# Patient Record
Sex: MALE | Race: AMERICAN INDIAN OR ALASKA NATIVE | Employment: FULL TIME
[De-identification: names, ages, dates, MRNs, and addresses within clinical notes are randomized per-mention and may not be internally consistent; named-entity substitution may affect disease eponyms.]

---

## 2017-05-18 ENCOUNTER — TELEPHONE (OUTPATIENT)
Dept: CASE MANAGEMENT | Age: 52
End: 2017-05-18

## 2017-05-22 ENCOUNTER — TELEPHONE (OUTPATIENT)
Dept: CASE MANAGEMENT | Age: 52
End: 2017-05-22

## 2017-08-22 ENCOUNTER — TELEPHONE (OUTPATIENT)
Dept: CASE MANAGEMENT | Age: 52
End: 2017-08-22

## 2017-11-17 ENCOUNTER — TELEPHONE (OUTPATIENT)
Dept: CASE MANAGEMENT | Age: 52
End: 2017-11-17

## 2018-04-24 NOTE — TELEPHONE ENCOUNTER
Incidental Pulmonary Nodule - Follow up Note:     CT Abdomen pelvis 11/17/16:  Bilateral pulmonary nodules, largest of which measures   approximately 0.56 cm, CT surveillance within 6 months is recommended. Further followup guidelines provided below.           Notification letter faxed to Cayetano Ryan MD 12/22/2016 9:42 AM. Receipt verified.   Stacey Avelar     Final courtesy reminder faxed/routed to Cayetano Ryan MD. Patients with incidental pulmonary nodules measuring under 8 mm are not being followed by the incidental pulmonary nodule program at this time.          5/22/2017 -attempted to call Gaye Cavazos and obtain primary care physician information.  Dr. Lucia Ford called into the program stating he is not Bernabe's physician and asked that we update Epic.  PCP letter will be mailed to the patient requesting he call in and update us.     8/22/2017 - attempted to do follow up call. No answer and no voicemail.     11/17/2017 - multiple unsuccessful attempts at contacting patient. Final attempt letter mailed to home. Patient removed from incidental pulmonary nodule program surveillance.       Daniella Gee., R.T.(R)(T), Radiology Patient Navigator
negative...

## 2018-10-22 ENCOUNTER — APPOINTMENT (OUTPATIENT)
Dept: ULTRASOUND IMAGING | Age: 53
End: 2018-10-22

## 2018-10-22 ENCOUNTER — APPOINTMENT (OUTPATIENT)
Dept: CT IMAGING | Age: 53
End: 2018-10-22

## 2018-10-22 ENCOUNTER — HOSPITAL ENCOUNTER (EMERGENCY)
Age: 53
Discharge: HOME | End: 2018-10-22
Attending: EMERGENCY MEDICINE

## 2018-10-22 VITALS
SYSTOLIC BLOOD PRESSURE: 149 MMHG | HEART RATE: 87 BPM | RESPIRATION RATE: 18 BRPM | OXYGEN SATURATION: 98 % | DIASTOLIC BLOOD PRESSURE: 101 MMHG | TEMPERATURE: 96.7 F

## 2018-10-22 DIAGNOSIS — R11.10 VOMITING AND DIARRHEA: ICD-10-CM

## 2018-10-22 DIAGNOSIS — K80.20 CALCULUS OF GALLBLADDER WITHOUT CHOLECYSTITIS WITHOUT OBSTRUCTION: ICD-10-CM

## 2018-10-22 DIAGNOSIS — R10.9 ABDOMINAL PAIN, UNSPECIFIED ABDOMINAL LOCATION: Primary | ICD-10-CM

## 2018-10-22 DIAGNOSIS — R19.7 VOMITING AND DIARRHEA: ICD-10-CM

## 2018-10-22 LAB
ALBUMIN SERPL-MCNC: 4.5 G/DL (ref 3.5–5.2)
ALP BLD-CCNC: 92 U/L (ref 40–129)
ALT SERPL-CCNC: 18 U/L (ref 0–40)
ANION GAP SERPL CALCULATED.3IONS-SCNC: 14 MMOL/L (ref 7–16)
AST SERPL-CCNC: 19 U/L (ref 0–39)
BACTERIA: ABNORMAL /HPF
BASOPHILS ABSOLUTE: 0.02 E9/L (ref 0–0.2)
BASOPHILS RELATIVE PERCENT: 0.4 % (ref 0–2)
BILIRUB SERPL-MCNC: 0.3 MG/DL (ref 0–1.2)
BILIRUBIN URINE: NEGATIVE
BLOOD, URINE: NEGATIVE
BUN BLDV-MCNC: 20 MG/DL (ref 6–20)
CALCIUM SERPL-MCNC: 9.1 MG/DL (ref 8.6–10.2)
CHLORIDE BLD-SCNC: 103 MMOL/L (ref 98–107)
CLARITY: CLEAR
CO2: 25 MMOL/L (ref 22–29)
COLOR: YELLOW
CREAT SERPL-MCNC: 1 MG/DL (ref 0.7–1.2)
EKG ATRIAL RATE: 75 BPM
EKG P AXIS: 39 DEGREES
EKG P-R INTERVAL: 140 MS
EKG Q-T INTERVAL: 386 MS
EKG QRS DURATION: 98 MS
EKG QTC CALCULATION (BAZETT): 431 MS
EKG R AXIS: 2 DEGREES
EKG T AXIS: 8 DEGREES
EKG VENTRICULAR RATE: 75 BPM
EOSINOPHILS ABSOLUTE: 0.14 E9/L (ref 0.05–0.5)
EOSINOPHILS RELATIVE PERCENT: 3 % (ref 0–6)
GFR AFRICAN AMERICAN: >60
GFR NON-AFRICAN AMERICAN: >60 ML/MIN/1.73
GLUCOSE BLD-MCNC: 104 MG/DL (ref 74–109)
GLUCOSE URINE: NEGATIVE MG/DL
HCT VFR BLD CALC: 46.1 % (ref 37–54)
HEMOGLOBIN: 14.9 G/DL (ref 12.5–16.5)
IMMATURE GRANULOCYTES #: 0.01 E9/L
IMMATURE GRANULOCYTES %: 0.2 % (ref 0–5)
KETONES, URINE: NEGATIVE MG/DL
LACTIC ACID: 1.6 MMOL/L (ref 0.5–2.2)
LEUKOCYTE ESTERASE, URINE: NEGATIVE
LIPASE: 29 U/L (ref 13–60)
LYMPHOCYTES ABSOLUTE: 1.38 E9/L (ref 1.5–4)
LYMPHOCYTES RELATIVE PERCENT: 29.6 % (ref 20–42)
MCH RBC QN AUTO: 28.8 PG (ref 26–35)
MCHC RBC AUTO-ENTMCNC: 32.3 % (ref 32–34.5)
MCV RBC AUTO: 89 FL (ref 80–99.9)
MONOCYTES ABSOLUTE: 0.59 E9/L (ref 0.1–0.95)
MONOCYTES RELATIVE PERCENT: 12.6 % (ref 2–12)
MUCUS: PRESENT
NEUTROPHILS ABSOLUTE: 2.53 E9/L (ref 1.8–7.3)
NEUTROPHILS RELATIVE PERCENT: 54.2 % (ref 43–80)
NITRITE, URINE: NEGATIVE
PDW BLD-RTO: 13 FL (ref 11.5–15)
PH UA: 6 (ref 5–9)
PLATELET # BLD: 301 E9/L (ref 130–450)
PMV BLD AUTO: 10.5 FL (ref 7–12)
POTASSIUM SERPL-SCNC: 3.5 MMOL/L (ref 3.5–5)
PROTEIN UA: NORMAL MG/DL
RBC # BLD: 5.18 E12/L (ref 3.8–5.8)
RBC UA: ABNORMAL /HPF (ref 0–2)
SODIUM BLD-SCNC: 142 MMOL/L (ref 132–146)
SPECIFIC GRAVITY UA: >=1.03 (ref 1–1.03)
TOTAL PROTEIN: 7.9 G/DL (ref 6.4–8.3)
TROPONIN: <0.01 NG/ML (ref 0–0.03)
UROBILINOGEN, URINE: 0.2 E.U./DL
WBC # BLD: 4.7 E9/L (ref 4.5–11.5)
WBC UA: ABNORMAL /HPF (ref 0–5)

## 2018-10-22 PROCEDURE — 81001 URINALYSIS AUTO W/SCOPE: CPT

## 2018-10-22 PROCEDURE — 6360000004 HC RX CONTRAST MEDICATION: Performed by: RADIOLOGY

## 2018-10-22 PROCEDURE — 6360000002 HC RX W HCPCS: Performed by: NURSE PRACTITIONER

## 2018-10-22 PROCEDURE — 80053 COMPREHEN METABOLIC PANEL: CPT

## 2018-10-22 PROCEDURE — 93005 ELECTROCARDIOGRAM TRACING: CPT | Performed by: NURSE PRACTITIONER

## 2018-10-22 PROCEDURE — 2580000003 HC RX 258: Performed by: EMERGENCY MEDICINE

## 2018-10-22 PROCEDURE — C9113 INJ PANTOPRAZOLE SODIUM, VIA: HCPCS | Performed by: EMERGENCY MEDICINE

## 2018-10-22 PROCEDURE — 74177 CT ABD & PELVIS W/CONTRAST: CPT

## 2018-10-22 PROCEDURE — 83605 ASSAY OF LACTIC ACID: CPT

## 2018-10-22 PROCEDURE — 76705 ECHO EXAM OF ABDOMEN: CPT

## 2018-10-22 PROCEDURE — 6360000002 HC RX W HCPCS: Performed by: EMERGENCY MEDICINE

## 2018-10-22 PROCEDURE — 96375 TX/PRO/DX INJ NEW DRUG ADDON: CPT

## 2018-10-22 PROCEDURE — 36415 COLL VENOUS BLD VENIPUNCTURE: CPT

## 2018-10-22 PROCEDURE — 99284 EMERGENCY DEPT VISIT MOD MDM: CPT

## 2018-10-22 PROCEDURE — 83690 ASSAY OF LIPASE: CPT

## 2018-10-22 PROCEDURE — 85025 COMPLETE CBC W/AUTO DIFF WBC: CPT

## 2018-10-22 PROCEDURE — 96374 THER/PROPH/DIAG INJ IV PUSH: CPT

## 2018-10-22 PROCEDURE — 84484 ASSAY OF TROPONIN QUANT: CPT

## 2018-10-22 PROCEDURE — 96372 THER/PROPH/DIAG INJ SC/IM: CPT

## 2018-10-22 RX ORDER — ONDANSETRON 4 MG/1
4 TABLET, ORALLY DISINTEGRATING ORAL ONCE
Status: COMPLETED | OUTPATIENT
Start: 2018-10-22 | End: 2018-10-22

## 2018-10-22 RX ORDER — DICYCLOMINE HYDROCHLORIDE 10 MG/1
10 CAPSULE ORAL 4 TIMES DAILY
Qty: 28 CAPSULE | Refills: 0 | Status: SHIPPED | OUTPATIENT
Start: 2018-10-22 | End: 2019-01-15

## 2018-10-22 RX ORDER — PANTOPRAZOLE SODIUM 40 MG/1
40 TABLET, DELAYED RELEASE ORAL DAILY
Qty: 30 TABLET | Refills: 3 | Status: SHIPPED | OUTPATIENT
Start: 2018-10-22 | End: 2019-02-11 | Stop reason: ALTCHOICE

## 2018-10-22 RX ORDER — PANTOPRAZOLE SODIUM 40 MG/10ML
40 INJECTION, POWDER, LYOPHILIZED, FOR SOLUTION INTRAVENOUS ONCE
Status: COMPLETED | OUTPATIENT
Start: 2018-10-22 | End: 2018-10-22

## 2018-10-22 RX ORDER — 0.9 % SODIUM CHLORIDE 0.9 %
1000 INTRAVENOUS SOLUTION INTRAVENOUS ONCE
Status: COMPLETED | OUTPATIENT
Start: 2018-10-22 | End: 2018-10-22

## 2018-10-22 RX ORDER — ONDANSETRON 4 MG/1
4 TABLET, ORALLY DISINTEGRATING ORAL EVERY 8 HOURS PRN
Qty: 9 TABLET | Refills: 0 | Status: SHIPPED | OUTPATIENT
Start: 2018-10-22 | End: 2018-10-25

## 2018-10-22 RX ORDER — DICYCLOMINE HYDROCHLORIDE 10 MG/ML
20 INJECTION INTRAMUSCULAR ONCE
Status: COMPLETED | OUTPATIENT
Start: 2018-10-22 | End: 2018-10-22

## 2018-10-22 RX ORDER — MORPHINE SULFATE 2 MG/ML
2 INJECTION, SOLUTION INTRAMUSCULAR; INTRAVENOUS ONCE
Status: COMPLETED | OUTPATIENT
Start: 2018-10-22 | End: 2018-10-22

## 2018-10-22 RX ORDER — ONDANSETRON 2 MG/ML
4 INJECTION INTRAMUSCULAR; INTRAVENOUS ONCE
Status: COMPLETED | OUTPATIENT
Start: 2018-10-22 | End: 2018-10-22

## 2018-10-22 RX ORDER — MIDAZOLAM HYDROCHLORIDE 1 MG/ML
1 INJECTION INTRAMUSCULAR; INTRAVENOUS ONCE
Status: COMPLETED | OUTPATIENT
Start: 2018-10-22 | End: 2018-10-22

## 2018-10-22 RX ADMIN — SODIUM CHLORIDE 1000 ML: 9 INJECTION, SOLUTION INTRAVENOUS at 21:19

## 2018-10-22 RX ADMIN — ONDANSETRON 4 MG: 4 TABLET, ORALLY DISINTEGRATING ORAL at 18:11

## 2018-10-22 RX ADMIN — DICYCLOMINE HYDROCHLORIDE 20 MG: 20 INJECTION, SOLUTION INTRAMUSCULAR at 21:12

## 2018-10-22 RX ADMIN — IOPAMIDOL 110 ML: 755 INJECTION, SOLUTION INTRAVENOUS at 20:22

## 2018-10-22 RX ADMIN — ONDANSETRON 4 MG: 2 INJECTION INTRAMUSCULAR; INTRAVENOUS at 23:12

## 2018-10-22 RX ADMIN — MORPHINE SULFATE 2 MG: 2 INJECTION, SOLUTION INTRAMUSCULAR; INTRAVENOUS at 23:12

## 2018-10-22 RX ADMIN — MIDAZOLAM HYDROCHLORIDE 1 MG: 2 INJECTION, SOLUTION INTRAMUSCULAR; INTRAVENOUS at 21:12

## 2018-10-22 RX ADMIN — PANTOPRAZOLE SODIUM 40 MG: 40 INJECTION, POWDER, FOR SOLUTION INTRAVENOUS at 21:12

## 2018-10-22 ASSESSMENT — PAIN DESCRIPTION - ONSET: ONSET: ON-GOING

## 2018-10-22 ASSESSMENT — PAIN DESCRIPTION - ORIENTATION: ORIENTATION: RIGHT;UPPER

## 2018-10-22 ASSESSMENT — PAIN SCALES - GENERAL
PAINLEVEL_OUTOF10: 5
PAINLEVEL_OUTOF10: 5

## 2018-10-22 ASSESSMENT — PAIN DESCRIPTION - DESCRIPTORS: DESCRIPTORS: SHARP

## 2018-10-22 ASSESSMENT — PAIN DESCRIPTION - FREQUENCY: FREQUENCY: INTERMITTENT

## 2018-10-22 ASSESSMENT — PAIN DESCRIPTION - LOCATION: LOCATION: ABDOMEN

## 2018-10-23 ENCOUNTER — TELEPHONE (OUTPATIENT)
Dept: SURGERY | Age: 53
End: 2018-10-23

## 2018-10-23 NOTE — TELEPHONE ENCOUNTER
THEODORE Bowser received a call from patient emergency contact Pepper Dorman wanting to schedule an appointment. [facility] verified & appointment letter sent.         Electronically signed by Jaime Lucia MA on 10/23/18 at 3:14 PM

## 2018-10-23 NOTE — ED PROVIDER NOTES
encounter of 10/22/18   CBC Auto Differential   Result Value Ref Range    WBC 4.7 4.5 - 11.5 E9/L    RBC 5.18 3.80 - 5.80 E12/L    Hemoglobin 14.9 12.5 - 16.5 g/dL    Hematocrit 46.1 37.0 - 54.0 %    MCV 89.0 80.0 - 99.9 fL    MCH 28.8 26.0 - 35.0 pg    MCHC 32.3 32.0 - 34.5 %    RDW 13.0 11.5 - 15.0 fL    Platelets 921 946 - 530 E9/L    MPV 10.5 7.0 - 12.0 fL    Neutrophils % 54.2 43.0 - 80.0 %    Immature Granulocytes % 0.2 0.0 - 5.0 %    Lymphocytes % 29.6 20.0 - 42.0 %    Monocytes % 12.6 (H) 2.0 - 12.0 %    Eosinophils % 3.0 0.0 - 6.0 %    Basophils % 0.4 0.0 - 2.0 %    Neutrophils # 2.53 1.80 - 7.30 E9/L    Immature Granulocytes # 0.01 E9/L    Lymphocytes # 1.38 (L) 1.50 - 4.00 E9/L    Monocytes # 0.59 0.10 - 0.95 E9/L    Eosinophils # 0.14 0.05 - 0.50 E9/L    Basophils # 0.02 0.00 - 0.20 E9/L   Comprehensive Metabolic Panel   Result Value Ref Range    Sodium 142 132 - 146 mmol/L    Potassium 3.5 3.5 - 5.0 mmol/L    Chloride 103 98 - 107 mmol/L    CO2 25 22 - 29 mmol/L    Anion Gap 14 7 - 16 mmol/L    Glucose 104 74 - 109 mg/dL    BUN 20 6 - 20 mg/dL    CREATININE 1.0 0.7 - 1.2 mg/dL    GFR Non-African American >60 >=60 mL/min/1.73    GFR African American >60     Calcium 9.1 8.6 - 10.2 mg/dL    Total Protein 7.9 6.4 - 8.3 g/dL    Alb 4.5 3.5 - 5.2 g/dL    Total Bilirubin 0.3 0.0 - 1.2 mg/dL    Alkaline Phosphatase 92 40 - 129 U/L    ALT 18 0 - 40 U/L    AST 19 0 - 39 U/L   Lipase   Result Value Ref Range    Lipase 29 13 - 60 U/L   Lactic Acid, Plasma   Result Value Ref Range    Lactic Acid 1.6 0.5 - 2.2 mmol/L   Urinalysis   Result Value Ref Range    Color, UA Yellow Straw/Yellow    Clarity, UA Clear Clear    Glucose, Ur Negative Negative mg/dL    Bilirubin Urine Negative Negative    Ketones, Urine Negative Negative mg/dL    Specific Gravity, UA >=1.030 1.005 - 1.030    Blood, Urine Negative Negative    pH, UA 6.0 5.0 - 9.0    Protein, UA TRACE Negative mg/dL    Urobilinogen, Urine 0.2 <2.0 E.U./dL    Nitrite,

## 2018-10-24 ENCOUNTER — HOSPITAL ENCOUNTER (OUTPATIENT)
Dept: CT IMAGING | Age: 53
Discharge: HOME | End: 2018-10-26

## 2018-10-24 ENCOUNTER — OFFICE VISIT (OUTPATIENT)
Dept: INTERNAL MEDICINE | Age: 53
End: 2018-10-24

## 2018-10-24 ENCOUNTER — HOSPITAL ENCOUNTER (OUTPATIENT)
Age: 53
Discharge: HOME | End: 2018-10-24

## 2018-10-24 VITALS
TEMPERATURE: 98.1 F | DIASTOLIC BLOOD PRESSURE: 73 MMHG | SYSTOLIC BLOOD PRESSURE: 133 MMHG | WEIGHT: 198 LBS | RESPIRATION RATE: 14 BRPM | HEIGHT: 74 IN | BODY MASS INDEX: 25.41 KG/M2 | HEART RATE: 63 BPM

## 2018-10-24 DIAGNOSIS — Z00.00 HEALTHCARE MAINTENANCE: ICD-10-CM

## 2018-10-24 DIAGNOSIS — R91.8 PULMONARY NODULES: ICD-10-CM

## 2018-10-24 DIAGNOSIS — Z23 ENCOUNTER FOR VACCINATION: ICD-10-CM

## 2018-10-24 DIAGNOSIS — K52.9 ENTEROCOLITIS: ICD-10-CM

## 2018-10-24 DIAGNOSIS — R91.8 PULMONARY NODULES: Primary | ICD-10-CM

## 2018-10-24 DIAGNOSIS — K80.20 CALCULUS OF GALLBLADDER WITHOUT CHOLECYSTITIS WITHOUT OBSTRUCTION: ICD-10-CM

## 2018-10-24 LAB
CHOLESTEROL, TOTAL: 173 MG/DL (ref 0–199)
HDLC SERPL-MCNC: 30 MG/DL
LDL CHOLESTEROL CALCULATED: 120 MG/DL (ref 0–99)
TRIGL SERPL-MCNC: 117 MG/DL (ref 0–149)
VLDLC SERPL CALC-MCNC: 23 MG/DL

## 2018-10-24 PROCEDURE — 99203 OFFICE O/P NEW LOW 30 MIN: CPT | Performed by: INTERNAL MEDICINE

## 2018-10-24 PROCEDURE — 90686 IIV4 VACC NO PRSV 0.5 ML IM: CPT

## 2018-10-24 PROCEDURE — 6360000002 HC RX W HCPCS

## 2018-10-24 PROCEDURE — 86703 HIV-1/HIV-2 1 RESULT ANTBDY: CPT

## 2018-10-24 PROCEDURE — 86803 HEPATITIS C AB TEST: CPT

## 2018-10-24 PROCEDURE — 71250 CT THORAX DX C-: CPT

## 2018-10-24 PROCEDURE — 80061 LIPID PANEL: CPT

## 2018-10-24 PROCEDURE — 36415 COLL VENOUS BLD VENIPUNCTURE: CPT

## 2018-10-24 PROCEDURE — G0008 ADMIN INFLUENZA VIRUS VAC: HCPCS

## 2018-10-24 ASSESSMENT — ENCOUNTER SYMPTOMS
BACK PAIN: 0
SHORTNESS OF BREATH: 0
ABDOMINAL PAIN: 1
BLOOD IN STOOL: 0
BLURRED VISION: 0
NAUSEA: 1
VOMITING: 1
WHEEZING: 0
EYE DISCHARGE: 0
DIARRHEA: 1
SPUTUM PRODUCTION: 0
HEARTBURN: 0
CONSTIPATION: 0
COUGH: 0

## 2018-10-24 NOTE — PROGRESS NOTES
hematuria, trauma, neck or back pain or other complaints. \"     REVIEW OF SYSTEMS:    Review of Systems   Constitutional: Negative for chills and fever. HENT: Negative for hearing loss. Eyes: Negative for blurred vision and discharge. Respiratory: Negative for cough, sputum production, shortness of breath and wheezing. Cardiovascular: Negative for chest pain, palpitations and leg swelling. Gastrointestinal: Positive for abdominal pain, diarrhea, nausea and vomiting. Negative for blood in stool, constipation and heartburn. Genitourinary: Negative for dysuria and urgency. Musculoskeletal: Negative for back pain, falls and myalgias. Skin: Negative. Neurological: Negative for dizziness, tremors and seizures. Endo/Heme/Allergies: Does not bruise/bleed easily. Psychiatric/Behavioral: Negative. Current Condition: stable  Current medications:  Current Outpatient Rx   Medication Sig Dispense Refill    dicyclomine (BENTYL) 10 MG capsule Take 1 capsule by mouth 4 times daily for 7 days 28 capsule 0    POTASSIUM CHLORIDE by Does not apply route.  pantoprazole (PROTONIX) 40 MG tablet Take 1 tablet by mouth daily 30 tablet 3    ondansetron (ZOFRAN ODT) 4 MG disintegrating tablet Take 1 tablet by mouth every 8 hours as needed for Nausea or Vomiting 9 tablet 0    omeprazole (PRILOSEC) 20 MG delayed release capsule Take 1 capsule by mouth 2 times daily for 14 days 28 capsule 0     compliant    Vitals, medications, current problem list, past medical and surgical history reviewed and updated after discussing with the patient.     As per HPI  Other problems being managed in the clinic include:  Patient Active Problem List   Diagnosis    Early Small bowel obstruction due to adhesions    Left flank pain       Physical Exam: (Pertinent)  /73   Pulse 63   Temp 98.1 °F (36.7 °C) (Oral)   Resp 14   Ht 6' 2\" (1.88 m)   Wt 198 lb (89.8 kg)   BMI 25.42 kg/m²     General Appearance:  awake,

## 2018-10-24 NOTE — PATIENT INSTRUCTIONS
(such as switches that go on or off when you clap your hands) to make it easier to turn lights on if you have to get up during the night. · Install sturdy handrails on stairways. · Move items in your cabinets so that the things you use a lot are on the lower shelves (about waist level). · Keep a cordless phone and a flashlight with new batteries by your bed. If possible, put a phone in each of the main rooms of your house, or carry a cell phone in case you fall and cannot reach a phone. Or, you can wear a device around your neck or wrist. You push a button that sends a signal for help. · Wear low-heeled shoes that fit well and give your feet good support. Use footwear with nonskid soles. Check the heels and soles of your shoes for wear. Repair or replace worn heels or soles. · Do not wear socks without shoes on wood floors. · Walk on the grass when the sidewalks are slippery. If you live in an area that gets snow and ice in the winter, sprinkle salt on slippery steps and sidewalks. Preventing falls in the bath  · Install grab bars and nonskid mats inside and outside your shower or tub and near the toilet and sinks. · Use shower chairs and bath benches. · Use a hand-held shower head that will allow you to sit while showering. · Get into a tub or shower by putting the weaker leg in first. Get out of a tub or shower with your strong side first.  · Repair loose toilet seats and consider installing a raised toilet seat to make getting on and off the toilet easier. · Keep your bathroom door unlocked while you are in the shower. Where can you learn more? Go to https://[employer]kate[employer] Mediaeb.GeoTrac. org and sign in to your [employer] account. Enter [phone] [id number] [phone] in the [employer] box to learn more about \"Preventing Falls: Care Instructions. \"     If you do not have an account, please click on the \"Sign Up Now\" link. Current as of: May 12, 2017  Content Version: 11.7  © 3386-0406 [employer], [facility].  Care

## 2018-10-25 LAB
HEPATITIS C ANTIBODY INTERPRETATION: NORMAL
HIV-1 AND HIV-2 ANTIBODIES: NORMAL

## 2018-11-14 ENCOUNTER — OFFICE VISIT (OUTPATIENT)
Dept: SURGERY | Age: 53
End: 2018-11-14

## 2018-11-14 VITALS
TEMPERATURE: 98.2 F | HEIGHT: 74 IN | DIASTOLIC BLOOD PRESSURE: 76 MMHG | BODY MASS INDEX: 25.73 KG/M2 | OXYGEN SATURATION: 96 % | HEART RATE: 60 BPM | RESPIRATION RATE: 16 BRPM | SYSTOLIC BLOOD PRESSURE: 122 MMHG | WEIGHT: 200.5 LBS

## 2018-11-14 DIAGNOSIS — R10.9 LEFT FLANK PAIN: ICD-10-CM

## 2018-11-14 PROCEDURE — 99202 OFFICE O/P NEW SF 15 MIN: CPT | Performed by: SURGERY

## 2018-11-14 NOTE — PROGRESS NOTES
Subjective:      Patient ID: Thu Bryson is a 52 y.o. male. HPI  RUQ pain radiating to right flank and shoulder. Known cholelithiasis. Review of Systems    Objective:   Physical Exam  No significant RUQ tenderness. Assessment:      Cholelithiasis, possible cholecystitis      Plan:      Possible lap jannie after Holidays. Re-evaluate symptoms.         Nestor Elena MD

## 2018-11-14 NOTE — PATIENT INSTRUCTIONS
MA will call after the Holidays to arrange gallbladder surgery. Any questions or concerns, please contact my medical assistant Vimal Fontaine at [phone].

## 2019-01-07 ENCOUNTER — TELEPHONE (OUTPATIENT)
Dept: SURGERY | Age: 54
End: 2019-01-07

## 2019-01-08 ENCOUNTER — TELEPHONE (OUTPATIENT)
Dept: SURGERY | Age: 54
End: 2019-01-08

## 2019-01-23 ENCOUNTER — OFFICE VISIT (OUTPATIENT)
Dept: SURGERY | Age: 54
End: 2019-01-23

## 2019-01-23 VITALS
TEMPERATURE: 97.5 F | SYSTOLIC BLOOD PRESSURE: 111 MMHG | HEIGHT: 74 IN | DIASTOLIC BLOOD PRESSURE: 75 MMHG | RESPIRATION RATE: 16 BRPM | BODY MASS INDEX: 26.18 KG/M2 | OXYGEN SATURATION: 94 % | HEART RATE: 59 BPM | WEIGHT: 204 LBS

## 2019-01-23 DIAGNOSIS — K81.1 CHRONIC CHOLECYSTITIS: Primary | ICD-10-CM

## 2019-01-23 PROCEDURE — 99212 OFFICE O/P EST SF 10 MIN: CPT | Performed by: SURGERY

## 2019-01-25 ENCOUNTER — ANESTHESIA EVENT (OUTPATIENT)
Dept: OPERATING ROOM | Age: 54
End: 2019-01-25

## 2019-01-25 ENCOUNTER — HOSPITAL ENCOUNTER (OUTPATIENT)
Age: 54
Setting detail: OBSERVATION
Discharge: HOME | End: 2019-01-26
Attending: SURGERY | Admitting: SURGERY

## 2019-01-25 ENCOUNTER — ANESTHESIA (OUTPATIENT)
Dept: OPERATING ROOM | Age: 54
End: 2019-01-25

## 2019-01-25 VITALS
TEMPERATURE: 95.9 F | SYSTOLIC BLOOD PRESSURE: 128 MMHG | RESPIRATION RATE: 27 BRPM | DIASTOLIC BLOOD PRESSURE: 85 MMHG | OXYGEN SATURATION: 99 %

## 2019-01-25 DIAGNOSIS — K81.1 CHRONIC CHOLECYSTITIS: Primary | ICD-10-CM

## 2019-01-25 PROBLEM — G89.18 POST-OPERATIVE PAIN: Status: ACTIVE | Noted: 2019-01-25

## 2019-01-25 PROCEDURE — 7100000011 HC PHASE II RECOVERY - ADDTL 15 MIN: Performed by: SURGERY

## 2019-01-25 PROCEDURE — 2720000010 HC SURG SUPPLY STERILE: Performed by: SURGERY

## 2019-01-25 PROCEDURE — 2500000003 HC RX 250 WO HCPCS

## 2019-01-25 PROCEDURE — 6360000002 HC RX W HCPCS

## 2019-01-25 PROCEDURE — 88304 TISSUE EXAM BY PATHOLOGIST: CPT

## 2019-01-25 PROCEDURE — G0378 HOSPITAL OBSERVATION PER HR: HCPCS

## 2019-01-25 PROCEDURE — 7100000001 HC PACU RECOVERY - ADDTL 15 MIN: Performed by: SURGERY

## 2019-01-25 PROCEDURE — 6370000000 HC RX 637 (ALT 250 FOR IP): Performed by: ANESTHESIOLOGY

## 2019-01-25 PROCEDURE — 6360000002 HC RX W HCPCS: Performed by: SURGERY

## 2019-01-25 PROCEDURE — 6360000002 HC RX W HCPCS: Performed by: ANESTHESIOLOGY

## 2019-01-25 PROCEDURE — 3600000014 HC SURGERY LEVEL 4 ADDTL 15MIN: Performed by: SURGERY

## 2019-01-25 PROCEDURE — 47562 LAPAROSCOPIC CHOLECYSTECTOMY: CPT | Performed by: SURGERY

## 2019-01-25 PROCEDURE — 7100000000 HC PACU RECOVERY - FIRST 15 MIN: Performed by: SURGERY

## 2019-01-25 PROCEDURE — 2709999900 HC NON-CHARGEABLE SUPPLY: Performed by: SURGERY

## 2019-01-25 PROCEDURE — 3700000001 HC ADD 15 MINUTES (ANESTHESIA): Performed by: SURGERY

## 2019-01-25 PROCEDURE — 7100000010 HC PHASE II RECOVERY - FIRST 15 MIN: Performed by: SURGERY

## 2019-01-25 PROCEDURE — 2580000003 HC RX 258: Performed by: SURGERY

## 2019-01-25 PROCEDURE — 2500000003 HC RX 250 WO HCPCS: Performed by: SURGERY

## 2019-01-25 PROCEDURE — 3600000004 HC SURGERY LEVEL 4 BASE: Performed by: SURGERY

## 2019-01-25 PROCEDURE — 3700000000 HC ANESTHESIA ATTENDED CARE: Performed by: SURGERY

## 2019-01-25 PROCEDURE — 6370000000 HC RX 637 (ALT 250 FOR IP): Performed by: SURGERY

## 2019-01-25 RX ORDER — MIDAZOLAM HYDROCHLORIDE 1 MG/ML
INJECTION INTRAMUSCULAR; INTRAVENOUS PRN
Status: DISCONTINUED | OUTPATIENT
Start: 2019-01-25 | End: 2019-01-25 | Stop reason: SDUPTHER

## 2019-01-25 RX ORDER — SODIUM CHLORIDE 0.9 % (FLUSH) 0.9 %
10 SYRINGE (ML) INJECTION EVERY 12 HOURS SCHEDULED
Status: DISCONTINUED | OUTPATIENT
Start: 2019-01-25 | End: 2019-01-25 | Stop reason: HOSPADM

## 2019-01-25 RX ORDER — EPHEDRINE SULFATE 50 MG/ML
INJECTION INTRAVENOUS PRN
Status: DISCONTINUED | OUTPATIENT
Start: 2019-01-25 | End: 2019-01-25 | Stop reason: SDUPTHER

## 2019-01-25 RX ORDER — PROPOFOL 10 MG/ML
INJECTION, EMULSION INTRAVENOUS PRN
Status: DISCONTINUED | OUTPATIENT
Start: 2019-01-25 | End: 2019-01-25 | Stop reason: SDUPTHER

## 2019-01-25 RX ORDER — ROCURONIUM BROMIDE 10 MG/ML
INJECTION, SOLUTION INTRAVENOUS PRN
Status: DISCONTINUED | OUTPATIENT
Start: 2019-01-25 | End: 2019-01-25 | Stop reason: SDUPTHER

## 2019-01-25 RX ORDER — OXYCODONE HYDROCHLORIDE 10 MG/1
10 TABLET ORAL EVERY 4 HOURS PRN
Status: DISCONTINUED | OUTPATIENT
Start: 2019-01-25 | End: 2019-01-26 | Stop reason: HOSPADM

## 2019-01-25 RX ORDER — GLYCOPYRROLATE 1 MG/5 ML
SYRINGE (ML) INTRAVENOUS PRN
Status: DISCONTINUED | OUTPATIENT
Start: 2019-01-25 | End: 2019-01-25 | Stop reason: SDUPTHER

## 2019-01-25 RX ORDER — FENTANYL CITRATE 50 UG/ML
25 INJECTION, SOLUTION INTRAMUSCULAR; INTRAVENOUS EVERY 5 MIN PRN
Status: DISCONTINUED | OUTPATIENT
Start: 2019-01-25 | End: 2019-01-25 | Stop reason: HOSPADM

## 2019-01-25 RX ORDER — ONDANSETRON 2 MG/ML
INJECTION INTRAMUSCULAR; INTRAVENOUS PRN
Status: DISCONTINUED | OUTPATIENT
Start: 2019-01-25 | End: 2019-01-25 | Stop reason: SDUPTHER

## 2019-01-25 RX ORDER — OXYCODONE HYDROCHLORIDE AND ACETAMINOPHEN 5; 325 MG/1; MG/1
1 TABLET ORAL EVERY 6 HOURS PRN
Qty: 28 TABLET | Refills: 0 | Status: SHIPPED | OUTPATIENT
Start: 2019-01-25 | End: 2019-02-01

## 2019-01-25 RX ORDER — MORPHINE SULFATE 2 MG/ML
2 INJECTION, SOLUTION INTRAMUSCULAR; INTRAVENOUS
Status: DISCONTINUED | OUTPATIENT
Start: 2019-01-25 | End: 2019-01-26 | Stop reason: HOSPADM

## 2019-01-25 RX ORDER — HYDROCODONE BITARTRATE AND ACETAMINOPHEN 5; 325 MG/1; MG/1
2 TABLET ORAL PRN
Status: COMPLETED | OUTPATIENT
Start: 2019-01-25 | End: 2019-01-25

## 2019-01-25 RX ORDER — NEOSTIGMINE METHYLSULFATE 1 MG/ML
INJECTION, SOLUTION INTRAVENOUS PRN
Status: DISCONTINUED | OUTPATIENT
Start: 2019-01-25 | End: 2019-01-25 | Stop reason: SDUPTHER

## 2019-01-25 RX ORDER — ONDANSETRON 2 MG/ML
4 INJECTION INTRAMUSCULAR; INTRAVENOUS EVERY 6 HOURS PRN
Status: DISCONTINUED | OUTPATIENT
Start: 2019-01-25 | End: 2019-01-26 | Stop reason: HOSPADM

## 2019-01-25 RX ORDER — SODIUM CHLORIDE 0.9 % (FLUSH) 0.9 %
10 SYRINGE (ML) INJECTION PRN
Status: DISCONTINUED | OUTPATIENT
Start: 2019-01-25 | End: 2019-01-25 | Stop reason: HOSPADM

## 2019-01-25 RX ORDER — KETOROLAC TROMETHAMINE 30 MG/ML
INJECTION, SOLUTION INTRAMUSCULAR; INTRAVENOUS PRN
Status: DISCONTINUED | OUTPATIENT
Start: 2019-01-25 | End: 2019-01-25 | Stop reason: SDUPTHER

## 2019-01-25 RX ORDER — SODIUM CHLORIDE, SODIUM LACTATE, POTASSIUM CHLORIDE, CALCIUM CHLORIDE 600; 310; 30; 20 MG/100ML; MG/100ML; MG/100ML; MG/100ML
INJECTION, SOLUTION INTRAVENOUS CONTINUOUS
Status: DISCONTINUED | OUTPATIENT
Start: 2019-01-25 | End: 2019-01-26

## 2019-01-25 RX ORDER — SODIUM CHLORIDE 0.9 % (FLUSH) 0.9 %
10 SYRINGE (ML) INJECTION PRN
Status: DISCONTINUED | OUTPATIENT
Start: 2019-01-25 | End: 2019-01-26 | Stop reason: HOSPADM

## 2019-01-25 RX ORDER — LIDOCAINE HYDROCHLORIDE 20 MG/ML
INJECTION, SOLUTION INFILTRATION; PERINEURAL PRN
Status: DISCONTINUED | OUTPATIENT
Start: 2019-01-25 | End: 2019-01-25 | Stop reason: SDUPTHER

## 2019-01-25 RX ORDER — HYDROCODONE BITARTRATE AND ACETAMINOPHEN 5; 325 MG/1; MG/1
1 TABLET ORAL PRN
Status: COMPLETED | OUTPATIENT
Start: 2019-01-25 | End: 2019-01-25

## 2019-01-25 RX ORDER — CLINDAMYCIN PHOSPHATE 900 MG/50ML
900 INJECTION INTRAVENOUS
Status: COMPLETED | OUTPATIENT
Start: 2019-01-25 | End: 2019-01-25

## 2019-01-25 RX ORDER — PANTOPRAZOLE SODIUM 40 MG/1
40 TABLET, DELAYED RELEASE ORAL DAILY
Status: DISCONTINUED | OUTPATIENT
Start: 2019-01-25 | End: 2019-01-26 | Stop reason: HOSPADM

## 2019-01-25 RX ORDER — FENTANYL CITRATE 50 UG/ML
INJECTION, SOLUTION INTRAMUSCULAR; INTRAVENOUS PRN
Status: DISCONTINUED | OUTPATIENT
Start: 2019-01-25 | End: 2019-01-25 | Stop reason: SDUPTHER

## 2019-01-25 RX ORDER — MORPHINE SULFATE 2 MG/ML
2 INJECTION, SOLUTION INTRAMUSCULAR; INTRAVENOUS EVERY 5 MIN PRN
Status: COMPLETED | OUTPATIENT
Start: 2019-01-25 | End: 2019-01-25

## 2019-01-25 RX ORDER — SODIUM CHLORIDE 0.9 % (FLUSH) 0.9 %
10 SYRINGE (ML) INJECTION EVERY 12 HOURS SCHEDULED
Status: DISCONTINUED | OUTPATIENT
Start: 2019-01-25 | End: 2019-01-26 | Stop reason: HOSPADM

## 2019-01-25 RX ORDER — ACETAMINOPHEN 325 MG/1
650 TABLET ORAL EVERY 4 HOURS PRN
Status: DISCONTINUED | OUTPATIENT
Start: 2019-01-25 | End: 2019-01-26 | Stop reason: HOSPADM

## 2019-01-25 RX ORDER — MORPHINE SULFATE 2 MG/ML
INJECTION, SOLUTION INTRAMUSCULAR; INTRAVENOUS
Status: COMPLETED
Start: 2019-01-25 | End: 2019-01-25

## 2019-01-25 RX ORDER — OXYCODONE HYDROCHLORIDE 5 MG/1
5 TABLET ORAL EVERY 4 HOURS PRN
Status: DISCONTINUED | OUTPATIENT
Start: 2019-01-25 | End: 2019-01-26 | Stop reason: HOSPADM

## 2019-01-25 RX ADMIN — SODIUM CHLORIDE, POTASSIUM CHLORIDE, SODIUM LACTATE AND CALCIUM CHLORIDE: 600; 310; 30; 20 INJECTION, SOLUTION INTRAVENOUS at 08:30

## 2019-01-25 RX ADMIN — CLINDAMYCIN PHOSPHATE 900 MG: 900 INJECTION, SOLUTION INTRAVENOUS at 07:36

## 2019-01-25 RX ADMIN — MIDAZOLAM HYDROCHLORIDE 2 MG: 1 INJECTION, SOLUTION INTRAMUSCULAR; INTRAVENOUS at 07:25

## 2019-01-25 RX ADMIN — ROCURONIUM BROMIDE 40 MG: 10 INJECTION INTRAVENOUS at 07:34

## 2019-01-25 RX ADMIN — PROPOFOL 200 MG: 10 INJECTION, EMULSION INTRAVENOUS at 07:34

## 2019-01-25 RX ADMIN — Medication 0.6 MG: at 09:00

## 2019-01-25 RX ADMIN — FENTANYL CITRATE 150 MCG: 50 INJECTION, SOLUTION INTRAMUSCULAR; INTRAVENOUS at 07:34

## 2019-01-25 RX ADMIN — SODIUM CHLORIDE, POTASSIUM CHLORIDE, SODIUM LACTATE AND CALCIUM CHLORIDE: 600; 310; 30; 20 INJECTION, SOLUTION INTRAVENOUS at 05:42

## 2019-01-25 RX ADMIN — Medication 3 MG: at 09:00

## 2019-01-25 RX ADMIN — MORPHINE SULFATE 2 MG: 2 INJECTION, SOLUTION INTRAMUSCULAR; INTRAVENOUS at 09:40

## 2019-01-25 RX ADMIN — MORPHINE SULFATE 2 MG: 2 INJECTION, SOLUTION INTRAMUSCULAR; INTRAVENOUS at 09:45

## 2019-01-25 RX ADMIN — HYDROMORPHONE HYDROCHLORIDE 0.5 MG: 1 INJECTION, SOLUTION INTRAMUSCULAR; INTRAVENOUS; SUBCUTANEOUS at 10:00

## 2019-01-25 RX ADMIN — ONDANSETRON HYDROCHLORIDE 4 MG: 2 INJECTION, SOLUTION INTRAMUSCULAR; INTRAVENOUS at 09:00

## 2019-01-25 RX ADMIN — ROCURONIUM BROMIDE 10 MG: 10 INJECTION INTRAVENOUS at 08:00

## 2019-01-25 RX ADMIN — FENTANYL CITRATE 25 MCG: 50 INJECTION, SOLUTION INTRAMUSCULAR; INTRAVENOUS at 09:02

## 2019-01-25 RX ADMIN — ROCURONIUM BROMIDE 5 MG: 10 INJECTION INTRAVENOUS at 08:33

## 2019-01-25 RX ADMIN — MORPHINE SULFATE 2 MG: 2 INJECTION, SOLUTION INTRAMUSCULAR; INTRAVENOUS at 09:50

## 2019-01-25 RX ADMIN — ENOXAPARIN SODIUM 40 MG: 40 INJECTION SUBCUTANEOUS at 12:56

## 2019-01-25 RX ADMIN — FENTANYL CITRATE 25 MCG: 50 INJECTION, SOLUTION INTRAMUSCULAR; INTRAVENOUS at 09:06

## 2019-01-25 RX ADMIN — FENTANYL CITRATE 50 MCG: 50 INJECTION, SOLUTION INTRAMUSCULAR; INTRAVENOUS at 09:15

## 2019-01-25 RX ADMIN — EPHEDRINE SULFATE 10 MG: 50 INJECTION, SOLUTION INTRAVENOUS at 07:50

## 2019-01-25 RX ADMIN — MORPHINE SULFATE 2 MG: 2 INJECTION, SOLUTION INTRAMUSCULAR; INTRAVENOUS at 15:11

## 2019-01-25 RX ADMIN — MORPHINE SULFATE 2 MG: 2 INJECTION, SOLUTION INTRAMUSCULAR; INTRAVENOUS at 09:55

## 2019-01-25 RX ADMIN — LIDOCAINE HYDROCHLORIDE 60 MG: 20 INJECTION, SOLUTION INFILTRATION; PERINEURAL at 07:34

## 2019-01-25 RX ADMIN — HYDROMORPHONE HYDROCHLORIDE 0.5 MG: 1 INJECTION, SOLUTION INTRAMUSCULAR; INTRAVENOUS; SUBCUTANEOUS at 10:05

## 2019-01-25 RX ADMIN — Medication 0.2 MG: at 07:46

## 2019-01-25 RX ADMIN — OXYCODONE HYDROCHLORIDE 10 MG: 10 TABLET ORAL at 22:05

## 2019-01-25 RX ADMIN — KETOROLAC TROMETHAMINE 30 MG: 30 INJECTION, SOLUTION INTRAMUSCULAR; INTRAVENOUS at 09:06

## 2019-01-25 RX ADMIN — HYDROCODONE BITARTRATE AND ACETAMINOPHEN 2 TABLET: 5; 325 TABLET ORAL at 10:54

## 2019-01-25 ASSESSMENT — PULMONARY FUNCTION TESTS
PIF_VALUE: 24
PIF_VALUE: 1
PIF_VALUE: 23
PIF_VALUE: 26
PIF_VALUE: 13
PIF_VALUE: 15
PIF_VALUE: 24
PIF_VALUE: 17
PIF_VALUE: 16
PIF_VALUE: 26
PIF_VALUE: 1
PIF_VALUE: 3
PIF_VALUE: 23
PIF_VALUE: 24
PIF_VALUE: 24
PIF_VALUE: 23
PIF_VALUE: 15
PIF_VALUE: 7
PIF_VALUE: 24
PIF_VALUE: 14
PIF_VALUE: 16
PIF_VALUE: 1
PIF_VALUE: 24
PIF_VALUE: 15
PIF_VALUE: 0
PIF_VALUE: 15
PIF_VALUE: 3
PIF_VALUE: 3
PIF_VALUE: 23
PIF_VALUE: 15
PIF_VALUE: 24
PIF_VALUE: 15
PIF_VALUE: 24
PIF_VALUE: 23
PIF_VALUE: 22
PIF_VALUE: 15
PIF_VALUE: 23
PIF_VALUE: 1
PIF_VALUE: 17
PIF_VALUE: 2
PIF_VALUE: 1
PIF_VALUE: 15
PIF_VALUE: 24
PIF_VALUE: 24
PIF_VALUE: 23
PIF_VALUE: 15
PIF_VALUE: 23
PIF_VALUE: 21
PIF_VALUE: 15
PIF_VALUE: 24
PIF_VALUE: 15
PIF_VALUE: 15
PIF_VALUE: 24
PIF_VALUE: 24
PIF_VALUE: 16
PIF_VALUE: 2
PIF_VALUE: 24
PIF_VALUE: 22
PIF_VALUE: 22
PIF_VALUE: 24
PIF_VALUE: 23
PIF_VALUE: 24
PIF_VALUE: 23
PIF_VALUE: 23
PIF_VALUE: 5
PIF_VALUE: 23
PIF_VALUE: 23
PIF_VALUE: 24
PIF_VALUE: 17
PIF_VALUE: 23
PIF_VALUE: 26
PIF_VALUE: 15
PIF_VALUE: 2
PIF_VALUE: 24
PIF_VALUE: 2
PIF_VALUE: 24
PIF_VALUE: 24
PIF_VALUE: 23
PIF_VALUE: 22
PIF_VALUE: 23
PIF_VALUE: 15
PIF_VALUE: 24
PIF_VALUE: 20
PIF_VALUE: 24
PIF_VALUE: 23
PIF_VALUE: 1
PIF_VALUE: 22
PIF_VALUE: 15
PIF_VALUE: 20
PIF_VALUE: 3
PIF_VALUE: 23
PIF_VALUE: 24
PIF_VALUE: 23
PIF_VALUE: 20
PIF_VALUE: 15
PIF_VALUE: 24

## 2019-01-25 ASSESSMENT — PAIN SCALES - GENERAL
PAINLEVEL_OUTOF10: 7
PAINLEVEL_OUTOF10: 8
PAINLEVEL_OUTOF10: 3
PAINLEVEL_OUTOF10: 8
PAINLEVEL_OUTOF10: 8
PAINLEVEL_OUTOF10: 9
PAINLEVEL_OUTOF10: 8
PAINLEVEL_OUTOF10: 8
PAINLEVEL_OUTOF10: 0
PAINLEVEL_OUTOF10: 3
PAINLEVEL_OUTOF10: 9
PAINLEVEL_OUTOF10: 0
PAINLEVEL_OUTOF10: 8

## 2019-01-25 ASSESSMENT — PAIN - FUNCTIONAL ASSESSMENT
PAIN_FUNCTIONAL_ASSESSMENT: ACTIVITIES ARE NOT PREVENTED
PAIN_FUNCTIONAL_ASSESSMENT: PREVENTS OR INTERFERES SOME ACTIVE ACTIVITIES AND ADLS
PAIN_FUNCTIONAL_ASSESSMENT: 0-10
PAIN_FUNCTIONAL_ASSESSMENT: PREVENTS OR INTERFERES SOME ACTIVE ACTIVITIES AND ADLS

## 2019-01-25 ASSESSMENT — PAIN DESCRIPTION - PAIN TYPE
TYPE: SURGICAL PAIN

## 2019-01-25 ASSESSMENT — PAIN DESCRIPTION - LOCATION
LOCATION: ABDOMEN

## 2019-01-25 ASSESSMENT — PAIN DESCRIPTION - DESCRIPTORS
DESCRIPTORS: ACHING;BURNING;CONSTANT
DESCRIPTORS: ACHING;DISCOMFORT;NAGGING
DESCRIPTORS: ACHING;BURNING;CONSTANT

## 2019-01-25 ASSESSMENT — PAIN DESCRIPTION - ONSET: ONSET: GRADUAL

## 2019-01-25 ASSESSMENT — PAIN DESCRIPTION - FREQUENCY
FREQUENCY: CONTINUOUS
FREQUENCY: INTERMITTENT

## 2019-01-25 ASSESSMENT — PAIN DESCRIPTION - ORIENTATION: ORIENTATION: MID

## 2019-01-26 VITALS
BODY MASS INDEX: 26.18 KG/M2 | SYSTOLIC BLOOD PRESSURE: 102 MMHG | TEMPERATURE: 98.9 F | RESPIRATION RATE: 18 BRPM | DIASTOLIC BLOOD PRESSURE: 60 MMHG | HEART RATE: 60 BPM | HEIGHT: 74 IN | WEIGHT: 204 LBS | OXYGEN SATURATION: 92 %

## 2019-01-26 LAB
ALBUMIN SERPL-MCNC: 3.4 G/DL (ref 3.5–5.2)
ALP BLD-CCNC: 51 U/L (ref 40–129)
ALT SERPL-CCNC: 33 U/L (ref 0–40)
ANION GAP SERPL CALCULATED.3IONS-SCNC: 8 MMOL/L (ref 7–16)
AST SERPL-CCNC: 32 U/L (ref 0–39)
BILIRUB SERPL-MCNC: 0.3 MG/DL (ref 0–1.2)
BUN BLDV-MCNC: 16 MG/DL (ref 6–20)
CALCIUM SERPL-MCNC: 8.5 MG/DL (ref 8.6–10.2)
CHLORIDE BLD-SCNC: 103 MMOL/L (ref 98–107)
CO2: 26 MMOL/L (ref 22–29)
CREAT SERPL-MCNC: 0.9 MG/DL (ref 0.7–1.2)
GFR AFRICAN AMERICAN: >60
GFR NON-AFRICAN AMERICAN: >60 ML/MIN/1.73
GLUCOSE BLD-MCNC: 92 MG/DL (ref 74–99)
HCT VFR BLD CALC: 34.8 % (ref 37–54)
HEMOGLOBIN: 11.5 G/DL (ref 12.5–16.5)
MCH RBC QN AUTO: 29.7 PG (ref 26–35)
MCHC RBC AUTO-ENTMCNC: 33 % (ref 32–34.5)
MCV RBC AUTO: 89.9 FL (ref 80–99.9)
PDW BLD-RTO: 13.2 FL (ref 11.5–15)
PLATELET # BLD: 197 E9/L (ref 130–450)
PMV BLD AUTO: 11.2 FL (ref 7–12)
POTASSIUM SERPL-SCNC: 3.6 MMOL/L (ref 3.5–5)
RBC # BLD: 3.87 E12/L (ref 3.8–5.8)
SODIUM BLD-SCNC: 137 MMOL/L (ref 132–146)
TOTAL PROTEIN: 5.7 G/DL (ref 6.4–8.3)
WBC # BLD: 8.7 E9/L (ref 4.5–11.5)

## 2019-01-26 PROCEDURE — 85027 COMPLETE CBC AUTOMATED: CPT

## 2019-01-26 PROCEDURE — 80053 COMPREHEN METABOLIC PANEL: CPT

## 2019-01-26 PROCEDURE — 36415 COLL VENOUS BLD VENIPUNCTURE: CPT

## 2019-01-26 PROCEDURE — 6370000000 HC RX 637 (ALT 250 FOR IP): Performed by: SURGERY

## 2019-01-26 PROCEDURE — 6360000002 HC RX W HCPCS: Performed by: SURGERY

## 2019-01-26 PROCEDURE — 99024 POSTOP FOLLOW-UP VISIT: CPT | Performed by: SURGERY

## 2019-01-26 PROCEDURE — G0378 HOSPITAL OBSERVATION PER HR: HCPCS

## 2019-01-26 PROCEDURE — 96372 THER/PROPH/DIAG INJ SC/IM: CPT

## 2019-01-26 PROCEDURE — 2580000003 HC RX 258: Performed by: SURGERY

## 2019-01-26 RX ADMIN — OXYCODONE HYDROCHLORIDE 10 MG: 10 TABLET ORAL at 07:42

## 2019-01-26 RX ADMIN — ENOXAPARIN SODIUM 40 MG: 40 INJECTION SUBCUTANEOUS at 09:24

## 2019-01-26 RX ADMIN — Medication 10 ML: at 09:24

## 2019-01-26 RX ADMIN — PANTOPRAZOLE SODIUM 40 MG: 40 TABLET, DELAYED RELEASE ORAL at 09:23

## 2019-01-26 ASSESSMENT — PAIN DESCRIPTION - LOCATION: LOCATION: ABDOMEN

## 2019-01-26 ASSESSMENT — PAIN SCALES - GENERAL
PAINLEVEL_OUTOF10: 9
PAINLEVEL_OUTOF10: 4

## 2019-01-26 ASSESSMENT — PAIN DESCRIPTION - PAIN TYPE: TYPE: SURGICAL PAIN

## 2019-01-26 ASSESSMENT — PAIN DESCRIPTION - DESCRIPTORS: DESCRIPTORS: ACHING;CONSTANT;DISCOMFORT

## 2019-01-26 ASSESSMENT — PAIN DESCRIPTION - ORIENTATION: ORIENTATION: MID;LOWER

## 2019-01-28 ENCOUNTER — TELEPHONE (OUTPATIENT)
Dept: INTERNAL MEDICINE | Age: 54
End: 2019-01-28

## 2019-02-04 ENCOUNTER — TELEPHONE (OUTPATIENT)
Dept: INTERNAL MEDICINE | Age: 54
End: 2019-02-04

## 2019-02-06 ENCOUNTER — OFFICE VISIT (OUTPATIENT)
Dept: SURGERY | Age: 54
End: 2019-02-06

## 2019-02-06 VITALS
OXYGEN SATURATION: 96 % | WEIGHT: 202 LBS | BODY MASS INDEX: 25.93 KG/M2 | HEIGHT: 74 IN | SYSTOLIC BLOOD PRESSURE: 117 MMHG | DIASTOLIC BLOOD PRESSURE: 79 MMHG | HEART RATE: 66 BPM

## 2019-02-06 DIAGNOSIS — Z90.49 STATUS POST LAPAROSCOPIC CHOLECYSTECTOMY: ICD-10-CM

## 2019-02-06 PROCEDURE — 99024 POSTOP FOLLOW-UP VISIT: CPT | Performed by: SURGERY

## 2019-02-06 PROCEDURE — 99211 OFF/OP EST MAY X REQ PHY/QHP: CPT | Performed by: SURGERY

## 2019-02-11 ENCOUNTER — OFFICE VISIT (OUTPATIENT)
Dept: INTERNAL MEDICINE | Age: 54
End: 2019-02-11

## 2019-02-11 VITALS
BODY MASS INDEX: 25.54 KG/M2 | WEIGHT: 199 LBS | RESPIRATION RATE: 16 BRPM | HEART RATE: 58 BPM | SYSTOLIC BLOOD PRESSURE: 109 MMHG | TEMPERATURE: 97.1 F | HEIGHT: 74 IN | DIASTOLIC BLOOD PRESSURE: 70 MMHG

## 2019-02-11 DIAGNOSIS — Z12.11 ENCOUNTER FOR SCREENING COLONOSCOPY: Primary | ICD-10-CM

## 2019-02-11 DIAGNOSIS — R91.8 PULMONARY NODULES: ICD-10-CM

## 2019-02-11 DIAGNOSIS — Z90.49 HISTORY OF CHOLECYSTECTOMY: ICD-10-CM

## 2019-02-11 DIAGNOSIS — Z23 ENCOUNTER FOR VACCINATION: ICD-10-CM

## 2019-02-11 PROBLEM — G89.18 POST-OPERATIVE PAIN: Status: RESOLVED | Noted: 2019-01-25 | Resolved: 2019-02-11

## 2019-02-11 PROCEDURE — 90715 TDAP VACCINE 7 YRS/> IM: CPT

## 2019-02-11 PROCEDURE — 90471 IMMUNIZATION ADMIN: CPT

## 2019-02-11 PROCEDURE — 99213 OFFICE O/P EST LOW 20 MIN: CPT | Performed by: INTERNAL MEDICINE

## 2019-02-11 PROCEDURE — 6360000002 HC RX W HCPCS

## 2019-02-11 ASSESSMENT — PATIENT HEALTH QUESTIONNAIRE - PHQ9
2. FEELING DOWN, DEPRESSED OR HOPELESS: 0
SUM OF ALL RESPONSES TO PHQ QUESTIONS 1-9: 0
SUM OF ALL RESPONSES TO PHQ QUESTIONS 1-9: 0
1. LITTLE INTEREST OR PLEASURE IN DOING THINGS: 0
SUM OF ALL RESPONSES TO PHQ9 QUESTIONS 1 & 2: 0

## 2019-02-11 ASSESSMENT — ENCOUNTER SYMPTOMS
EYES NEGATIVE: 1
VOMITING: 0
WHEEZING: 0
NAUSEA: 0
ABDOMINAL PAIN: 0
BACK PAIN: 0
COUGH: 0
DIARRHEA: 0
CONSTIPATION: 0

## 2019-05-05 ENCOUNTER — HOSPITAL ENCOUNTER (EMERGENCY)
Age: 54
Discharge: HOME | End: 2019-05-06
Attending: EMERGENCY MEDICINE

## 2019-05-05 ENCOUNTER — APPOINTMENT (OUTPATIENT)
Dept: GENERAL RADIOLOGY | Age: 54
End: 2019-05-05

## 2019-05-05 DIAGNOSIS — R11.2 NAUSEA VOMITING AND DIARRHEA: Primary | ICD-10-CM

## 2019-05-05 DIAGNOSIS — R06.02 SOB (SHORTNESS OF BREATH): ICD-10-CM

## 2019-05-05 DIAGNOSIS — R19.7 NAUSEA VOMITING AND DIARRHEA: Primary | ICD-10-CM

## 2019-05-05 LAB
ALBUMIN SERPL-MCNC: 4.6 G/DL (ref 3.5–5.2)
ALP BLD-CCNC: 83 U/L (ref 40–129)
ALT SERPL-CCNC: 25 U/L (ref 0–40)
ANION GAP SERPL CALCULATED.3IONS-SCNC: 10 MMOL/L (ref 7–16)
AST SERPL-CCNC: 22 U/L (ref 0–39)
BILIRUB SERPL-MCNC: 0.3 MG/DL (ref 0–1.2)
BUN BLDV-MCNC: 22 MG/DL (ref 6–20)
CALCIUM SERPL-MCNC: 9 MG/DL (ref 8.6–10.2)
CHLORIDE BLD-SCNC: 105 MMOL/L (ref 98–107)
CO2: 24 MMOL/L (ref 22–29)
CREAT SERPL-MCNC: 0.9 MG/DL (ref 0.7–1.2)
GFR AFRICAN AMERICAN: >60
GFR NON-AFRICAN AMERICAN: >60 ML/MIN/1.73
GLUCOSE BLD-MCNC: 134 MG/DL (ref 74–99)
HCT VFR BLD CALC: 46.7 % (ref 37–54)
HEMOGLOBIN: 15.2 G/DL (ref 12.5–16.5)
LACTIC ACID: 1.3 MMOL/L (ref 0.5–2.2)
MCH RBC QN AUTO: 29.1 PG (ref 26–35)
MCHC RBC AUTO-ENTMCNC: 32.5 % (ref 32–34.5)
MCV RBC AUTO: 89.5 FL (ref 80–99.9)
PDW BLD-RTO: 13 FL (ref 11.5–15)
PLATELET # BLD: 253 E9/L (ref 130–450)
PMV BLD AUTO: 10.7 FL (ref 7–12)
POTASSIUM SERPL-SCNC: 4 MMOL/L (ref 3.5–5)
RBC # BLD: 5.22 E12/L (ref 3.8–5.8)
SODIUM BLD-SCNC: 139 MMOL/L (ref 132–146)
TOTAL PROTEIN: 8.1 G/DL (ref 6.4–8.3)
WBC # BLD: 8.5 E9/L (ref 4.5–11.5)

## 2019-05-05 PROCEDURE — 71045 X-RAY EXAM CHEST 1 VIEW: CPT

## 2019-05-05 PROCEDURE — 85027 COMPLETE CBC AUTOMATED: CPT

## 2019-05-05 PROCEDURE — 83690 ASSAY OF LIPASE: CPT

## 2019-05-05 PROCEDURE — 93005 ELECTROCARDIOGRAM TRACING: CPT | Performed by: EMERGENCY MEDICINE

## 2019-05-05 PROCEDURE — 80053 COMPREHEN METABOLIC PANEL: CPT

## 2019-05-05 PROCEDURE — 83880 ASSAY OF NATRIURETIC PEPTIDE: CPT

## 2019-05-05 PROCEDURE — 83605 ASSAY OF LACTIC ACID: CPT

## 2019-05-05 PROCEDURE — 84484 ASSAY OF TROPONIN QUANT: CPT

## 2019-05-05 PROCEDURE — 99285 EMERGENCY DEPT VISIT HI MDM: CPT

## 2019-05-05 PROCEDURE — 36415 COLL VENOUS BLD VENIPUNCTURE: CPT

## 2019-05-05 RX ORDER — SODIUM CHLORIDE 9 MG/ML
INJECTION, SOLUTION INTRAVENOUS CONTINUOUS
Status: DISCONTINUED | OUTPATIENT
Start: 2019-05-05 | End: 2019-05-06 | Stop reason: HOSPADM

## 2019-05-05 NOTE — LETTER
[facility] Emergency Department  [address] [place] [id number]  Phone: [phone]             May 6, 2019    Patient: Roxane Obrien   YOB: 1965   Date of Visit: 5/5/2019       To Whom It May Concern:    Jade Kaur was seen and treated in our emergency department on 5/5/2019.  He may return to Work/School on the following date 5/7/19      Sincerely,       Yeyo Greenwood RN         Signature:__________________________________

## 2019-05-06 ENCOUNTER — APPOINTMENT (OUTPATIENT)
Dept: CT IMAGING | Age: 54
End: 2019-05-06

## 2019-05-06 VITALS
HEART RATE: 76 BPM | OXYGEN SATURATION: 100 % | RESPIRATION RATE: 18 BRPM | SYSTOLIC BLOOD PRESSURE: 126 MMHG | DIASTOLIC BLOOD PRESSURE: 79 MMHG | TEMPERATURE: 97.8 F

## 2019-05-06 LAB
BILIRUBIN URINE: NEGATIVE
BLOOD, URINE: NEGATIVE
CLARITY: CLEAR
COLOR: YELLOW
D DIMER: <200 NG/ML DDU
GLUCOSE URINE: NEGATIVE MG/DL
KETONES, URINE: NEGATIVE MG/DL
LEUKOCYTE ESTERASE, URINE: NEGATIVE
LIPASE: 22 U/L (ref 13–60)
NITRITE, URINE: NEGATIVE
PH UA: 5.5 (ref 5–9)
PRO-BNP: <5 PG/ML (ref 0–125)
PROTEIN UA: NEGATIVE MG/DL
SPECIFIC GRAVITY UA: >=1.03 (ref 1–1.03)
TROPONIN: <0.01 NG/ML (ref 0–0.03)
UROBILINOGEN, URINE: 0.2 E.U./DL

## 2019-05-06 PROCEDURE — 36415 COLL VENOUS BLD VENIPUNCTURE: CPT

## 2019-05-06 PROCEDURE — 6360000004 HC RX CONTRAST MEDICATION: Performed by: RADIOLOGY

## 2019-05-06 PROCEDURE — 71275 CT ANGIOGRAPHY CHEST: CPT

## 2019-05-06 PROCEDURE — 81003 URINALYSIS AUTO W/O SCOPE: CPT

## 2019-05-06 PROCEDURE — 85378 FIBRIN DEGRADE SEMIQUANT: CPT

## 2019-05-06 PROCEDURE — 2580000003 HC RX 258: Performed by: EMERGENCY MEDICINE

## 2019-05-06 PROCEDURE — 2580000003 HC RX 258: Performed by: RADIOLOGY

## 2019-05-06 PROCEDURE — 74177 CT ABD & PELVIS W/CONTRAST: CPT

## 2019-05-06 RX ORDER — SODIUM CHLORIDE 0.9 % (FLUSH) 0.9 %
10 SYRINGE (ML) INJECTION PRN
Status: COMPLETED | OUTPATIENT
Start: 2019-05-06 | End: 2019-05-06

## 2019-05-06 RX ADMIN — SODIUM CHLORIDE: 9 INJECTION, SOLUTION INTRAVENOUS at 02:33

## 2019-05-06 RX ADMIN — IOPAMIDOL 110 ML: 755 INJECTION, SOLUTION INTRAVENOUS at 01:48

## 2019-05-06 RX ADMIN — Medication 10 ML: at 01:48

## 2019-05-06 NOTE — ED NOTES
Pt alert and oriented x4. Speech clear. Respirations easy/unlabored. Skin warm/dry. Appropriate color. No signs of acute distress noted. Pt/family teaching provided; verbalized understanding. Pt stable for discharge.      Darlene Rand RN  05/06/19 [id number]

## 2019-05-06 NOTE — ED PROVIDER NOTES
Department of Emergency Medicine   ED  Provider Note  Admit Date/RoomTime: 5/5/2019 11:32 PM  ED Room: 22/22      History of Present Illness:  5/5/19, Time: 11:25 PM         Izabela Burnett is a 62 y.o. male presenting to the ED for diarrhea, beginning 2 weeks ago. The complaint has been persistent, moderate in severity, and worsened by nothing. Patient also presents with nausea and emesis, beginning today. Patient also reports that he has been short of breath with exertion for the past few days. Patient has a hx of bowel obstructions, cholecystectomy, appendectomy, etc. Patient denies fever, chills, CP, hematochezia, melena, hematemesis, constipation, trauma, urinary symptoms, abdominal pain, headache, dizziness, loss of consciousness, or any other pertinent complaints. Review of Systems:   Pertinent positives and negatives are stated within HPI, all other systems reviewed and are negative.    --------------------------------------------- PAST HISTORY ---------------------------------------------  Past Medical History:  has no past medical history on file. Past Surgical History:  has a past surgical history that includes Abdomen surgery; Appendectomy (1970); Abdominal adhesion surgery; and Cholecystectomy, laparoscopic (N/A, 1/25/2019). Social History:  reports that he has never smoked. He has never used smokeless tobacco. He reports that he does not drink alcohol or use drugs. Family History: family history includes Breast Cancer in his mother; Cancer in his father; Kidney Disease in his father. The patients home medications have been reviewed.     Allergies: Asa [aspirin]; Pcn [penicillins]; and Rye grass flower pollen extract [gramineae pollens]    ---------------------------------------------------PHYSICAL EXAM--------------------------------------    Constitutional/General: Alert and oriented x3,mild distress  Head: Normocephalic and atraumatic  Eyes: PERRL, EOMI, conjunctiva normal, sclera non icteric  Mouth: Oropharynx clear  Neck: Supple  Respiratory: Lungs clear to auscultation bilaterally, no wheezes, rales, or rhonchi. Not in respiratory distress  Cardiovascular:  Regular rate. Regular rhythm. No murmurs, gallops, or rubs. 2+ distal pulses  GI:  Abdomen Soft, Non tender, Non distended. +BS. noted scars from prior surgery No rebound, guarding, or rigidity. No pulsatile masses. Musculoskeletal: Moves all extremities x 4. Warm and well perfused, no clubbing, cyanosis, or edema. Integument: Skin warm and dry. No rashes. Neurologic: GCS 15, no focal deficits    -------------------------------------------------- RESULTS -------------------------------------------------  I have personally reviewed all laboratory and imaging results for this patient. Results are listed below.      LABS:  Results for orders placed or performed during the hospital encounter of 05/05/19   CBC   Result Value Ref Range    WBC 8.5 4.5 - 11.5 E9/L    RBC 5.22 3.80 - 5.80 E12/L    Hemoglobin 15.2 12.5 - 16.5 g/dL    Hematocrit 46.7 37.0 - 54.0 %    MCV 89.5 80.0 - 99.9 fL    MCH 29.1 26.0 - 35.0 pg    MCHC 32.5 32.0 - 34.5 %    RDW 13.0 11.5 - 15.0 fL    Platelets 247 787 - 449 E9/L    MPV 10.7 7.0 - 12.0 fL   Comprehensive Metabolic Panel   Result Value Ref Range    Sodium 139 132 - 146 mmol/L    Potassium 4.0 3.5 - 5.0 mmol/L    Chloride 105 98 - 107 mmol/L    CO2 24 22 - 29 mmol/L    Anion Gap 10 7 - 16 mmol/L    Glucose 134 (H) 74 - 99 mg/dL    BUN 22 (H) 6 - 20 mg/dL    CREATININE 0.9 0.7 - 1.2 mg/dL    GFR Non-African American >60 >=60 mL/min/1.73    GFR African American >60     Calcium 9.0 8.6 - 10.2 mg/dL    Total Protein 8.1 6.4 - 8.3 g/dL    Alb 4.6 3.5 - 5.2 g/dL    Total Bilirubin 0.3 0.0 - 1.2 mg/dL    Alkaline Phosphatase 83 40 - 129 U/L    ALT 25 0 - 40 U/L    AST 22 0 - 39 U/L   Lactic Acid, Plasma   Result Value Ref Range    Lactic Acid 1.3 0.5 - 2.2 mmol/L   Urinalysis   Result Value Ref Range Color, UA Yellow Straw/Yellow    Clarity, UA Clear Clear    Glucose, Ur Negative Negative mg/dL    Bilirubin Urine Negative Negative    Ketones, Urine Negative Negative mg/dL    Specific Gravity, UA >=1.030 1.005 - 1.030    Blood, Urine Negative Negative    pH, UA 5.5 5.0 - 9.0    Protein, UA Negative Negative mg/dL    Urobilinogen, Urine 0.2 <2.0 E.U./dL    Nitrite, Urine Negative Negative    Leukocyte Esterase, Urine Negative Negative   Lipase   Result Value Ref Range    Lipase 22 13 - 60 U/L   Brain Natriuretic Peptide   Result Value Ref Range    Pro-BNP <5 0 - 125 pg/mL   D-Dimer, Quantitative   Result Value Ref Range    D-Dimer, Quant <200 ng/mL DDU       RADIOLOGY:  Interpreted by Radiologist.  CTA CHEST W CONTRAST   Final Result   No pulmonary emboli. This report has been electronically signed by Mars Cross MD.      Lilli Lin Additional Contrast? None   Final Result   Few loops of gas and fluid-filled mid and distal small bowel and proximal    colon, likely enteritis/diarrhea. This report has been electronically signed by Mars Cross MD.      XR CHEST PORTABLE   Final Result      Negative one view chest.          EKG: This EKG is signed and interpreted by the EP. Time: 22:27  Rate: sinus  Rhythm: 78  Interpretation: no acute changes  Comparison: stable as compared to patient's most recent EKG    ------------------------- NURSING NOTES AND VITALS REVIEWED ---------------------------   The nursing notes within the ED encounter and vital signs as below have been reviewed by myself. /88   Pulse 70   Temp 97.8 °F (36.6 °C) (Infrared)   Resp 16   SpO2 99%   Oxygen Saturation Interpretation: Normal    The patients available past medical records and past encounters were reviewed.       ------------------------------ ED COURSE/MEDICAL DECISION MAKING----------------------  Medications   0.9 % sodium chloride infusion ( Intravenous New Bag 5/6/19 0233)   sodium chloride flush 0.9 % injection 10 mL (10 mLs Intravenous Given 5/6/19 0148)   iopamidol (ISOVUE-370) 76 % injection 110 mL (110 mLs Intravenous Given 5/6/19 0148)       Medical Decision Making:      Patient presents to the ED for diarrhea, nausea, emesis, and exertional shortness of breath. Patient examined. Ordered and obtained labs, imaging, EKG. Reviewed and discussed results and findings with the patient and patient's spouse. This patient's ED course included: a personal history and physicial examination, re-evaluation prior to disposition, multiple bedside re-evaluations and IV medications    This patient has been closely monitored during their ED course. Re-Evaluations:           Re-evaluation. Patients symptoms are improving  patient rechecked and abdomen soft nontender and vital signs stable. Patient having no chest pain and made aware he is to return if he develops any  Worsening symptoms. patient is comfortable with plan  Consultations:                 Counseling: The emergency provider has spoken with the patient and spouse/SO and discussed todays results, in addition to providing specific details for the plan of care and counseling regarding the diagnosis and prognosis. Questions are answered at this time and they are agreeable with the plan.     --------------------------------- IMPRESSION AND DISPOSITION ---------------------------------    IMPRESSION  1. Nausea vomiting and diarrhea    2. SOB (shortness of breath)        DISPOSITION  Disposition: Discharge to home  Patient condition is stable    5/5/19, 11:25 PM.    This note is prepared by Meg Vallejo, acting as Scribe for Kaylin Mcmullen MD.    Kaylin Mcmullen MD:  The scribe's documentation has been prepared under my direction and personally reviewed by me in its entirety. I confirm that the note above accurately reflects all work, treatment, procedures, and medical decision making performed by me.          Kaylin Mcmullen

## 2019-05-06 NOTE — ED NOTES
Lab verifies that they saw Add-On troponin and will result it when they can.      Lenny Hunter RN  05/06/19 [id number]

## 2019-05-07 LAB
EKG ATRIAL RATE: 78 BPM
EKG P AXIS: 45 DEGREES
EKG P-R INTERVAL: 142 MS
EKG Q-T INTERVAL: 378 MS
EKG QRS DURATION: 90 MS
EKG QTC CALCULATION (BAZETT): 430 MS
EKG R AXIS: 5 DEGREES
EKG T AXIS: 5 DEGREES
EKG VENTRICULAR RATE: 78 BPM

## 2019-05-07 PROCEDURE — 93010 ELECTROCARDIOGRAM REPORT: CPT | Performed by: INTERNAL MEDICINE

## 2021-06-07 ENCOUNTER — APPOINTMENT (OUTPATIENT)
Dept: CT IMAGING | Age: 56
DRG: 139 | End: 2021-06-07
Payer: MEDICAID

## 2021-06-07 ENCOUNTER — APPOINTMENT (OUTPATIENT)
Dept: GENERAL RADIOLOGY | Age: 56
DRG: 139 | End: 2021-06-07
Payer: MEDICAID

## 2021-06-07 ENCOUNTER — HOSPITAL ENCOUNTER (INPATIENT)
Age: 56
LOS: 2 days | Discharge: HOME | DRG: 139 | End: 2021-06-10
Attending: EMERGENCY MEDICINE | Admitting: INTERNAL MEDICINE
Payer: MEDICAID

## 2021-06-07 DIAGNOSIS — J18.9 PNEUMONIA OF RIGHT UPPER LOBE DUE TO INFECTIOUS ORGANISM: Primary | ICD-10-CM

## 2021-06-07 LAB
ALBUMIN SERPL-MCNC: 3.9 G/DL (ref 3.5–5.2)
ALP BLD-CCNC: 112 U/L (ref 40–129)
ALT SERPL-CCNC: 62 U/L (ref 0–40)
AMORPHOUS: ABNORMAL
ANION GAP SERPL CALCULATED.3IONS-SCNC: 16 MMOL/L (ref 7–16)
AST SERPL-CCNC: 58 U/L (ref 0–39)
BACTERIA: ABNORMAL /HPF
BASOPHILS ABSOLUTE: 0.03 E9/L (ref 0–0.2)
BASOPHILS RELATIVE PERCENT: 0.2 % (ref 0–2)
BILIRUB SERPL-MCNC: 0.8 MG/DL (ref 0–1.2)
BILIRUBIN URINE: ABNORMAL
BLOOD, URINE: ABNORMAL
BUN BLDV-MCNC: 18 MG/DL (ref 6–20)
CALCIUM SERPL-MCNC: 8.9 MG/DL (ref 8.6–10.2)
CHLORIDE BLD-SCNC: 101 MMOL/L (ref 98–107)
CLARITY: CLEAR
CO2: 20 MMOL/L (ref 22–29)
COARSE CASTS, UA: ABNORMAL /LPF (ref 0–2)
COLOR: YELLOW
CREAT SERPL-MCNC: 1.2 MG/DL (ref 0.7–1.2)
D DIMER: 525 NG/ML DDU
EOSINOPHILS ABSOLUTE: 0 E9/L (ref 0.05–0.5)
EOSINOPHILS RELATIVE PERCENT: 0 % (ref 0–6)
GFR AFRICAN AMERICAN: >60
GFR NON-AFRICAN AMERICAN: >60 ML/MIN/1.73
GLUCOSE BLD-MCNC: 138 MG/DL (ref 74–99)
GLUCOSE URINE: NEGATIVE MG/DL
HCT VFR BLD CALC: 41.6 % (ref 37–54)
HEMOGLOBIN: 13.6 G/DL (ref 12.5–16.5)
IMMATURE GRANULOCYTES #: 0.05 E9/L
IMMATURE GRANULOCYTES %: 0.3 % (ref 0–5)
KETONES, URINE: 40 MG/DL
LEUKOCYTE ESTERASE, URINE: NEGATIVE
LYMPHOCYTES ABSOLUTE: 0.89 E9/L (ref 1.5–4)
LYMPHOCYTES RELATIVE PERCENT: 6 % (ref 20–42)
MAGNESIUM: 2.1 MG/DL (ref 1.6–2.6)
MCH RBC QN AUTO: 28.9 PG (ref 26–35)
MCHC RBC AUTO-ENTMCNC: 32.7 % (ref 32–34.5)
MCV RBC AUTO: 88.3 FL (ref 80–99.9)
MONOCYTES ABSOLUTE: 1.02 E9/L (ref 0.1–0.95)
MONOCYTES RELATIVE PERCENT: 6.9 % (ref 2–12)
NEUTROPHILS ABSOLUTE: 12.81 E9/L (ref 1.8–7.3)
NEUTROPHILS RELATIVE PERCENT: 86.6 % (ref 43–80)
NITRITE, URINE: NEGATIVE
PDW BLD-RTO: 13.2 FL (ref 11.5–15)
PH UA: 5.5 (ref 5–9)
PLATELET # BLD: 218 E9/L (ref 130–450)
PMV BLD AUTO: 11 FL (ref 7–12)
POTASSIUM SERPL-SCNC: 4 MMOL/L (ref 3.5–5)
PRO-BNP: 211 PG/ML (ref 0–125)
PROTEIN UA: 100 MG/DL
RBC # BLD: 4.71 E12/L (ref 3.8–5.8)
RBC UA: ABNORMAL /HPF (ref 0–2)
SARS-COV-2, NAAT: NOT DETECTED
SODIUM BLD-SCNC: 137 MMOL/L (ref 132–146)
SPECIFIC GRAVITY UA: >=1.03 (ref 1–1.03)
TOTAL PROTEIN: 7.6 G/DL (ref 6.4–8.3)
TROPONIN, HIGH SENSITIVITY: <6 NG/L (ref 0–11)
UROBILINOGEN, URINE: 4 E.U./DL
WBC # BLD: 14.8 E9/L (ref 4.5–11.5)
WBC UA: ABNORMAL /HPF (ref 0–5)

## 2021-06-07 PROCEDURE — 83880 ASSAY OF NATRIURETIC PEPTIDE: CPT

## 2021-06-07 PROCEDURE — 81001 URINALYSIS AUTO W/SCOPE: CPT

## 2021-06-07 PROCEDURE — 85025 COMPLETE CBC W/AUTO DIFF WBC: CPT

## 2021-06-07 PROCEDURE — 83735 ASSAY OF MAGNESIUM: CPT

## 2021-06-07 PROCEDURE — 87635 SARS-COV-2 COVID-19 AMP PRB: CPT

## 2021-06-07 PROCEDURE — 84484 ASSAY OF TROPONIN QUANT: CPT

## 2021-06-07 PROCEDURE — 99285 EMERGENCY DEPT VISIT HI MDM: CPT

## 2021-06-07 PROCEDURE — 71275 CT ANGIOGRAPHY CHEST: CPT

## 2021-06-07 PROCEDURE — 93005 ELECTROCARDIOGRAM TRACING: CPT | Performed by: NURSE PRACTITIONER

## 2021-06-07 PROCEDURE — 2580000003 HC RX 258: Performed by: RADIOLOGY

## 2021-06-07 PROCEDURE — 6360000004 HC RX CONTRAST MEDICATION: Performed by: RADIOLOGY

## 2021-06-07 PROCEDURE — 80053 COMPREHEN METABOLIC PANEL: CPT

## 2021-06-07 PROCEDURE — 85378 FIBRIN DEGRADE SEMIQUANT: CPT

## 2021-06-07 PROCEDURE — 71046 X-RAY EXAM CHEST 2 VIEWS: CPT

## 2021-06-07 RX ORDER — SODIUM CHLORIDE 0.9 % (FLUSH) 0.9 %
10 SYRINGE (ML) INJECTION
Status: COMPLETED | OUTPATIENT
Start: 2021-06-07 | End: 2021-06-07

## 2021-06-07 RX ADMIN — Medication 10 ML: at 23:46

## 2021-06-07 RX ADMIN — IOPAMIDOL 60 ML: 755 INJECTION, SOLUTION INTRAVENOUS at 23:46

## 2021-06-07 ASSESSMENT — PAIN DESCRIPTION - PAIN TYPE: TYPE: ACUTE PAIN

## 2021-06-07 ASSESSMENT — PAIN SCALES - GENERAL: PAINLEVEL_OUTOF10: 10

## 2021-06-07 NOTE — LETTER
[facility]  [address] [facility] [address]  [address] [address]  Phone: [phone]          Val 10, 2021     Patient: Ayah David   YOB: 1965   Date of Visit: 6/7/2021       To Whom It May Concern: It is my medical opinion that Anthony Burch should remain out of work until further evaluation on his follow-up with the [facility] on 6/21/2021. If you have any questions or concerns, please don't hesitate to call.     Sincerely,        Lum Kocher MD

## 2021-06-07 NOTE — ED NOTES
FIRST PROVIDER CONTACT ASSESSMENT NOTE      Department of Emergency Medicine   Admit Date: No admission date for patient encounter. Chief Complaint: Shortness of Breath (cough, cold symptoms, fever, cp since saturday, has had both vaccines (last one 5-17) )      History of Present Illness:    Rick Colindres is a 60 y.o. male who presents to the ED for complaints of cough, cold symptoms fever intermittent chest pain for the last several days states he completed both Covid vaccines most recently in May 17.   He appears pale has an intermittent episodes of diaphoresis and worsening shortness of breath.        -----------------END OF FIRST PROVIDER CONTACT ASSESSMENT NOTE--------------  Electronically signed by RALF Segovia CNP   DD: 6/7/21               RALF Barahona CNP  06/07/21 1532

## 2021-06-08 PROBLEM — J18.9 PNEUMONIA: Status: ACTIVE | Noted: 2021-06-08

## 2021-06-08 LAB
ADENOVIRUS BY PCR: NOT DETECTED
ALBUMIN SERPL-MCNC: 3.6 G/DL (ref 3.5–5.2)
ALP BLD-CCNC: 99 U/L (ref 40–129)
ALT SERPL-CCNC: 55 U/L (ref 0–40)
AST SERPL-CCNC: 40 U/L (ref 0–39)
BILIRUB SERPL-MCNC: 0.4 MG/DL (ref 0–1.2)
BILIRUBIN DIRECT: <0.2 MG/DL (ref 0–0.3)
BILIRUBIN, INDIRECT: ABNORMAL MG/DL (ref 0–1)
BORDETELLA PARAPERTUSSIS BY PCR: NOT DETECTED
BORDETELLA PERTUSSIS BY PCR: NOT DETECTED
CHLAMYDOPHILIA PNEUMONIAE BY PCR: NOT DETECTED
CORONAVIRUS 229E BY PCR: NOT DETECTED
CORONAVIRUS HKU1 BY PCR: NOT DETECTED
CORONAVIRUS NL63 BY PCR: NOT DETECTED
CORONAVIRUS OC43 BY PCR: NOT DETECTED
EKG ATRIAL RATE: 91 BPM
EKG P AXIS: 43 DEGREES
EKG P-R INTERVAL: 148 MS
EKG Q-T INTERVAL: 332 MS
EKG QRS DURATION: 94 MS
EKG QTC CALCULATION (BAZETT): 408 MS
EKG R AXIS: 20 DEGREES
EKG VENTRICULAR RATE: 91 BPM
HIV-1 AND HIV-2 ANTIBODIES: NORMAL
HUMAN METAPNEUMOVIRUS BY PCR: NOT DETECTED
HUMAN RHINOVIRUS/ENTEROVIRUS BY PCR: NOT DETECTED
INFLUENZA A BY PCR: NOT DETECTED
INFLUENZA B BY PCR: NOT DETECTED
MAGNESIUM: 2.3 MG/DL (ref 1.6–2.6)
MYCOPLASMA PNEUMONIAE BY PCR: NOT DETECTED
PARAINFLUENZA VIRUS 1 BY PCR: NOT DETECTED
PARAINFLUENZA VIRUS 2 BY PCR: NOT DETECTED
PARAINFLUENZA VIRUS 3 BY PCR: NOT DETECTED
PARAINFLUENZA VIRUS 4 BY PCR: NOT DETECTED
PHOSPHORUS: 2.7 MG/DL (ref 2.5–4.5)
PROCALCITONIN: 1.7 NG/ML (ref 0–0.08)
RESPIRATORY SYNCYTIAL VIRUS BY PCR: NOT DETECTED
SARS-COV-2, PCR: NOT DETECTED
TOTAL PROTEIN: 7.1 G/DL (ref 6.4–8.3)

## 2021-06-08 PROCEDURE — 84145 PROCALCITONIN (PCT): CPT

## 2021-06-08 PROCEDURE — 84100 ASSAY OF PHOSPHORUS: CPT

## 2021-06-08 PROCEDURE — 80076 HEPATIC FUNCTION PANEL: CPT

## 2021-06-08 PROCEDURE — 6370000000 HC RX 637 (ALT 250 FOR IP): Performed by: STUDENT IN AN ORGANIZED HEALTH CARE EDUCATION/TRAINING PROGRAM

## 2021-06-08 PROCEDURE — 6370000000 HC RX 637 (ALT 250 FOR IP): Performed by: INTERNAL MEDICINE

## 2021-06-08 PROCEDURE — 86703 HIV-1/HIV-2 1 RESULT ANTBDY: CPT

## 2021-06-08 PROCEDURE — 6360000002 HC RX W HCPCS: Performed by: INTERNAL MEDICINE

## 2021-06-08 PROCEDURE — 2580000003 HC RX 258: Performed by: STUDENT IN AN ORGANIZED HEALTH CARE EDUCATION/TRAINING PROGRAM

## 2021-06-08 PROCEDURE — 99231 SBSQ HOSP IP/OBS SF/LOW 25: CPT | Performed by: INTERNAL MEDICINE

## 2021-06-08 PROCEDURE — 6360000002 HC RX W HCPCS: Performed by: STUDENT IN AN ORGANIZED HEALTH CARE EDUCATION/TRAINING PROGRAM

## 2021-06-08 PROCEDURE — 96374 THER/PROPH/DIAG INJ IV PUSH: CPT

## 2021-06-08 PROCEDURE — 93010 ELECTROCARDIOGRAM REPORT: CPT | Performed by: INTERNAL MEDICINE

## 2021-06-08 PROCEDURE — 0202U NFCT DS 22 TRGT SARS-COV-2: CPT

## 2021-06-08 PROCEDURE — 87040 BLOOD CULTURE FOR BACTERIA: CPT

## 2021-06-08 PROCEDURE — 2580000003 HC RX 258: Performed by: INTERNAL MEDICINE

## 2021-06-08 PROCEDURE — 1200000000 HC SEMI PRIVATE

## 2021-06-08 PROCEDURE — 83735 ASSAY OF MAGNESIUM: CPT

## 2021-06-08 RX ORDER — PROMETHAZINE HYDROCHLORIDE 25 MG/1
12.5 TABLET ORAL EVERY 6 HOURS PRN
Status: DISCONTINUED | OUTPATIENT
Start: 2021-06-08 | End: 2021-06-10 | Stop reason: HOSPADM

## 2021-06-08 RX ORDER — POLYETHYLENE GLYCOL 3350 17 G/17G
17 POWDER, FOR SOLUTION ORAL DAILY PRN
Status: DISCONTINUED | OUTPATIENT
Start: 2021-06-08 | End: 2021-06-10 | Stop reason: HOSPADM

## 2021-06-08 RX ORDER — LEVOFLOXACIN 5 MG/ML
750 INJECTION, SOLUTION INTRAVENOUS EVERY 24 HOURS
Status: DISCONTINUED | OUTPATIENT
Start: 2021-06-08 | End: 2021-06-10 | Stop reason: CLARIF

## 2021-06-08 RX ORDER — ONDANSETRON 2 MG/ML
4 INJECTION INTRAMUSCULAR; INTRAVENOUS ONCE
Status: COMPLETED | OUTPATIENT
Start: 2021-06-08 | End: 2021-06-08

## 2021-06-08 RX ORDER — SODIUM CHLORIDE 0.9 % (FLUSH) 0.9 %
10 SYRINGE (ML) INJECTION PRN
Status: DISCONTINUED | OUTPATIENT
Start: 2021-06-08 | End: 2021-06-10 | Stop reason: HOSPADM

## 2021-06-08 RX ORDER — SODIUM CHLORIDE 0.9 % (FLUSH) 0.9 %
10 SYRINGE (ML) INJECTION
Status: DISPENSED | OUTPATIENT
Start: 2021-06-08 | End: 2021-06-08

## 2021-06-08 RX ORDER — LEVOFLOXACIN 5 MG/ML
750 INJECTION, SOLUTION INTRAVENOUS ONCE
Status: COMPLETED | OUTPATIENT
Start: 2021-06-08 | End: 2021-06-08

## 2021-06-08 RX ORDER — ACETAMINOPHEN 650 MG/1
650 SUPPOSITORY RECTAL EVERY 6 HOURS PRN
Status: DISCONTINUED | OUTPATIENT
Start: 2021-06-08 | End: 2021-06-10 | Stop reason: HOSPADM

## 2021-06-08 RX ORDER — LOPERAMIDE HYDROCHLORIDE 2 MG/1
2 CAPSULE ORAL 4 TIMES DAILY PRN
Status: DISCONTINUED | OUTPATIENT
Start: 2021-06-08 | End: 2021-06-10 | Stop reason: HOSPADM

## 2021-06-08 RX ORDER — SODIUM CHLORIDE 9 MG/ML
25 INJECTION, SOLUTION INTRAVENOUS PRN
Status: DISCONTINUED | OUTPATIENT
Start: 2021-06-08 | End: 2021-06-10 | Stop reason: HOSPADM

## 2021-06-08 RX ORDER — ACETAMINOPHEN 500 MG
1000 TABLET ORAL ONCE
Status: COMPLETED | OUTPATIENT
Start: 2021-06-08 | End: 2021-06-08

## 2021-06-08 RX ORDER — ONDANSETRON 2 MG/ML
4 INJECTION INTRAMUSCULAR; INTRAVENOUS EVERY 6 HOURS PRN
Status: DISCONTINUED | OUTPATIENT
Start: 2021-06-08 | End: 2021-06-10 | Stop reason: HOSPADM

## 2021-06-08 RX ORDER — 0.9 % SODIUM CHLORIDE 0.9 %
1000 INTRAVENOUS SOLUTION INTRAVENOUS ONCE
Status: COMPLETED | OUTPATIENT
Start: 2021-06-08 | End: 2021-06-08

## 2021-06-08 RX ORDER — SODIUM CHLORIDE 0.9 % (FLUSH) 0.9 %
10 SYRINGE (ML) INJECTION EVERY 12 HOURS SCHEDULED
Status: DISCONTINUED | OUTPATIENT
Start: 2021-06-08 | End: 2021-06-10 | Stop reason: HOSPADM

## 2021-06-08 RX ORDER — ACETAMINOPHEN 325 MG/1
650 TABLET ORAL EVERY 6 HOURS PRN
Status: DISCONTINUED | OUTPATIENT
Start: 2021-06-08 | End: 2021-06-10 | Stop reason: HOSPADM

## 2021-06-08 RX ADMIN — Medication 10 ML: at 21:21

## 2021-06-08 RX ADMIN — LEVOFLOXACIN 750 MG: 5 INJECTION, SOLUTION INTRAVENOUS at 21:20

## 2021-06-08 RX ADMIN — SODIUM CHLORIDE 1000 ML: 9 INJECTION, SOLUTION INTRAVENOUS at 03:56

## 2021-06-08 RX ADMIN — ACETAMINOPHEN 650 MG: 325 TABLET ORAL at 07:27

## 2021-06-08 RX ADMIN — LEVOFLOXACIN 750 MG: 5 INJECTION, SOLUTION INTRAVENOUS at 01:15

## 2021-06-08 RX ADMIN — ACETAMINOPHEN 650 MG: 325 TABLET ORAL at 15:16

## 2021-06-08 RX ADMIN — ONDANSETRON 4 MG: 2 INJECTION INTRAMUSCULAR; INTRAVENOUS at 21:26

## 2021-06-08 RX ADMIN — ONDANSETRON 4 MG: 2 INJECTION INTRAMUSCULAR; INTRAVENOUS at 00:54

## 2021-06-08 RX ADMIN — LOPERAMIDE HYDROCHLORIDE 2 MG: 2 CAPSULE ORAL at 23:02

## 2021-06-08 RX ADMIN — ENOXAPARIN SODIUM 40 MG: 40 INJECTION SUBCUTANEOUS at 09:36

## 2021-06-08 RX ADMIN — ACETAMINOPHEN 1000 MG: 500 TABLET ORAL at 01:13

## 2021-06-08 RX ADMIN — Medication 10 ML: at 09:36

## 2021-06-08 RX ADMIN — ACETAMINOPHEN 650 MG: 325 TABLET ORAL at 23:01

## 2021-06-08 RX ADMIN — ONDANSETRON 4 MG: 2 INJECTION INTRAMUSCULAR; INTRAVENOUS at 12:34

## 2021-06-08 ASSESSMENT — PAIN SCALES - GENERAL
PAINLEVEL_OUTOF10: 0
PAINLEVEL_OUTOF10: 5
PAINLEVEL_OUTOF10: 0
PAINLEVEL_OUTOF10: 0

## 2021-06-08 ASSESSMENT — ENCOUNTER SYMPTOMS
ABDOMINAL PAIN: 0
TROUBLE SWALLOWING: 0
VOMITING: 0
PHOTOPHOBIA: 0
CONSTIPATION: 0
DIARRHEA: 1
RHINORRHEA: 0
VOICE CHANGE: 0
NAUSEA: 0
COUGH: 1
SHORTNESS OF BREATH: 1

## 2021-06-08 NOTE — ED NOTES
Pt resting in bed at this time. Awake with family at bedside. Respirations are easy and unlabored. No apparent signs of distress noted. Bed is low and locked with siderails up x2 for pt safety. Call light within reach. Will continue to monitor.        Nestor Chavis RN  06/08/21 Rad 7750, [place]  06/08/21 0977

## 2021-06-08 NOTE — PROGRESS NOTES
sick contacts, no recent travel history. Pt is from home. Patient Covid rapid test is negative, patient endorsed that he was vaccinated x2 doses and second dose is May 17    Past Medical History:   History reviewed. No pertinent past medical history. Past Surgical History:        Procedure Laterality Date    ABDOMEN SURGERY      1DAYS OLD    ABDOMINAL ADHESION SURGERY      APPENDECTOMY  1970    CHOLECYSTECTOMY, LAPAROSCOPIC N/A 1/25/2019    LAPAROSCOPIC CHOLECYSTECTOMY performed by Power Rizo MD at [address]       Medications Prior to Admission:    Prior to Admission medications    Not on File       Allergies:  Asa [aspirin], Pcn [penicillins], and Rye grass flower pollen extract [gramineae pollens]    Social History:   TOBACCO:   reports that he has never smoked. He has never used smokeless tobacco.  ETOH:   reports no history of alcohol use. Family History:       Problem Relation Age of Onset    Breast Cancer Mother     Cancer Father     Kidney Disease Father        REVIEW OF SYSTEMS:    · Constitutional: No fever, no chills, no change in weight; good appetite  · HEENT: No blurred vision, no ear problems, no sore throat, no rhinorrhea. · Respiratory: No cough, no sputum production, no pleuritic chest pain, no shortness of breath  · Cardiology: No angina, no dyspnea on exertion, no paroxysmal nocturnal dyspnea, no orthopnea, no palpitation, no leg swelling. · Gastroenterology: No dysphagia, no reflux; no abdominal pain, no nausea or vomiting; no constipation or diarrhea.  No hematochezia   · Genitourinary: No dysuria, no frequency, hesitancy; no hematuria  · Musculoskeletal: no joint pain, no myalgia, no change in range of movement  · Neurology: no focal weakness in extremities, no slurred speech, no double vision, no tingling or numbness sensation  · Endocrinology: no temperature intolerance, no polyphagia, polydipsia or polyuria  · Hematology: no increased bleeding, no bruising, no lymphadenopathy  · Skin: no skin changes noticed by patient  · Psychology: no depressed mood, no suicidal ideation    Physical Exam   · Vitals: /85   Pulse 99   Temp 100 °F (37.8 °C)   Resp 18   Ht 6' 2\" (1.88 m)   Wt 210 lb (95.3 kg)   SpO2 95%   BMI 26.96 kg/m²     · General Appearance: alert and oriented to person, place and time, well developed and well- nourished, in no acute distress, breathing room air  · Skin: warm and dry, no rash or erythema  · Head: normocephalic and atraumatic  · Eyes: pupils equal, round, and reactive to light, extraocular eye movements intact, conjunctivae normal  · ENT: tympanic membrane, external ear and ear canal normal bilaterally, nose without deformity, nasal mucosa and turbinates normal without polyps  · Neck: supple and non-tender without mass, no thyromegaly or thyroid nodules, no cervical lymphadenopathy  · Pulmonary/Chest: coarse breath sound and ronchi of the Rt upper lung, otherwise equal air entry bilaterally, no wheezes, rales , normal air movement, no respiratory distress  · Cardiovascular: normal rate, regular rhythm, normal S1 and S2, no murmurs, rubs, clicks, or gallops, distal pulses intact, no carotid bruits  · Abdomen: soft, non-tender, non-distended, normal bowel sounds, no masses or organomegaly, midline surgical scar appreciated  · Extremities: no cyanosis, clubbing or edema  · Musculoskeletal: normal range of motion, no joint swelling, deformity or tenderness  · Neurologic: reflexes normal and symmetric, no cranial nerve deficit, gait, coordination and speech normal   Labs and Imaging Studies   Basic Labs  Recent Labs     06/07/21  1548      K 4.0      CO2 20*   BUN 18   CREATININE 1.2   GLUCOSE 138*   CALCIUM 8.9       Recent Labs     06/07/21  1548   WBC 14.8*   RBC 4.71   HGB 13.6   HCT 41.6   MCV 88.3   MCH 28.9   MCHC 32.7   RDW 13.2      MPV 11.0       CBC:   Lab Results   Component Value Date    WBC 14.8 06/07/2021    RBC 4.71 06/07/2021    HGB 13.6 06/07/2021    HCT 41.6 06/07/2021    MCV 88.3 06/07/2021    RDW 13.2 06/07/2021     06/07/2021     CMP:  Lab Results   Component Value Date     06/07/2021    K 4.0 06/07/2021     06/07/2021    CO2 20 06/07/2021    BUN 18 06/07/2021    PROT 7.6 06/07/2021       Imaging Studies:     XR CHEST (2 VW)    Result Date: 6/7/2021  EXAMINATION: TWO XRAY VIEWS OF THE CHEST 6/7/2021 4:53 pm COMPARISON: 05/05/2019 HISTORY: ORDERING SYSTEM PROVIDED HISTORY: cp TECHNOLOGIST PROVIDED HISTORY: Reason for exam:->cp What reading provider will be dictating this exam?->CRC FINDINGS: Pulmonary consolidation in the right upper lobe likely pneumonia. The left lung is clear. The cardiomediastinal contour is unremarkable. No pneumothorax or pleural effusion is seen. Right upper lobe consolidation, likely pneumonia. Radiographic follow-up is recommended. CTA CHEST W CONTRAST    Result Date: 6/8/2021  EXAMINATION: CTA OF THE CHEST 6/7/2021 11:43 pm TECHNIQUE: CTA of the chest was performed after the administration of intravenous contrast.  Multiplanar reformatted images are provided for review. MIP images are provided for review. Dose modulation, iterative reconstruction, and/or weight based adjustment of the mA/kV was utilized to reduce the radiation dose to as low as reasonably achievable. COMPARISON: 05/06/2019 HISTORY: ORDERING SYSTEM PROVIDED HISTORY: sob, TECHNOLOGIST PROVIDED HISTORY: Reason for exam:->sob, Decision Support Exception - unselect if not a suspected or confirmed emergency medical condition->Emergency Medical Condition (MA) What reading provider will be dictating this exam?->CRC FINDINGS: Pulmonary Arteries: Subsegmental pulmonary arteries suboptimally opacified and therefore inadequately evaluated. No definite filling defect in the more central pulmonary arteries. Mediastinum: No evidence of mediastinal lymphadenopathy.   The heart and pericardium demonstrate no acute abnormality. There is no acute abnormality of the thoracic aorta. Lungs/pleura: Consolidation with air bronchograms in the posterior segment of the right upper lobe, compatible with pneumonia. Lungs otherwise clear. Upper Abdomen: Status post cholecystectomy. Suggestion of diffuse hepatic steatosis. A 0.3 cm hypodensity in the dome of the liver, unchanged compared to the prior study and suggestive of a simple cyst or small hemangioma. Soft Tissues/Bones: No acute bone or soft tissue abnormality. Subsegmental pulmonary arteries suboptimally opacified and therefore inadequately evaluated. No evidence of embolism in the more central pulmonary arteries. Right upper lobe pneumonia. Follow-up chest x-ray to resolution recommended. EKG: normal sinus rhythm. Resident's Assessment and Plan       Luis Carlos Vasquez is a 60 y.o. male past surgical history of intussuseption (at 4 days old), SBO and appendicitis s/p surgery, and chronic cholecystitis s/p cholecystectomy, denies any past medical history of hypertension, hyperlipidemia, cardiovascular conditions, diabetes, presented with fevers, chills, pleuritic chest pain intermittent , and productive cough since Sunday. Assessment:  1. Fever, chills, productive cough with shortness of breath, secondary to CAP   With sudden onset   Imaging shows consolidation  2. Transaminitis, unknown etiologies   She denies history of alcohol abuse,  3.  Leukocytosis secondary to CAP          Plan:    Start levoquin 750 mg QD (allergic to penicillin, reaction as anaphylaxis, patient is uncertain about any prior history of allergy reaction to third-generation cephalosporin)   Consider to transition to p.o. in the next 24 to 40 hours given clinical course improves   Blood culture   Respiratory culture, sputum Gram stain, respiratory viral panel, strep pneumonia and Legionella urine antigen   One-time HIV screening (according to update)   Monitor daily CBC   Hepatitis panel   Trend CMP for liver enzymes          DVT ppx: lovenox  GI ppx: -      Mary Lou Franco MD, PGY-1   Attending physician: Dr. Marivel Luther

## 2021-06-08 NOTE — ED NOTES
Bed: 33  Expected date:   Expected time:   Means of arrival:   Comments:  Tequila Clarke, RN  06/08/21 [id number]

## 2021-06-08 NOTE — PROGRESS NOTES
[address] [facility]  Internal Medicine Residency / [address]    Attending Physician Statement  I have discussed the case, including pertinent history and exam findings with the resident and the team.  I have seen and examined the patient and the key elements of the encounter have been performed by me. I agree with the assessment, plan and orders as documented by the resident. Admitted with CAP symptoms and RUL infiltrate  Awaiting sputum gram stain  A&O and mobile  Levaquin to continue at this time      Remainder of medical problems as per resident note.       Shona Duane, MD [facility]  Internal Medicine Residency Faculty

## 2021-06-08 NOTE — PLAN OF CARE
Problem: Nausea/Vomiting:  Goal: Absence of nausea/vomiting  Description: Absence of nausea/vomiting  Outcome: Ongoing     Problem: Nausea/Vomiting:  Goal: Able to drink  Description: Able to drink  Outcome: Ongoing     Problem: Nausea/Vomiting:  Goal: Able to eat  Description: Able to eat  Outcome: Ongoing     Problem: Nausea/Vomiting:  Goal: Ability to achieve adequate nutritional intake will improve  Description: Ability to achieve adequate nutritional intake will improve  Outcome: Ongoing

## 2021-06-08 NOTE — H&P
[facility]  Internal Medicine Residency Program  History and Physical    Patient:  Allie Reis 60 y.o. male MRN: [id number]     Date of Service: 6/8/2021    Hospital Day: 2      Chief complaint: had concerns including Shortness of Breath (cough, cold symptoms, fever, cp since saturday, has had both vaccines (last one 5-17) ). History Obtained From:  patient    Date of Admission: 6/8/2021  History of Present Illness   Allie Reis is a 60 y.o. male past surgical history of intussuseption (at 4 days old), SBO and appendicitis s/p surgery, and chronic cholecystitis s/p cholecystectomy, denies any past medical history of hypertension, hyperlipidemia, cardiovascular conditions, diabetes, presented with fevers, chills, pleuritic chest pain intermittent , and productive cough since Sunday. Patient was in his usual health until early Sunday morning when he developed fevers, chills, diaphoresis, intermittent chest pain exacerbated during breathing,  shortness of breath which is progressively worsening since onset, and productive cough, with white sputum. He also endorsed nausea, and episodic diarrhea, with loose stool. Pt denies any urinary symptoms, including burning, frequency, or cloudy urine    In the ED, patient vital sign is stable except low-grade fever with T-max of 100. Lab wise, ALT is 62, AST is 58, white count is 14.8 with neutrophil predominant 87%. Chest x-ray shows right upper lobe consolidation, and CT of the chest shows right upper lobe pneumonia, with no evidence of central pulmonary embolism, with suboptimals study of the subsegmental pulmonary arteries. Patient was given 1 L of fluid, treated with 1 dose of Levaquin (allergic to penicillin with reaction as anaphylaxis). Blood culture 2 bottles were sent and patient transferred to floor for further management    Of note, patient denies tobacco or alcohol abuse, denies illicit drug abuse.   Patient denies any sick contacts, no recent travel history. Pt is from home. Patient Covid rapid test is negative, patient endorsed that he was vaccinated x2 doses and second dose is May 17    Past Medical History:   History reviewed. No pertinent past medical history. Past Surgical History:        Procedure Laterality Date    ABDOMEN SURGERY      1DAYS OLD    ABDOMINAL ADHESION SURGERY      APPENDECTOMY  1970    CHOLECYSTECTOMY, LAPAROSCOPIC N/A 1/25/2019    LAPAROSCOPIC CHOLECYSTECTOMY performed by Amanda Kerr MD at [address]       Medications Prior to Admission:    Prior to Admission medications    Not on File       Allergies:  Asa [aspirin], Pcn [penicillins], and Rye grass flower pollen extract [gramineae pollens]    Social History:   TOBACCO:   reports that he has never smoked. He has never used smokeless tobacco.  ETOH:   reports no history of alcohol use. Family History:       Problem Relation Age of Onset    Breast Cancer Mother     Cancer Father     Kidney Disease Father        REVIEW OF SYSTEMS:    · Constitutional: No fever, no chills, no change in weight; good appetite  · HEENT: No blurred vision, no ear problems, no sore throat, no rhinorrhea. · Respiratory: No cough, no sputum production, no pleuritic chest pain, no shortness of breath  · Cardiology: No angina, no dyspnea on exertion, no paroxysmal nocturnal dyspnea, no orthopnea, no palpitation, no leg swelling. · Gastroenterology: No dysphagia, no reflux; no abdominal pain, no nausea or vomiting; no constipation or diarrhea.  No hematochezia   · Genitourinary: No dysuria, no frequency, hesitancy; no hematuria  · Musculoskeletal: no joint pain, no myalgia, no change in range of movement  · Neurology: no focal weakness in extremities, no slurred speech, no double vision, no tingling or numbness sensation  · Endocrinology: no temperature intolerance, no polyphagia, polydipsia or polyuria  · Hematology: no increased bleeding, no bruising, no lymphadenopathy  · Skin: no skin changes noticed by patient  · Psychology: no depressed mood, no suicidal ideation    Physical Exam   · Vitals: /80   Pulse 88   Temp 98.9 °F (37.2 °C) (Oral)   Resp 18   Ht 6' 2\" (1.88 m)   Wt 210 lb (95.3 kg)   SpO2 97%   BMI 26.96 kg/m²     · General Appearance: alert and oriented to person, place and time, well developed and well- nourished, in no acute distress, breathing room air  · Skin: warm and dry, no rash or erythema  · Head: normocephalic and atraumatic  · Eyes: pupils equal, round, and reactive to light, extraocular eye movements intact, conjunctivae normal  · ENT: tympanic membrane, external ear and ear canal normal bilaterally, nose without deformity, nasal mucosa and turbinates normal without polyps  · Neck: supple and non-tender without mass, no thyromegaly or thyroid nodules, no cervical lymphadenopathy  · Pulmonary/Chest: coarse breath sound and ronchi of the Rt upper lung, otherwise equal air entry bilaterally, no wheezes, rales , normal air movement, no respiratory distress  · Cardiovascular: normal rate, regular rhythm, normal S1 and S2, no murmurs, rubs, clicks, or gallops, distal pulses intact, no carotid bruits  · Abdomen: soft, non-tender, non-distended, normal bowel sounds, no masses or organomegaly, midline surgical scar appreciated  · Extremities: no cyanosis, clubbing or edema  · Musculoskeletal: normal range of motion, no joint swelling, deformity or tenderness  · Neurologic: reflexes normal and symmetric, no cranial nerve deficit, gait, coordination and speech normal   Labs and Imaging Studies   Basic Labs  Recent Labs     06/07/21  1548      K 4.0      CO2 20*   BUN 18   CREATININE 1.2   GLUCOSE 138*   CALCIUM 8.9       Recent Labs     06/07/21  1548   WBC 14.8*   RBC 4.71   HGB 13.6   HCT 41.6   MCV 88.3   MCH 28.9   MCHC 32.7   RDW 13.2      MPV 11.0       CBC:   Lab Results   Component Value Date    WBC 14.8 06/07/2021    RBC 4.71 06/07/2021    HGB 13.6 06/07/2021    HCT 41.6 06/07/2021    MCV 88.3 06/07/2021    RDW 13.2 06/07/2021     06/07/2021     CMP:  Lab Results   Component Value Date     06/07/2021    K 4.0 06/07/2021     06/07/2021    CO2 20 06/07/2021    BUN 18 06/07/2021    PROT 7.1 06/08/2021       Imaging Studies:     XR CHEST (2 VW)    Result Date: 6/7/2021  EXAMINATION: TWO XRAY VIEWS OF THE CHEST 6/7/2021 4:53 pm COMPARISON: 05/05/2019 HISTORY: ORDERING SYSTEM PROVIDED HISTORY: cp TECHNOLOGIST PROVIDED HISTORY: Reason for exam:->cp What reading provider will be dictating this exam?->CRC FINDINGS: Pulmonary consolidation in the right upper lobe likely pneumonia. The left lung is clear. The cardiomediastinal contour is unremarkable. No pneumothorax or pleural effusion is seen. Right upper lobe consolidation, likely pneumonia. Radiographic follow-up is recommended. CTA CHEST W CONTRAST    Result Date: 6/8/2021  EXAMINATION: CTA OF THE CHEST 6/7/2021 11:43 pm TECHNIQUE: CTA of the chest was performed after the administration of intravenous contrast.  Multiplanar reformatted images are provided for review. MIP images are provided for review. Dose modulation, iterative reconstruction, and/or weight based adjustment of the mA/kV was utilized to reduce the radiation dose to as low as reasonably achievable. COMPARISON: 05/06/2019 HISTORY: ORDERING SYSTEM PROVIDED HISTORY: sob, TECHNOLOGIST PROVIDED HISTORY: Reason for exam:->sob, Decision Support Exception - unselect if not a suspected or confirmed emergency medical condition->Emergency Medical Condition (MA) What reading provider will be dictating this exam?->CRC FINDINGS: Pulmonary Arteries: Subsegmental pulmonary arteries suboptimally opacified and therefore inadequately evaluated. No definite filling defect in the more central pulmonary arteries. Mediastinum: No evidence of mediastinal lymphadenopathy.   The heart and pericardium demonstrate no acute abnormality. There is no acute abnormality of the thoracic aorta. Lungs/pleura: Consolidation with air bronchograms in the posterior segment of the right upper lobe, compatible with pneumonia. Lungs otherwise clear. Upper Abdomen: Status post cholecystectomy. Suggestion of diffuse hepatic steatosis. A 0.3 cm hypodensity in the dome of the liver, unchanged compared to the prior study and suggestive of a simple cyst or small hemangioma. Soft Tissues/Bones: No acute bone or soft tissue abnormality. Subsegmental pulmonary arteries suboptimally opacified and therefore inadequately evaluated. No evidence of embolism in the more central pulmonary arteries. Right upper lobe pneumonia. Follow-up chest x-ray to resolution recommended. EKG: normal sinus rhythm. Resident's Assessment and Plan       Rick Colindres is a 60 y.o. male past surgical history of intussuseption (at 4 days old), SBO and appendicitis s/p surgery, and chronic cholecystitis s/p cholecystectomy, denies any past medical history of hypertension, hyperlipidemia, cardiovascular conditions, diabetes, presented with fevers, chills, pleuritic chest pain intermittent , and productive cough since Sunday. Assessment:  1. Fever, chills, productive cough with shortness of breath, secondary to CAP   With sudden onset   Imaging shows consolidation  2. Transaminitis, unknown etiologies   She denies history of alcohol abuse,  3.  Leukocytosis secondary to CAP          Plan:    Start levoquin 750 mg QD (allergic to penicillin, reaction as anaphylaxis, patient is uncertain about any prior history of allergy reaction to third-generation cephalosporin)   Consider to transition to p.o. in the next 24 to 40 hours given clinical course improves   Blood culture   Respiratory culture, sputum Gram stain, respiratory viral panel, strep pneumonia and Legionella urine antigen   One-time HIV screening (according to

## 2021-06-08 NOTE — ED NOTES
Report faxed to floor  Report received from jhonatan reyes   Pt updated with room number  Floor notified that report was faxed.       Breann Sanford RN  06/08/21 [id number]

## 2021-06-08 NOTE — ED NOTES
Blood cultures obtained from right antecubital, per policy. Set one of two drawn at this time.                Nestor Chavis RN  06/08/21 [id number]

## 2021-06-08 NOTE — ED PROVIDER NOTES
Patient is a 66-year-old male with no reported past medical history who presents to the emergency department with a complaint of flulike symptoms since Saturday. Patient says he woke up approximately 0200 hrs. on Saturday night with fevers and chills, cough, body aches. Cough has been productive with yellow phlegm. Patient has not self treated with over-the-counter medications. Patient states he has not been vaccinated against Covid. Patient denies chest pain, endorses mild shortness of breath. Patient denies nausea or vomiting however endorses decreased appetite and decreased intake of food and fluids. Patient endorses episodes of diarrhea, no blood. Patient denies any blurred vision or double vision, dizziness. Patient denies any recent sick contacts or recent illness. Patient has not been on antibiotics recently. Review of Systems   Constitutional: Positive for appetite change, chills, fatigue and fever. HENT: Negative for congestion, rhinorrhea, trouble swallowing and voice change. Eyes: Negative for photophobia and visual disturbance. Respiratory: Positive for cough and shortness of breath. Cardiovascular: Negative for chest pain, palpitations and leg swelling. Gastrointestinal: Positive for diarrhea. Negative for abdominal pain, constipation, nausea and vomiting. Genitourinary: Negative for dysuria, frequency, hematuria and urgency. Musculoskeletal: Negative for arthralgias and myalgias. Skin: Negative for rash and wound. Neurological: Negative for dizziness, syncope, weakness, light-headedness, numbness and headaches. Psychiatric/Behavioral: Negative for confusion. The patient is not nervous/anxious. Physical Exam  Vitals and nursing note reviewed. Constitutional:       General: He is awake. He is not in acute distress. Appearance: He is normal weight. He is ill-appearing. He is not toxic-appearing. HENT:      Head: Normocephalic and atraumatic. Mouth/Throat:      Mouth: Mucous membranes are moist.      Pharynx: Oropharynx is clear. Eyes:      Extraocular Movements: Extraocular movements intact. Pupils: Pupils are equal, round, and reactive to light. Cardiovascular:      Rate and Rhythm: Normal rate and regular rhythm. Pulses: Normal pulses. Heart sounds: Normal heart sounds. Pulmonary:      Effort: Pulmonary effort is normal. No prolonged expiration. Breath sounds: Normal breath sounds. No decreased breath sounds, wheezing, rhonchi or rales. Abdominal:      General: Abdomen is flat. There is no distension. Palpations: Abdomen is soft. Tenderness: There is no abdominal tenderness. Musculoskeletal:         General: Normal range of motion. Cervical back: Normal range of motion and neck supple. Skin:     General: Skin is warm and dry. Capillary Refill: Capillary refill takes less than 2 seconds. Neurological:      General: No focal deficit present. Mental Status: He is alert and oriented to person, place, and time. GCS: GCS eye subscore is 4. GCS verbal subscore is 5. GCS motor subscore is 6. Cranial Nerves: Cranial nerves are intact. Sensory: Sensation is intact. Motor: Motor function is intact. Psychiatric:         Behavior: Behavior is cooperative. =MDM  Number of Diagnoses or Management Options  Pneumonia of right upper lobe due to infectious organism  Diagnosis management comments: Patient is a 78-year-old male presents to the emergency department with a complaint of flulike symptoms including fevers and chills, decreased appetite, productive cough since Saturday night. Patient has not measured his temperature, subjective fevers only. Patient has not treated it with OTC. Patient is not been on antibiotics recently. Patient presents awake, alert, ill-appearing. Vital signs showing no tachycardia and initially afebrile.   Physical exam shows an ill-appearing male, lungs clear and equal bilaterally, tachycardia. Work-up including labs shows leukocytosis of 15, no anemia. Electrolytes all within normal limits. Covid is negative. Urinalysis is consistent with dehydration. D-dimer is elevated. Chest x-ray shows a right upper lobe pneumonia, CTA shows no PE, does verify a pneumonia. EKG shows sinus mechanism without ectopy. Patient reevaluation showed no tachycardia however patient did have a fever of 102 degrees. Patient was treated with Tylenol and given IV fluids. Patient was treated with IV antibiotic Levaquin due to allergy. Hospitalist was consulted and patient was discussed, accepted to their service. Pending call from internal medicine resident. Patient was admitted to their service. Amount and/or Complexity of Data Reviewed  Clinical lab tests: ordered and reviewed  Tests in the radiology section of CPT®: ordered and reviewed         ED Course as of Jun 08 0237 Tue Jun 08, 2021   [address] [facility] Spoke with Dr. [employer] and patient was accepted to  service. [QC]      ED Course User Index  [QC] Tyler Diego MD      ED Course as of Jun 08 0238 Tue Jun 08, 2021   0050 Spoke with Dr. [employer] and patient was accepted to  service. [QC]      ED Course User Index  [QC] Tyler Diego MD       --------------------------------------------- PAST HISTORY ---------------------------------------------  Past Medical History:  has no past medical history on file. Past Surgical History:  has a past surgical history that includes Abdomen surgery; Appendectomy (1970); Abdominal adhesion surgery; and Cholecystectomy, laparoscopic (N/A, 1/25/2019). Social History:  reports that he has never smoked. He has never used smokeless tobacco. He reports that he does not drink alcohol and does not use drugs. Family History: family history includes Breast Cancer in his mother; Cancer in his father; Kidney Disease in his father.      The patients home medications have U/L    AST 58 (H) 0 - 39 U/L   D-Dimer, Quantitative   Result Value Ref Range    D-Dimer, Quant 525 ng/mL DDU   Brain Natriuretic Peptide   Result Value Ref Range    Pro- (H) 0 - 125 pg/mL   Magnesium   Result Value Ref Range    Magnesium 2.1 1.6 - 2.6 mg/dL   Urinalysis   Result Value Ref Range    Color, UA Yellow Straw/Yellow    Clarity, UA Clear Clear    Glucose, Ur Negative Negative mg/dL    Bilirubin Urine MODERATE (A) Negative    Ketones, Urine 40 (A) Negative mg/dL    Specific Gravity, UA >=1.030 1.005 - 1.030    Blood, Urine SMALL (A) Negative    pH, UA 5.5 5.0 - 9.0    Protein,  (A) Negative mg/dL    Urobilinogen, Urine 4.0 (A) <2.0 E.U./dL    Nitrite, Urine Negative Negative    Leukocyte Esterase, Urine Negative Negative   Microscopic Urinalysis   Result Value Ref Range    Coarse Casts, UA 0-2 0 - 2 /LPF    WBC, UA 1-3 0 - 5 /HPF    RBC, UA 2-5 0 - 2 /HPF    Bacteria, UA MANY (A) None Seen /HPF    Amorphous, UA MODERATE    EKG 12 Lead   Result Value Ref Range    Ventricular Rate 91 BPM    Atrial Rate 91 BPM    P-R Interval 148 ms    QRS Duration 94 ms    Q-T Interval 332 ms    QTc Calculation (Bazett) 408 ms    P Axis 43 degrees    R Axis 20 degrees       RADIOLOGY:  CTA CHEST W CONTRAST   Final Result   Subsegmental pulmonary arteries suboptimally opacified and therefore   inadequately evaluated. No evidence of embolism in the more central pulmonary arteries. Right upper lobe pneumonia. Follow-up chest x-ray to resolution recommended. XR CHEST (2 VW)   Final Result   Right upper lobe consolidation, likely pneumonia. Radiographic follow-up is   recommended. EKG: This EKG is signed and interpreted by me.     Rate: 91  Rhythm: Sinus  Interpretation: no acute changes  Comparison: stable as compared to patient's most recent EKG      ------------------------- NURSING NOTES AND VITALS REVIEWED ---------------------------  Date / Time Roomed:  6/7/2021 10:21 PM  ED Bed Assignment:  10/10    The nursing notes within the ED encounter and vital signs as below have been reviewed. Patient Vitals for the past 24 hrs:   BP Temp Pulse Resp SpO2 Height Weight   06/08/21 0100 120/85 100 °F (37.8 °C) 99 18 95 % -- --   06/07/21 1531 113/87 -- 104 20 96 % 6' 2\" (1.88 m) 210 lb (95.3 kg)   06/07/21 1502 -- 98 °F (36.7 °C) 98 -- 93 % -- --       Oxygen Saturation Interpretation: Normal    ------------------------------------------ PROGRESS NOTES ------------------------------------------  Re-evaluation(s):  Patients symptoms show no change  Repeat physical examination is not changed    Counseling:  I have spoken with the patient and discussed todays results, in addition to providing specific details for the plan of care and counseling regarding the diagnosis and prognosis. Their questions are answered at this time and they are agreeable with the plan of admission.    --------------------------------- ADDITIONAL PROVIDER NOTES ---------------------------------  Consultations:  Spoke with Dr. Wilmer Livingston. Discussed case. They will admit the patient. This patient's ED course included: a personal history and physicial examination, multiple bedside re-evaluations, IV medications, cardiac monitoring and continuous pulse oximetry    This patient has remained hemodynamically stable during their ED course. Diagnosis:  1. Pneumonia of right upper lobe due to infectious organism      Disposition:  Patient's disposition: Admit to telemetry  Patient's condition is stable. 6/8/21, 2:38 AM EDT.     This note is prepared by Gary Gilbert MD -PGY- 2           Gary Gilbert MD  Resident  06/08/21 8404

## 2021-06-09 LAB
ALBUMIN SERPL-MCNC: 3.7 G/DL (ref 3.5–5.2)
ALP BLD-CCNC: 94 U/L (ref 40–129)
ALT SERPL-CCNC: 46 U/L (ref 0–40)
ANION GAP SERPL CALCULATED.3IONS-SCNC: 13 MMOL/L (ref 7–16)
AST SERPL-CCNC: 30 U/L (ref 0–39)
BASOPHILS ABSOLUTE: 0.02 E9/L (ref 0–0.2)
BASOPHILS RELATIVE PERCENT: 0.2 % (ref 0–2)
BILIRUB SERPL-MCNC: 0.3 MG/DL (ref 0–1.2)
BUN BLDV-MCNC: 24 MG/DL (ref 6–20)
CALCIUM SERPL-MCNC: 8.8 MG/DL (ref 8.6–10.2)
CHLORIDE BLD-SCNC: 107 MMOL/L (ref 98–107)
CO2: 21 MMOL/L (ref 22–29)
CREAT SERPL-MCNC: 1 MG/DL (ref 0.7–1.2)
EOSINOPHILS ABSOLUTE: 0.01 E9/L (ref 0.05–0.5)
EOSINOPHILS RELATIVE PERCENT: 0.1 % (ref 0–6)
GFR AFRICAN AMERICAN: >60
GFR NON-AFRICAN AMERICAN: >60 ML/MIN/1.73
GLUCOSE BLD-MCNC: 101 MG/DL (ref 74–99)
HCT VFR BLD CALC: 44.5 % (ref 37–54)
HEMOGLOBIN: 14 G/DL (ref 12.5–16.5)
IMMATURE GRANULOCYTES #: 0.03 E9/L
IMMATURE GRANULOCYTES %: 0.3 % (ref 0–5)
LYMPHOCYTES ABSOLUTE: 0.76 E9/L (ref 1.5–4)
LYMPHOCYTES RELATIVE PERCENT: 7.9 % (ref 20–42)
MCH RBC QN AUTO: 28.9 PG (ref 26–35)
MCHC RBC AUTO-ENTMCNC: 31.5 % (ref 32–34.5)
MCV RBC AUTO: 91.9 FL (ref 80–99.9)
MONOCYTES ABSOLUTE: 0.8 E9/L (ref 0.1–0.95)
MONOCYTES RELATIVE PERCENT: 8.4 % (ref 2–12)
NEUTROPHILS ABSOLUTE: 7.95 E9/L (ref 1.8–7.3)
NEUTROPHILS RELATIVE PERCENT: 83.1 % (ref 43–80)
PDW BLD-RTO: 14.2 FL (ref 11.5–15)
PLATELET # BLD: 200 E9/L (ref 130–450)
PMV BLD AUTO: 11.6 FL (ref 7–12)
POTASSIUM SERPL-SCNC: 3.8 MMOL/L (ref 3.5–5)
RBC # BLD: 4.84 E12/L (ref 3.8–5.8)
SODIUM BLD-SCNC: 141 MMOL/L (ref 132–146)
TOTAL PROTEIN: 7.5 G/DL (ref 6.4–8.3)
WBC # BLD: 9.6 E9/L (ref 4.5–11.5)

## 2021-06-09 PROCEDURE — 2580000003 HC RX 258: Performed by: INTERNAL MEDICINE

## 2021-06-09 PROCEDURE — 6370000000 HC RX 637 (ALT 250 FOR IP): Performed by: INTERNAL MEDICINE

## 2021-06-09 PROCEDURE — 80053 COMPREHEN METABOLIC PANEL: CPT

## 2021-06-09 PROCEDURE — 1200000000 HC SEMI PRIVATE

## 2021-06-09 PROCEDURE — 85025 COMPLETE CBC W/AUTO DIFF WBC: CPT

## 2021-06-09 PROCEDURE — 36415 COLL VENOUS BLD VENIPUNCTURE: CPT

## 2021-06-09 PROCEDURE — 6360000002 HC RX W HCPCS: Performed by: INTERNAL MEDICINE

## 2021-06-09 PROCEDURE — 99231 SBSQ HOSP IP/OBS SF/LOW 25: CPT | Performed by: INTERNAL MEDICINE

## 2021-06-09 RX ADMIN — Medication 10 ML: at 21:14

## 2021-06-09 RX ADMIN — LOPERAMIDE HYDROCHLORIDE 2 MG: 2 CAPSULE ORAL at 16:53

## 2021-06-09 RX ADMIN — ACETAMINOPHEN 650 MG: 325 TABLET ORAL at 21:14

## 2021-06-09 RX ADMIN — ENOXAPARIN SODIUM 40 MG: 40 INJECTION SUBCUTANEOUS at 08:21

## 2021-06-09 RX ADMIN — LEVOFLOXACIN 750 MG: 5 INJECTION, SOLUTION INTRAVENOUS at 21:14

## 2021-06-09 RX ADMIN — Medication 10 ML: at 08:21

## 2021-06-09 ASSESSMENT — PAIN SCALES - GENERAL
PAINLEVEL_OUTOF10: 0

## 2021-06-09 NOTE — PROGRESS NOTES
[facility]  Internal Medicine Residency Program  Progress Note  - House Team 1    Patient:  Sarah Gregory 60 y.o. male MRN: [id number]     Date of Service: 6/9/2021     CC: shortness of breath and pleuritic intermittent chest pain  Overnight events: diarrhea -> given loperamide    Subjective   Patient seen and evaluated at bedside. Patient is endorsing shortness of breath, cough productive of clear sputum, pleuritic chest pain, fevers, chills, and diarrhea. The patient states that his fever and chills along with his headache improved with tylenol. Diarrhea is chronic for this patient s/p surgery to correct intussusuption. He usually has 2-3 bowel movements. It is a bit more liquid than normal. Patient denies nausea, vomiting, headache, dizziness, changes in vision, palpitations, abdominal pain, and leg swelling. Objective     Physical Exam:  · Vitals: /84   Pulse 79   Temp 99.2 °F (37.3 °C) (Oral)   Resp 18   Ht 6' 2\" (1.88 m)   Wt 210 lb (95.3 kg)   SpO2 95%   BMI 26.96 kg/m²     · I & O - 24hr: No intake/output data recorded. · General Appearance: alert, appears stated age and cooperative  · HEENT:  Head: Normocephalic, no lesions, without obvious abnormality. · Neck: no JVD and supple, symmetrical, trachea midline  · Lung: clear to auscultation bilaterally and coarse breath sounds with ronchi of the right upper lung, no wheezing or rales, normal air movement bilaterally  · Heart: regular rate and rhythm, S1, S2 normal, no murmur, click, rub or gallop  · Abdomen: soft, non-tender; bowel sounds normal; no masses,  no organomegaly  · Extremities:  extremities normal, atraumatic, no cyanosis or edema  · Musculokeletal: No joint swelling, no muscle tenderness. ROM normal in all joints of extremities.    · Neurologic: Mental status: Alert, oriented, thought content appropriate    Pertinent Labs & Imaging Studies     CBC:   Lab Results   Component Value Date    WBC 14.8 06/07/2021    RBC 4.71 06/07/2021    HGB 13.6 06/07/2021    HCT 41.6 06/07/2021    MCV 88.3 06/07/2021    MCH 28.9 06/07/2021    MCHC 32.7 06/07/2021    RDW 13.2 06/07/2021     06/07/2021    MPV 11.0 06/07/2021     CMP:    Lab Results   Component Value Date     06/09/2021    K 3.8 06/09/2021     06/09/2021    CO2 21 06/09/2021    BUN 24 06/09/2021    CREATININE 1.0 06/09/2021    GFRAA >60 06/09/2021    LABGLOM >60 06/09/2021    GLUCOSE 101 06/09/2021    PROT 7.5 06/09/2021    LABALBU 3.7 06/09/2021    CALCIUM 8.8 06/09/2021    BILITOT 0.3 06/09/2021    ALKPHOS 94 06/09/2021    AST 30 06/09/2021    ALT 46 06/09/2021     U/A:    Lab Results   Component Value Date    COLORU Yellow 06/07/2021    PROTEINU 100 06/07/2021    PHUR 5.5 06/07/2021    WBCUA 1-3 06/07/2021    RBCUA 2-5 06/07/2021    RBCUA NONE 09/13/2013    MUCUS Present 10/22/2018    BACTERIA MANY 06/07/2021    CLARITYU Clear 06/07/2021    SPECGRAV >=1.030 06/07/2021    LEUKOCYTESUR Negative 06/07/2021    UROBILINOGEN 4.0 06/07/2021    BILIRUBINUR MODERATE 06/07/2021    BLOODU SMALL 06/07/2021    GLUCOSEU Negative 06/07/2021    AMORPHOUS MODERATE 06/07/2021       Student's Assessment and Plan     [address] is a 60 y.o. male with a past medical history of intussuseption at 1days old and past surgical history of appendectomy, cholecystectomy who presented to the ED with shortness of breath and pleuritic chest pain. 1. Pneumonia   - CT and CXR are consistent with a RUL pneumonia   - febrile up to 102.5 deg F   - WBC elevated to 14.8, procalcitonin 1.70   - respiratory panel is negative   - IV levofloxacin   - f/u sputum gram stain, respiratory culture, strep pneumonia and legionella urine antigen   - monitor CBC daily   - f/u blood culture    2. Transaminitis of unknown etiology   - hepatitis panel ordered   - trend CMP for liver enzmes    3.  Chest Pain   - troponin and EKG within normal limits   - pro-BNP is 211   - family hx of sudden cardiac death secondary to dilated cardiomyopathy   - echo pending    DVT prophylaxis: enoxaparin    Ozzy Colbert  MS3  Attending physician: Dr. Margarett Schilder    Electronically signed by Ozzy Colbert on 6/9/2021 at 8:54 AM

## 2021-06-09 NOTE — PROGRESS NOTES
[facility]  Internal Medicine Residency Program  Progress Note - House Team 1    Patient:  Josse Simon 60 y.o. male MRN: [id number]     Date of Service: 6/9/2021     CC: Patient seen and examined this   Overnight events: None      Subjective     Patient seen and examined at bedside this morning. He is still havingchest pain intermittently sometimes at rest at times when he coughs. He also had some diarrhea last night but endorses that this is normal for him. Objective     Physical Exam:  · Vitals: /84   Pulse 79   Temp 99.2 °F (37.3 °C) (Oral)   Resp 18   Ht 6' 2\" (1.88 m)   Wt 210 lb (95.3 kg)   SpO2 95%   BMI 26.96 kg/m²     · I & O - 24hr: No intake/output data recorded. · General Appearance: alert, appears stated age and cooperative  · HEENT:  Head: Normal, normocephalic, atraumatic. · Neck: no adenopathy, no carotid bruit, no JVD, supple, symmetrical, trachea midline and thyroid not enlarged, symmetric, no tenderness/mass/nodules  · Lung: clear to auscultation bilaterally and pectus excavatum   · Heart: regular rate and rhythm, S1, S2 normal, no murmur, click, rub or gallop  · Abdomen: soft, non-tender; bowel sounds normal; no masses,  no organomegaly  · Extremities:  extremities normal, atraumatic, no cyanosis or edema  · Musculokeletal: No joint swelling, no muscle tenderness. ROM normal in all joints of extremities.    · Neurologic: Mental status: Alert, oriented, thought content appropriate  Subject  Pertinent Labs & Imaging Studies   yazan  CBC:   Lab Results   Component Value Date    WBC 9.6 06/09/2021    RBC 4.84 06/09/2021    HGB 14.0 06/09/2021    HCT 44.5 06/09/2021    MCV 91.9 06/09/2021    MCH 28.9 06/09/2021    MCHC 31.5 06/09/2021    RDW 14.2 06/09/2021     06/09/2021    MPV 11.6 06/09/2021     CMP:    Lab Results   Component Value Date     06/09/2021    K 3.8 06/09/2021     06/09/2021    CO2 21 06/09/2021    BUN 24 06/09/2021

## 2021-06-09 NOTE — PLAN OF CARE
Problem: Nausea/Vomiting:  Goal: Able to drink  Description: Able to drink  6/9/2021 0925 by Yue Cook RN  Outcome: Met This Shift     Problem: Breathing Pattern - Ineffective:  Goal: Ability to achieve and maintain a regular respiratory rate will improve  Description: Ability to achieve and maintain a regular respiratory rate will improve  Outcome: Met This Shift

## 2021-06-09 NOTE — CARE COORDINATION
Spoke with Pt about Transition Plan of Care. Pt lives with Domestic Partner Whitney Etienne with 4 steps to porch and 15 steps in 2 story home. Pt can see his home outside his hospital window. Pt said that he can walk hoe or call a friend. Pt can get whatever equipment e may need from partner d/t to it being at his home. PCP: Clinic. Pharmacy: [facility]. Pt spoke with Public Benefits June 8th and is working with them for Medicaid. Discharge Plan is to return home with no needs at this time. SW/HALEY to follow for discharge needs.    Lidia Bean, L.S.W.  [phone]

## 2021-06-09 NOTE — PROGRESS NOTES
[address] [facility]  Internal Medicine Residency / [address]    Attending Physician Statement  I have discussed the case, including pertinent history and exam findings with the resident and the team.  I have seen and examined the patient and the key elements of the encounter have been performed by me. I agree with the assessment, plan and orders as documented by the resident. A&O  Temp 102 last night  Pulse Ox normal  Sputum looks purulent and copious   Not sterling or blood streaking   Chest pain appears to be from his pneumonia  Has a brother that dropped dead at a young age  [de-identified] had \"Heart enlargement\"  Pectus excavatum noted  Diarrhea is chronic and controlled with Imodium at home  Impression;CAP  Plan; Levaquin 750 mg a day  Remainder of medical problems as per resident note.       Mati Coley MD [facility] SPECIALTY [facility]  Internal Medicine Residency Faculty

## 2021-06-10 VITALS
BODY MASS INDEX: 26.95 KG/M2 | OXYGEN SATURATION: 95 % | RESPIRATION RATE: 16 BRPM | TEMPERATURE: 98.6 F | SYSTOLIC BLOOD PRESSURE: 126 MMHG | HEART RATE: 77 BPM | WEIGHT: 210 LBS | DIASTOLIC BLOOD PRESSURE: 83 MMHG | HEIGHT: 74 IN

## 2021-06-10 LAB
ALBUMIN SERPL-MCNC: 3.6 G/DL (ref 3.5–5.2)
ALP BLD-CCNC: 98 U/L (ref 40–129)
ALT SERPL-CCNC: 49 U/L (ref 0–40)
ANION GAP SERPL CALCULATED.3IONS-SCNC: 11 MMOL/L (ref 7–16)
AST SERPL-CCNC: 36 U/L (ref 0–39)
BASOPHILS ABSOLUTE: 0.02 E9/L (ref 0–0.2)
BASOPHILS RELATIVE PERCENT: 0.3 % (ref 0–2)
BILIRUB SERPL-MCNC: 0.3 MG/DL (ref 0–1.2)
BUN BLDV-MCNC: 23 MG/DL (ref 6–20)
CALCIUM SERPL-MCNC: 8.6 MG/DL (ref 8.6–10.2)
CHLORIDE BLD-SCNC: 104 MMOL/L (ref 98–107)
CO2: 25 MMOL/L (ref 22–29)
CREAT SERPL-MCNC: 1 MG/DL (ref 0.7–1.2)
EOSINOPHILS ABSOLUTE: 0.11 E9/L (ref 0.05–0.5)
EOSINOPHILS RELATIVE PERCENT: 1.7 % (ref 0–6)
GFR AFRICAN AMERICAN: >60
GFR NON-AFRICAN AMERICAN: >60 ML/MIN/1.73
GLUCOSE BLD-MCNC: 104 MG/DL (ref 74–99)
HCT VFR BLD CALC: 41.7 % (ref 37–54)
HEMOGLOBIN: 13.6 G/DL (ref 12.5–16.5)
IMMATURE GRANULOCYTES #: 0.03 E9/L
IMMATURE GRANULOCYTES %: 0.5 % (ref 0–5)
LV EF: 55 %
LVEF MODALITY: NORMAL
LYMPHOCYTES ABSOLUTE: 1.67 E9/L (ref 1.5–4)
LYMPHOCYTES RELATIVE PERCENT: 25.9 % (ref 20–42)
MCH RBC QN AUTO: 29 PG (ref 26–35)
MCHC RBC AUTO-ENTMCNC: 32.6 % (ref 32–34.5)
MCV RBC AUTO: 88.9 FL (ref 80–99.9)
MONOCYTES ABSOLUTE: 0.77 E9/L (ref 0.1–0.95)
MONOCYTES RELATIVE PERCENT: 11.9 % (ref 2–12)
NEUTROPHILS ABSOLUTE: 3.85 E9/L (ref 1.8–7.3)
NEUTROPHILS RELATIVE PERCENT: 59.7 % (ref 43–80)
PDW BLD-RTO: 13.9 FL (ref 11.5–15)
PLATELET # BLD: 224 E9/L (ref 130–450)
PMV BLD AUTO: 11.1 FL (ref 7–12)
POTASSIUM SERPL-SCNC: 3.9 MMOL/L (ref 3.5–5)
PROCALCITONIN: 0.86 NG/ML (ref 0–0.08)
RBC # BLD: 4.69 E12/L (ref 3.8–5.8)
SODIUM BLD-SCNC: 140 MMOL/L (ref 132–146)
TOTAL PROTEIN: 7.4 G/DL (ref 6.4–8.3)
WBC # BLD: 6.5 E9/L (ref 4.5–11.5)

## 2021-06-10 PROCEDURE — 84145 PROCALCITONIN (PCT): CPT

## 2021-06-10 PROCEDURE — 85025 COMPLETE CBC W/AUTO DIFF WBC: CPT

## 2021-06-10 PROCEDURE — 93306 TTE W/DOPPLER COMPLETE: CPT

## 2021-06-10 PROCEDURE — 6360000002 HC RX W HCPCS: Performed by: INTERNAL MEDICINE

## 2021-06-10 PROCEDURE — 36415 COLL VENOUS BLD VENIPUNCTURE: CPT

## 2021-06-10 PROCEDURE — 80074 ACUTE HEPATITIS PANEL: CPT

## 2021-06-10 PROCEDURE — 80053 COMPREHEN METABOLIC PANEL: CPT

## 2021-06-10 PROCEDURE — 2580000003 HC RX 258: Performed by: INTERNAL MEDICINE

## 2021-06-10 PROCEDURE — 99231 SBSQ HOSP IP/OBS SF/LOW 25: CPT | Performed by: INTERNAL MEDICINE

## 2021-06-10 RX ORDER — LEVOFLOXACIN 750 MG/1
750 TABLET ORAL DAILY
Status: DISCONTINUED | OUTPATIENT
Start: 2021-06-10 | End: 2021-06-10 | Stop reason: HOSPADM

## 2021-06-10 RX ORDER — LEVOFLOXACIN 750 MG/1
750 TABLET ORAL DAILY
Qty: 4 TABLET | Refills: 0 | Status: SHIPPED | OUTPATIENT
Start: 2021-06-10 | End: 2021-06-14

## 2021-06-10 RX ORDER — LOPERAMIDE HYDROCHLORIDE 2 MG/1
2 CAPSULE ORAL 4 TIMES DAILY PRN
Qty: 10 CAPSULE | Refills: 0 | Status: SHIPPED | OUTPATIENT
Start: 2021-06-10 | End: 2021-06-20

## 2021-06-10 RX ADMIN — ENOXAPARIN SODIUM 40 MG: 40 INJECTION SUBCUTANEOUS at 08:34

## 2021-06-10 RX ADMIN — Medication 10 ML: at 08:35

## 2021-06-10 ASSESSMENT — PAIN SCALES - GENERAL
PAINLEVEL_OUTOF10: 0

## 2021-06-10 NOTE — PLAN OF CARE
Problem: Nausea/Vomiting:  Goal: Absence of nausea/vomiting  Description: Absence of nausea/vomiting  6/10/2021 1744 by Carmen Weston RN  Outcome: Met This Shift  6/10/2021 1057 by Carmen Weston RN  Outcome: Met This Shift  Goal: Able to drink  Description: Able to drink  6/10/2021 1744 by Carmen Weston RN  Outcome: Met This Shift  6/10/2021 1057 by Carmen Weston RN  Outcome: Met This Shift  Goal: Able to eat  Description: Able to eat  6/10/2021 1744 by Carmen Weston RN  Outcome: Met This Shift  6/10/2021 1057 by Carmen Weston RN  Outcome: Met This Shift  Goal: Ability to achieve adequate nutritional intake will improve  Description: Ability to achieve adequate nutritional intake will improve  Outcome: Met This Shift     Problem: Breathing Pattern - Ineffective:  Goal: Ability to achieve and maintain a regular respiratory rate will improve  Description: Ability to achieve and maintain a regular respiratory rate will improve  6/10/2021 1744 by Carmen Weston RN  Outcome: Met This Shift  6/10/2021 1057 by Carmen Weston RN  Outcome: Met This Shift  6/10/2021 0914 by Nisa Savage RN  Outcome: Met This Shift     Problem: Discharge Planning:  Goal: Discharged to appropriate level of care  Description: Discharged to appropriate level of care  6/10/2021 1744 by Carmen Weston RN  Outcome: Met This Shift  6/10/2021 1057 by Carmen Weston RN  Outcome: Met This Shift  Goal: Participates in care planning  Description: Participates in care planning  6/10/2021 1744 by Carmen Weston RN  Outcome: Met This Shift  6/10/2021 1057 by Carmen Weston RN  Outcome: Met This Shift     Problem: Airway Clearance - Ineffective:  Goal: Clear lung sounds  Description: Clear lung sounds  Outcome: Met This Shift  Goal: Ability to maintain a clear airway will improve  Description: Ability to maintain a clear airway will improve  Outcome: Met This Shift     Problem: Fluid Volume - Deficit:  Goal: Achieves intake and output within specified parameters  Description: Achieves intake and output within specified parameters  Outcome: Met This Shift     Problem: Gas Exchange - Impaired:  Goal: Levels of oxygenation will improve  Description: Levels of oxygenation will improve  6/10/2021 1744 by Sunshine Pino RN  Outcome: Met This Shift  6/10/2021 0914 by Guillermo Keene RN  Outcome: Met This Shift     Problem: Hyperthermia:  Goal: Ability to maintain a body temperature in the normal range will improve  Description: Ability to maintain a body temperature in the normal range will improve  Outcome: Met This Shift     Problem: Tobacco Use:  Goal: Will participate in inpatient tobacco-use cessation counseling  Description: Will participate in inpatient tobacco-use cessation counseling  Outcome: Met This Shift

## 2021-06-10 NOTE — PROGRESS NOTES
[facility]  Internal Medicine Residency Program  Progress Note  - House Team 1    Patient:  Papito Zapien 60 y.o. male MRN: [id number]     Date of Service: 6/10/2021     CC: shortness of breath and pleuritic intermittent chest pain  Overnight events: no acute events overnight    Subjective   Patient seen and evaluated at bedside. Patient states that shortness of breath, pleuritic chest pain, and fevers/chills have resolved. The diarrhea is ongoing, per chronic condition. Endorses a mild dry cough. His headache improved with tylenol. Patient denies nausea, vomiting, shortness of breath, chest pain, headache, dizziness, changes in vision, palpitations, abdominal pain, and leg swelling. Objective     Physical Exam:  · Vitals: /87   Pulse 75   Temp 98.4 °F (36.9 °C)   Resp 16   Ht 6' 2\" (1.88 m)   Wt 210 lb (95.3 kg)   SpO2 95%   BMI 26.96 kg/m²     · I & O - 24hr: No intake/output data recorded. · General Appearance: alert, appears stated age and cooperative  · HEENT:  Head: Normocephalic, no lesions, without obvious abnormality. · Neck: no JVD and supple, symmetrical, trachea midline  · Lung: clear to auscultation bilaterally and coarse breath sounds with ronchi of the right upper lung, no wheezing or rales, normal air movement bilaterally  · Heart: regular rate and rhythm, S1, S2 normal, no murmur, click, rub or gallop  · Abdomen: soft, non-tender; bowel sounds normal; no masses,  no organomegaly  · Extremities:  extremities normal, atraumatic, no cyanosis or edema  · Musculokeletal: No joint swelling, no muscle tenderness. ROM normal in all joints of extremities.    · Neurologic: Mental status: Alert, oriented, thought content appropriate    Pertinent Labs & Imaging Studies     CBC:   Lab Results   Component Value Date    WBC 6.5 06/10/2021    RBC 4.69 06/10/2021    HGB 13.6 06/10/2021    HCT 41.7 06/10/2021    MCV 88.9 06/10/2021    MCH 29.0 06/10/2021    MCHC 32.6 06/10/2021    RDW 13.9 06/10/2021     06/10/2021    MPV 11.1 06/10/2021     CMP:    Lab Results   Component Value Date     06/10/2021    K 3.9 06/10/2021     06/10/2021    CO2 25 06/10/2021    BUN 23 06/10/2021    CREATININE 1.0 06/10/2021    GFRAA >60 06/10/2021    LABGLOM >60 06/10/2021    GLUCOSE 104 06/10/2021    PROT 7.4 06/10/2021    LABALBU 3.6 06/10/2021    CALCIUM 8.6 06/10/2021    BILITOT 0.3 06/10/2021    ALKPHOS 98 06/10/2021    AST 36 06/10/2021    ALT 49 06/10/2021     U/A:    Lab Results   Component Value Date    COLORU Yellow 06/07/2021    PROTEINU 100 06/07/2021    PHUR 5.5 06/07/2021    WBCUA 1-3 06/07/2021    RBCUA 2-5 06/07/2021    RBCUA NONE 09/13/2013    MUCUS Present 10/22/2018    BACTERIA MANY 06/07/2021    CLARITYU Clear 06/07/2021    SPECGRAV >=1.030 06/07/2021    LEUKOCYTESUR Negative 06/07/2021    UROBILINOGEN 4.0 06/07/2021    BILIRUBINUR MODERATE 06/07/2021    BLOODU SMALL 06/07/2021    GLUCOSEU Negative 06/07/2021    AMORPHOUS MODERATE 06/07/2021       Student's Assessment and Plan     [address] is a 60 y.o. male with a past medical history of intussuseption at 1days old and past surgical history of appendectomy, cholecystectomy who presented to the ED with shortness of breath and pleuritic chest pain. 1. Pneumonia   - CT and CXR are consistent with a RUL pneumonia   - afebrile   - WBC 6.5, procalcitonin 1.70   - respiratory panel is negative, sputum gram stain negative   - continue IV levofloxacin   - f/u respiratory culture, strep pneumonia and legionella urine antigen   - monitor CBC daily   - f/u blood culture    2. Transaminitis of unknown etiology   - hepatitis panel ordered    - trend CMP for liver enzmes    3. Chest Pain   - troponin and EKG within normal limits   - pro-BNP is 211   - family hx of sudden cardiac death secondary to dilated cardiomyopathy   - echo pending    4.  Diarrhea    - chronic s/p intussusception during childhood   - loperamide prn     DVT prophylaxis: enoxaparin    Kathi Barrios  MS3  Attending physician: Dr. Duane Dawn    Electronically signed by Kathi Barrios on 6/10/2021 at 9:08 AM

## 2021-06-10 NOTE — PROGRESS NOTES
[facility] CLINICAL PHARMACY NOTE: MEDS TO BEDS    Total # of Prescriptions Filled: 1   The following medications were delivered to the patient:  · Levofloxacin 750 mg    Additional Documentation:

## 2021-06-11 LAB
HAV IGM SER IA-ACNC: NORMAL
HEPATITIS B CORE IGM ANTIBODY: NORMAL
HEPATITIS B SURFACE ANTIGEN INTERPRETATION: NORMAL
HEPATITIS C ANTIBODY INTERPRETATION: NORMAL

## 2021-06-11 NOTE — DISCHARGE SUMMARY
[address] [facility]  Department of Internal Medicine   Internal Medicine Residency Program   Discharge Summary    PCP: Paty Romo MD    House Team: 1    Admit Date:6/7/2021  Discharge Date:6/11/2021     Admission Diagnosis:  1. Moderate Risk CAP   2. Leukocytosis likely 2/2 to above   3. Transaminitis, unknown etiology   4. Chest Pain, pleuritic likely 2/2 to #1  5. Family Hx of Sudden cardiac death   6. Chronic Diarrhea 2/2 to Colectomy 2/2 to Intussuception    Discharge Diagnosis:  1. Moderate Risk CAP   2. Leukocytosis likely 2/2 to above   3. Transaminitis, unknown etiology   4. Chest Pain, pleuritic likely 2/2 to #1  5. Family Hx of Sudden cardiac death   6. Chronic Diarrhea 2/2 to Colectomy 2/2 to Intussuception      Hospital Course:  Rick Colindres is a 60 y.o. male  male past surgical history of intussuseption (at 1days old), SBO and appendicitis s/p surgery, and chronic cholecystitis s/p cholecystectomy. He came in for cough and shorness of breath with productive sputum. In the ED, patient vital sign is stable except low-grade fever with T-max of 100. Lab wise, ALT is 62, AST is 58, white count is 14.8 with neutrophil predominant 87%. Chest x-ray shows right upper lobe consolidation, and CT of the chest shows right upper lobe pneumonia, with no evidence of central pulmonary embolism, with suboptimals study of the subsegmental pulmonary arteries.     Patient was given 1 L of fluid, treated with 1 dose of Levaquin (allergic to penicillin with reaction as anaphylaxis). Blood culture 2 bottles were sent and patient transferred to floor for further management    Patient was started on Levaquin since he was allergic to penicillins. Respiratory cultures sent were negative. His transaminitis improved. Hepatitis and HIV workup were negative.  He still had concerns of chest pain and since he had history of sudden cardiac death in his family, an ECHO was done to rule out any abnormalities. Results were pending during discharge. With improvement of his symptoms his Levaquin was switched to PO levaquin 750 mg daily to finish on 6/14/2021.  He is to follow up with [facility] on 6/21/2021      Significant findings (history and exam, laboratory, radiological, pathology, other tests):  CBC:   Lab Results   Component Value Date    WBC 6.5 06/10/2021    RBC 4.69 06/10/2021    HGB 13.6 06/10/2021    HCT 41.7 06/10/2021    MCV 88.9 06/10/2021    MCH 29.0 06/10/2021    MCHC 32.6 06/10/2021    RDW 13.9 06/10/2021     06/10/2021    MPV 11.1 06/10/2021     CMP:    Lab Results   Component Value Date     06/10/2021    K 3.9 06/10/2021     06/10/2021    CO2 25 06/10/2021    BUN 23 06/10/2021    CREATININE 1.0 06/10/2021    GFRAA >60 06/10/2021    LABGLOM >60 06/10/2021    GLUCOSE 104 06/10/2021    PROT 7.4 06/10/2021    LABALBU 3.6 06/10/2021    CALCIUM 8.6 06/10/2021    BILITOT 0.3 06/10/2021    ALKPHOS 98 06/10/2021    AST 36 06/10/2021    ALT 49 06/10/2021        Echo Complete    Result Date: 6/10/2021  Transthoracic Echocardiography Report (TTE)  Demographics   Patient Name     India Gavin        Gender             Male                   [facility]   Medical Record   [id number]        Room Number        8423  Number   Account #        [de-identified]       Procedure Date     06/10/2021   Corporate ID                     Ordering Physician Lidia Stark   Accession Number [id number]      Referring                                   Physician   Date of Birth    1965      Sonographer        Isabela Wolfe   Age              60 year(s)      Interpreting       Aad Haney MD                                   Physician                                    Any Other  Procedure Type of Study   TTE procedure  Procedure Date Date: 06/10/2021 Start: 03:08 PM Study Location: Portable Technical Quality: Adequate visualization Indications:Chest pain. Patient Status: Pending Discharge Height: 74 inches Weight: 210 pounds BSA: 2.22 m^2 BMI: 26.96 kg/m^2 BP: 18/87 mmHg  Findings   Left Ventricle  Left ventricle size is normal.  Normal left ventricular wall thickness. Normal left ventricular systolic function. Ejection fraction is visually estimated at 55%. There is doppler evidence of stage I diastolic dysfunction. Right Ventricle  Normal right ventricular function (TAPSE 2.9 cm). Left Atrium  Normal sized left atrium by volume index. Right Atrium  Normal sized right atrium. Mitral Valve  Physiologic and/or trace mitral regurgitation. No evidence of hemodynamically significant mitral stenosis. Tricuspid Valve  Physiologic and/or trace tricuspid regurgitation. PASP is estimated at 22 mmHg (normal estimated PASP). Aortic Valve  Aortic valve opens well. The aortic valve is trileaflet. No evidence of hemodynamically significant aortic stenosis. Mild aortic regurgitation. Pulmonic Valve  Physiologic and/or trace pulmonic regurgitation. Pericardial Effusion  No evidence of a hemodynamically significant pericardial effusion. Aorta  Aortic root dimension within normal limits. Conclusions   Summary  Normal left ventricular systolic function. Ejection fraction is visually estimated at 55%. Normal right ventricular function (TAPSE 2.9 cm). There is doppler evidence of stage I diastolic dysfunction. Mild aortic regurgitation. Physiologic and/or trace tricuspid regurgitation. PASP is estimated at 22 mmHg (normal estimated PASP).    Signature   ----------------------------------------------------------------  Electronically signed by Jozef Borges MD(Interpreting  physician) on 06/10/2021 04:38 PM  ----------------------------------------------------------------  M-Mode/2D Measurements & Calculations   LV Diastolic    LV Systolic Dimension: 3.4   AV Cusp Separation: 2.4 cmLA  Dimension: 4.9  cm Dimension: 3.9 cmAO Root  cm              LV Volume Diastolic: 234.3   Dimension: 3.3 cm  LV FS:30.6 %    ml  LV PW           LV Volume Systolic: 89.5 ml  Diastolic: 0.7  LV EDV/LV EDV Index: 111.9  cm              ml/50 ml/m^2LV ESV/LV ESV    RV Diastolic Dimension: 4.1  LV PW Systolic: Index: 93.9 XO/88PV/ m^2     cm  1.7 cm          EF Calculated: 58.2 %  Septum          LV Mass Index: 59 l/min*m^2  LA/Aorta: 0.10  Diastolic: 0.9                               Ascending Aorta: 4.1 cm  cm                                           LA volume/Index: 49.3 ml  Septum          LVOT: 2.3 cm                 /58.90OH/D^5  Systolic: 1.3                                RA Area: 25 cm^2  cm                                               IVC Expiration: 1.5 cm  LV Mass: 131.2  g  Doppler Measurements & Calculations   MV Peak E-Wave:   AV Peak Velocity: 1.23 m/s    LVOT Peak Velocity: 0.9  0.51 m/s          AV Peak Gradient: 6 mmHg      m/s  MV Peak A-Wave:   AV Mean Velocity: 0.87 m/s    LVOT Mean Velocity: 0.56  0.68 m/s          AV Mean Gradient: 3.6 mmHg    m/s  MV E/A Ratio:     AV VTI: 23.7 cm               LVOT Peak Gradient: 3.2  0.76              AV Area (Continuity):3.14     mmHgLVOT Mean Gradient:  MV Peak Gradient: cm^2                          1.6 mmHg  2.6 mmHg          AV Deceleration Time: 1954.2  Estimated RVSP: 21.61 mmHg  MV Mean Gradient: msec                          Estimated RAP:3 mmHg  1 mmHg            LVOT VTI: 17.9 cm  MV Mean Velocity: IVRT: 87.7 msec  0.48 m/s          Estimated PASP: 21.61 mmHg    TR Velocity:2.16 m/s  MV Deceleration   Pulm. Vein A Reversal         TR Gradient:18.61 mmHg  Time: 348.8 msec  Duration:138.4 msec           PV Peak Velocity: 1.12 m/s  MV P1/2t: 107.2   Pulm. Vein D Velocity:0.46    PV Peak Gradient: 5 mmHg  msec              m/sPulm.  Vein A Reversal      PV Mean Velocity: 0.76 m/s  MVA by PHT:2.05   Velocity:0.36 m/s             PV Mean Gradient: 2.7 mmHg  cm^2 Pulm. Vein S Velocity: 0.44  MV Area           m/s                           KS ED Velocity: 1.12 m/s  (continuity): 4  cm^2  MV E' Septal  Velocity: 0.05  m/s  MV E' Lateral  Velocity: 7 m/s  [id number]    XR CHEST (2 VW)    Result Date: 6/7/2021  EXAMINATION: TWO XRAY VIEWS OF THE CHEST 6/7/2021 4:53 pm COMPARISON: 05/05/2019 HISTORY: ORDERING SYSTEM PROVIDED HISTORY: cp TECHNOLOGIST PROVIDED HISTORY: Reason for exam:->cp What reading provider will be dictating this exam?->CRC FINDINGS: Pulmonary consolidation in the right upper lobe likely pneumonia. The left lung is clear. The cardiomediastinal contour is unremarkable. No pneumothorax or pleural effusion is seen. Right upper lobe consolidation, likely pneumonia. Radiographic follow-up is recommended. CTA CHEST W CONTRAST    Result Date: 6/8/2021  EXAMINATION: CTA OF THE CHEST 6/7/2021 11:43 pm TECHNIQUE: CTA of the chest was performed after the administration of intravenous contrast.  Multiplanar reformatted images are provided for review. MIP images are provided for review. Dose modulation, iterative reconstruction, and/or weight based adjustment of the mA/kV was utilized to reduce the radiation dose to as low as reasonably achievable. COMPARISON: 05/06/2019 HISTORY: ORDERING SYSTEM PROVIDED HISTORY: sob, TECHNOLOGIST PROVIDED HISTORY: Reason for exam:->sob, Decision Support Exception - unselect if not a suspected or confirmed emergency medical condition->Emergency Medical Condition (MA) What reading provider will be dictating this exam?->CRC FINDINGS: Pulmonary Arteries: Subsegmental pulmonary arteries suboptimally opacified and therefore inadequately evaluated. No definite filling defect in the more central pulmonary arteries. Mediastinum: No evidence of mediastinal lymphadenopathy.   The heart and pericardium

## 2021-06-13 LAB
BLOOD CULTURE, ROUTINE: NORMAL
CULTURE, BLOOD 2: NORMAL

## 2021-06-21 ENCOUNTER — TELEPHONE (OUTPATIENT)
Dept: INTERNAL MEDICINE | Age: 56
End: 2021-06-21

## 2021-06-21 ENCOUNTER — HOSPITAL ENCOUNTER (OUTPATIENT)
Age: 56
Discharge: HOME | End: 2021-06-23
Payer: MEDICAID

## 2021-06-21 ENCOUNTER — HOSPITAL ENCOUNTER (OUTPATIENT)
Age: 56
Discharge: HOME | End: 2021-06-21
Payer: MEDICAID

## 2021-06-21 ENCOUNTER — HOSPITAL ENCOUNTER (OUTPATIENT)
Dept: GENERAL RADIOLOGY | Age: 56
Discharge: HOME | End: 2021-06-23
Payer: MEDICAID

## 2021-06-21 ENCOUNTER — OFFICE VISIT (OUTPATIENT)
Dept: INTERNAL MEDICINE | Age: 56
End: 2021-06-21
Payer: MEDICAID

## 2021-06-21 VITALS
TEMPERATURE: 98.5 F | HEART RATE: 69 BPM | WEIGHT: 205 LBS | BODY MASS INDEX: 26.31 KG/M2 | RESPIRATION RATE: 18 BRPM | SYSTOLIC BLOOD PRESSURE: 120 MMHG | DIASTOLIC BLOOD PRESSURE: 78 MMHG | HEIGHT: 74 IN

## 2021-06-21 DIAGNOSIS — R06.09 DYSPNEA ON EXERTION: ICD-10-CM

## 2021-06-21 DIAGNOSIS — R06.09 DYSPNEA ON EXERTION: Primary | ICD-10-CM

## 2021-06-21 LAB
BASOPHILS ABSOLUTE: 0.05 E9/L (ref 0–0.2)
BASOPHILS RELATIVE PERCENT: 0.7 % (ref 0–2)
EOSINOPHILS ABSOLUTE: 0.18 E9/L (ref 0.05–0.5)
EOSINOPHILS RELATIVE PERCENT: 2.4 % (ref 0–6)
HCT VFR BLD CALC: 39.7 % (ref 37–54)
HEMOGLOBIN: 12.9 G/DL (ref 12.5–16.5)
IMMATURE GRANULOCYTES #: 0.05 E9/L
IMMATURE GRANULOCYTES %: 0.7 % (ref 0–5)
LYMPHOCYTES ABSOLUTE: 1.95 E9/L (ref 1.5–4)
LYMPHOCYTES RELATIVE PERCENT: 26.4 % (ref 20–42)
MCH RBC QN AUTO: 28.9 PG (ref 26–35)
MCHC RBC AUTO-ENTMCNC: 32.5 % (ref 32–34.5)
MCV RBC AUTO: 88.8 FL (ref 80–99.9)
MONOCYTES ABSOLUTE: 0.54 E9/L (ref 0.1–0.95)
MONOCYTES RELATIVE PERCENT: 7.3 % (ref 2–12)
NEUTROPHILS ABSOLUTE: 4.62 E9/L (ref 1.8–7.3)
NEUTROPHILS RELATIVE PERCENT: 62.5 % (ref 43–80)
PDW BLD-RTO: 13.2 FL (ref 11.5–15)
PLATELET # BLD: 405 E9/L (ref 130–450)
PMV BLD AUTO: 10.3 FL (ref 7–12)
RBC # BLD: 4.47 E12/L (ref 3.8–5.8)
WBC # BLD: 7.4 E9/L (ref 4.5–11.5)

## 2021-06-21 PROCEDURE — 71046 X-RAY EXAM CHEST 2 VIEWS: CPT

## 2021-06-21 PROCEDURE — 36415 COLL VENOUS BLD VENIPUNCTURE: CPT

## 2021-06-21 PROCEDURE — 1111F DSCHRG MED/CURRENT MED MERGE: CPT | Performed by: INTERNAL MEDICINE

## 2021-06-21 PROCEDURE — 99212 OFFICE O/P EST SF 10 MIN: CPT | Performed by: INTERNAL MEDICINE

## 2021-06-21 PROCEDURE — 85025 COMPLETE CBC W/AUTO DIFF WBC: CPT

## 2021-06-21 PROCEDURE — 99214 OFFICE O/P EST MOD 30 MIN: CPT | Performed by: INTERNAL MEDICINE

## 2021-06-21 RX ORDER — ALBUTEROL SULFATE 90 UG/1
2 AEROSOL, METERED RESPIRATORY (INHALATION) 4 TIMES DAILY PRN
Qty: 1 INHALER | Refills: 0 | Status: SHIPPED
Start: 2021-06-21 | End: 2021-07-06 | Stop reason: SINTOL

## 2021-06-21 SDOH — ECONOMIC STABILITY: FOOD INSECURITY: WITHIN THE PAST 12 MONTHS, YOU WORRIED THAT YOUR FOOD WOULD RUN OUT BEFORE YOU GOT MONEY TO BUY MORE.: NEVER TRUE

## 2021-06-21 SDOH — ECONOMIC STABILITY: FOOD INSECURITY: WITHIN THE PAST 12 MONTHS, THE FOOD YOU BOUGHT JUST DIDN'T LAST AND YOU DIDN'T HAVE MONEY TO GET MORE.: NEVER TRUE

## 2021-06-21 ASSESSMENT — PATIENT HEALTH QUESTIONNAIRE - PHQ9
SUM OF ALL RESPONSES TO PHQ QUESTIONS 1-9: 2
1. LITTLE INTEREST OR PLEASURE IN DOING THINGS: 1
2. FEELING DOWN, DEPRESSED OR HOPELESS: 1
SUM OF ALL RESPONSES TO PHQ QUESTIONS 1-9: 2
SUM OF ALL RESPONSES TO PHQ QUESTIONS 1-9: 2
SUM OF ALL RESPONSES TO PHQ9 QUESTIONS 1 & 2: 2

## 2021-06-21 ASSESSMENT — SOCIAL DETERMINANTS OF HEALTH (SDOH): HOW HARD IS IT FOR YOU TO PAY FOR THE VERY BASICS LIKE FOOD, HOUSING, MEDICAL CARE, AND HEATING?: NOT VERY HARD

## 2021-06-21 NOTE — PROGRESS NOTES
Post-Discharge Transitional Care Management Services or Hospital Follow Up      Josse Simon   YOB: 1965    Date of Office Visit:  6/21/2021  Date of Hospital Admission: 6/7/21  Date of Hospital Discharge: 6/10/21  Risk of hospital readmission (high >=14%. Medium >=10%) :Readmission Risk Score: 10      Care management risk score Rising risk (score 2-5) and Complex Care (Scores >=6): 4     Non face to face  following discharge, date last encounter closed (first attempt may have been earlier): *No documented post hospital discharge outreach found in the last 14 days    Call initiated 2 business days of discharge: *No response recorded in the last 14 days    Patient Active Problem List   Diagnosis    Pulmonary nodules    History of cholecystectomy    Pneumonia       Allergies   Allergen Reactions    Asa [Aspirin] Anaphylaxis    Pcn [Penicillins] Anaphylaxis    Rye Grass Flower Pollen Extract [Gramineae Pollens]        Medications listed as ordered at the time of discharge from hospital  There are no discharge medications for this patient. Medications marked \"taking\" at this time  No outpatient medications have been marked as taking for the 6/21/21 encounter (Office Visit) with Cody Rhodes MD.        Medications patient taking as of now reconciled against medications ordered at time of hospital discharge: {YES / YB:34654}    Chief Complaint   Patient presents with    Follow-Up from Hospital     pneumonia    Results    Cough       History of Present illness - Follow up of Hospital diagnosis(es): ***    Inpatient course: Discharge summary reviewed- see chart. Interval history/Current status: ***    {OPTIONAL WHEN USING 54599 - ROS (THIS WILL AUTO-DELETE IF NOT USED) :[id number]::\"A comprehensive review of systems was negative except for what was noted in the HPI. \"}    Vitals:    06/21/21 0810   BP: 120/78   Site: Right Upper Arm   Position: Sitting   Cuff Size: Medium Adult Pulse: 69   Resp: 18   Temp: 98.5 °F (36.9 °C)   TempSrc: Oral   Weight: 205 lb (93 kg)   Height: 6' 2\" (1.88 m)     Body mass index is 26.32 kg/m². Wt Readings from Last 3 Encounters:   21 205 lb (93 kg)   21 210 lb (95.3 kg)   19 199 lb (90.3 kg)     BP Readings from Last 3 Encounters:   21 120/78   06/10/21 126/83   19 126/79        Physical Exam:  {GENERAL PHYSICAL IXV}    Assessment/Plan:  1.  Dyspnea on exertion  ***  - NV DISCHARGE MEDS RECONCILED W/ CURRENT OUTPATIENT MED LIST        Medical Decision Making: {TCMPN4:28366}

## 2021-06-21 NOTE — PATIENT INSTRUCTIONS
1. Obtain Chest xray and CBC today. 2. Start using albuterol inhaler 2 puffs at time for shortness of breath. 3. Further workup pending imaging and lab. 4. Follow up in 2 week in the office.

## 2021-06-21 NOTE — LETTER
[facility] Internal Medicine  [address] [place] [address]  Phone: [phone]  Fax: [phone]    Cody Rhodes MD        June 21, 2021     Patient: Josse Simon   YOB: 1965   Date of Visit: 6/21/2021       To Whom it May Concern:    Joana Mart was seen in my clinic on 6/21/2021. He may return to work on July 5, 2021. If you have any questions or concerns, please don't hesitate to call.     Sincerely,         Cody Rhodes MD

## 2021-06-21 NOTE — TELEPHONE ENCOUNTER
Patient called in to say he was prescribed Albuterol today. He took two puffs and within 20 minutes, he developed a rash on his arm. Patient instructed to stop using Albuteral immediately and the attending would be notified. Someone will call patient back with further instructions.

## 2021-06-21 NOTE — PROGRESS NOTES
[facility]  Internal Medicine Residency Program  [facility] Note      SUBJECTIVE:  PMH:  Chronic cholecystitis s/p cholecystectomy      CC: had concerns including Follow-Up from Hospital (pneumonia), Results, and Cough. HPI:Bernabe Mcallister presented to the [facility] for a hospital follow up     Hospital course: Patient was in the hospital 6/76/10 for complaints of cough and shortness of breath with productive sputum; low-grade fever T-max 100 Fahrenheit, mild transaminitis ALT 62 AST 58, leukocytosis 14.8. Chest x-ray showing right upper lobe consolidation and CT chest showing right upper lobe pneumonia. Patient was treated with levofloxacin (allergic to penicillin with anaphylaxis noted to reaction). Respiratory cultures negative. Transaminitis improved. Hepatitis/HIV work-up negative. Echo done, patient having pleuritic chest pain and sudden cardiac death in the family, with results   pending. Patient was discharged on Levaquin 750 mg daily to finish on 6/14/2021. Pending results:  Echo: Heart failure preserved ejection fraction 55%:   Left ventricle size is normal.   Normal left ventricular wall thickness. Normal left ventricular systolic function. Ejection fraction is visually estimated at 55%  There is doppler evidence of stage I diastolic dysfunction. Blood cultures: No growth in 5 days    Current status:  Some shortness of breath; dyspnea on exertion; not smoker  Cough: dry, no sputum  Night sweats; persistent since the start of the symptoms    On examination, breath sounds are normal with no crackles, rales or wheezes bilaterally. Heart sounds are normal.  Ambulatory pulse ox normal with saturation ranging from 95-96%. But patient noted to have significant shortness of breath. Discussed with patient regarding getting a stat chest x-ray and also a CBC. Patient will be discharged on a as needed albuterol inhaler.   Chest x-ray and CBC normal, follow-up to be done in 2 weeks in the clinic. Plan: if symptoms don't resolve; to rule out tuberculosis and CAD      Review Of Systems:  General: no fevers, chills, weight loss or gain. Significant night sweats daily  Ears/Nose/Throat: no hearing loss, tinnitus, vertigo, nosebleed, nasal congestion, rhinorrhea, sore throat  Respiratory: cough+, no pleuritic chest pain, dyspnea+ on exertion, no wheezing  Cardiovascular: no chest pain, no angina, dyspnea on exertion+, no orthopnea, no PND, no palpitations, or claudication  Gastrointestinal: no nausea, vomiting, heartburn, diarrhea, constipation, abdominal pain, hematochezia or melena  Genitourinary: no urinary urgency, frequency, dysuria, nocturia, hesitancy, or incontinence  Musculoskeletal: no arthritis, arthralgia, myalgia, weakness, or morning stiffness  Skin: no abnormal pigmentation, rash, itching, masses, hair or nail changes    No current outpatient medications on file prior to visit. No current facility-administered medications on file prior to visit. OBJECTIVE:    VS: /78 (Site: Right Upper Arm, Position: Sitting, Cuff Size: Medium Adult)   Pulse 69   Temp 98.5 °F (36.9 °C) (Oral)   Resp 18   Ht 6' 2\" (1.88 m)   Wt 205 lb (93 kg)   BMI 26.32 kg/m²   General appearance: Alert, Awake, Oriented times 3, no distress  Head: atraumatic, normocephalic  Neck: no JVD, trachea midline, no adenopathy  Ear: bilateral grossly equal hearing, external ear normal  Eyes: bilateral pupils are equal, round and reactive, no icterus, no pallor, EOM intact, no nystagmus  Lungs: Lungs clear to auscultation bilaterally. No rhonchi, crackles or wheezes  Heart: S1 S2  Regular rate and rhythm. No rub, murmur or gallop  Abdomen: Abdomen soft but obese, non-tender. non-distended BS normal. No masses, organomegaly, no guarding rebound or rigidity.   Extremities: No edema, Peripheral pulses palpable 2/4  Hematology: no petechia, purpura or ecchymoses, no adenopathy  Neurology: CN grossly

## 2021-06-21 NOTE — PROGRESS NOTES
[facility]  Internal Medicine Residency Clinic    Attending Physician Statement  I have discussed the case, including pertinent history and exam findings with the resident physician. I saw and examined the patiient with the resident. I agree with the assessment, plan and orders as documented by the resident. I have reviewed all pertinent PMHx, PSHx, FamHx, SocialHx, medications, and allergies and updated history as appropriate. Patient presents for hospital follow up appointment. RUL pneumonia - completed a course of levofloxacin but admits to a non-productive cough, KAHN and night sweats. No further fevers or chills. Recheck CXR to ensure there is no loculations as pneumonia was extensive and with night sweats. Patient was very SOB after ambulation. Albuterol PRN   Return in 2 weeks if CXR is normal.    Check CBC as well. Vitals:    06/21/21 0810   BP: 120/78   Site: Right Upper Arm   Position: Sitting   Cuff Size: Medium Adult   Pulse: 69   Resp: 18   Temp: 98.5 °F (36.9 °C)   TempSrc: Oral   Weight: 205 lb (93 kg)   Height: 6' 2\" (1.88 m)     Lungs:  CTA B,no wheezes or rhonchi. CVS:  +s1/s2 without m/g/r appreciated. Abd:  + BS, NTND, No renal or aortic bruits     Remainder of medical problems as per resident note.     Tonya Bergeron MD  6/21/2021 8:38 AM

## 2021-06-21 NOTE — TELEPHONE ENCOUNTER
Covering physician. Patient seen earlier this am in clinic for post-hospital discharge and follow-up needs. He was given an albuterol inhaler to start as needed for dyspnea with planned additional work-up. He states he initiated at home early afternoon for dyspnea and noted immediate left arm rash within 30 minutes. He notes now rash has not spread. He has stopped any subsequent use of inhaler- instructed to discontinue use for now. Denies any Cardiac, GI, or Respiratory symptoms or changes. Hx of anaphylaxis from N and notes no similar symptoms at this time and feels well overall except for persistent rash isolated to Left arm. He is aware should symptoms change- he is to go directly to ED. He has diphenhydramine at home and uses prn for allergies and has taken at this time. He provided teachback that should symptoms change go directly to ED. He is also aware clinic to look for follow-up appt. He was also instructed to contact office tomorrow with update of symptoms should change in management be necessary. FYI to staff and providers for review.

## 2021-06-21 NOTE — PROGRESS NOTES
pts pulse ox on ra 96% ambulated pt in ann pulse ox 95% on ra    Printed xray and lab script and all instructions given to patient by dr Carson Womack appt scheduled and printed avs given to pt  Work letter given also

## 2021-06-22 NOTE — TELEPHONE ENCOUNTER
Spoke with patient who states the rash \"is almost gone\" and he \"feel pretty good\". Earlier appointment offered but patient declines. Encouraged patient to call in if he feels he needs to come in sooner. Patient stated understanding and compliance.

## 2021-07-05 NOTE — PROGRESS NOTES
[facility] Internal Medicine      SUBJECTIVE:  Maris Khoury (:  1965) is a 60 y.o. male here for evaluation of the following chief complaint(s):  Follow-up (for Respiratory status  remains SOB with exertion, denies fever coughing without no mucus noted)  Was admitted on 2021 for CAP. CXR on 2021 showed near complete resolution of RUL pneumonia. Was still with SOB at last visit as week as with night sweats and a non-productive cough. CBC done at that time was normal.     Allergy to the albuterol. Still with shortness of breath with exertion. Was able to walk a block and a half here. Returned to work which requires physical exertion. Was able previously to perform work without rest but now needs additional breaks. Still with cough. Non-productive. Still with night sweats but improved. No fevers/chills. Decreased appetite.  + lightheadedness with exertion. No vertigo. + urination at night. Normal echo on 2021. Family history of enlarged heart. Health maintenance - Colonoscopy needed. Patient agreeable to FIT test.       Review of Systems - see HPI    No current outpatient medications on file prior to visit. No current facility-administered medications on file prior to visit. OBJECTIVE:    VS:   Vitals:    21 0805   BP: 129/77   Pulse: 69   Resp: 18   Temp: 98.6 °F (37 °C)   TempSrc: Oral   SpO2: 97%   Weight: 205 lb 12.8 oz (93.4 kg)   Height: 6' 2\" (1.88 m)     Physical Exam   Lungs:  CTA B  Neck:   No carotid bruits appreciated B.   CVS:  +s1/s2 without m/g/r appreciated. Abd:  + BS, NTND, No renal or aortic bruits, midline scar from above umbilicus to suprapubic area.     Extr:  2+ DP/PT pulses B, no pitting edema      ASSESSMENT/PLAN:  Screening for colon cancer - patient willing to update   -     Give POCT Fit Test; Future - patient to return at next visit    Cough- ongoing secondary to effects of pneumonia in June  -    Check  XR CHEST STANDARD (2 VW); Future prior to next visit to ensure complete clearance. -     Give methylPREDNISolone (MEDROL DOSEPACK) 4 MG tablet; Take by mouth to see if this will improve cough. If still present at next visit, will order full PFTs. -     Agreeable to PNEUMOVAX 23 subcutaneous/IM (Pneumococcal polysaccharide vaccine 23-valent >= 3yo) today. RTC:  Return in about 4 weeks (around 8/3/2021).     Nely Mendoza MD   7/6/2021 8:47 AM

## 2021-07-06 ENCOUNTER — OFFICE VISIT (OUTPATIENT)
Dept: INTERNAL MEDICINE | Age: 56
End: 2021-07-06
Payer: MEDICAID

## 2021-07-06 VITALS
RESPIRATION RATE: 18 BRPM | DIASTOLIC BLOOD PRESSURE: 77 MMHG | HEIGHT: 74 IN | WEIGHT: 205.8 LBS | OXYGEN SATURATION: 97 % | SYSTOLIC BLOOD PRESSURE: 129 MMHG | HEART RATE: 69 BPM | BODY MASS INDEX: 26.41 KG/M2 | TEMPERATURE: 98.6 F

## 2021-07-06 DIAGNOSIS — R73.9 ELEVATED BLOOD SUGAR: Primary | ICD-10-CM

## 2021-07-06 DIAGNOSIS — R05.9 COUGH: ICD-10-CM

## 2021-07-06 DIAGNOSIS — Z12.11 SCREENING FOR COLON CANCER: ICD-10-CM

## 2021-07-06 PROCEDURE — 99212 OFFICE O/P EST SF 10 MIN: CPT | Performed by: INTERNAL MEDICINE

## 2021-07-06 PROCEDURE — 83036 HEMOGLOBIN GLYCOSYLATED A1C: CPT | Performed by: INTERNAL MEDICINE

## 2021-07-06 PROCEDURE — 90732 PPSV23 VACC 2 YRS+ SUBQ/IM: CPT

## 2021-07-06 PROCEDURE — 6360000002 HC RX W HCPCS

## 2021-07-06 PROCEDURE — 99213 OFFICE O/P EST LOW 20 MIN: CPT | Performed by: INTERNAL MEDICINE

## 2021-07-06 PROCEDURE — G0009 ADMIN PNEUMOCOCCAL VACCINE: HCPCS

## 2021-07-06 PROCEDURE — 82274 ASSAY TEST FOR BLOOD FECAL: CPT | Performed by: INTERNAL MEDICINE

## 2021-07-06 RX ORDER — METHYLPREDNISOLONE 4 MG/1
TABLET ORAL
Qty: 1 KIT | Refills: 0 | Status: SHIPPED | OUTPATIENT
Start: 2021-07-06 | End: 2021-07-12

## 2021-07-06 NOTE — LETTER
[facility]  [facility]  [address] [address]          Dear Lazara Shannon,    [address] [facility] office received back the FIT test used to screen for colon cancer. We are pleased to inform you that your test was normal.  This result has been recorded in your chart. This test is good for one year and repeat testing will be advised at that time. While FIT testing is helpful, the best strategy in screening for colon cancer remains colonoscopy. If you have not had a colonoscopy, we urge you to discuss this option with your physician. We are committed to partnering with you to achieve your best health. It is what we are meant to do!           The Health Care Team at [facility] Internal Medicine

## 2021-07-06 NOTE — PROGRESS NOTES
Patient verbalized understanding of office instructions. He will call with questions or concerns. Pt was given discharge instructions, Fit test kit with instructions and script for Chest X-ray to be done . Pnuemovax 23 vaccine given without no distress noted Printed AVS along with VIS given to pt. All questions were fully answered.

## 2021-07-06 NOTE — PROGRESS NOTES
[facility]  Internal Medicine Residency Program  ACC Note      SUBJECTIVE:  CC: had concerns including Follow-up (for Respiratory status  remains SOB with exertion, denies fever coughing without no mucus noted). HPI:  Casie Arias is a 60 y.o.male with PMH of intussusception (1days old), SBO, appendicitis presenting to [facility] for ***    Review of Systems    No outpatient medications have been marked as taking for the 7/6/21 encounter (Office Visit) with Phil Mcknight MD.       OBJECTIVE:    VS:   /77   Pulse 69   Temp 98.6 °F (37 °C) (Oral)   Resp 18   Ht 6' 2\" (1.88 m)   Wt 205 lb 12.8 oz (93.4 kg)   SpO2 95% Comment: on room air  BMI 26.42 kg/m²     EXAM:  Physical Exam    ASSESSMENT/PLAN:  I have reviewed all pertinent PMH, PSH, FH, SH, medications and allergies and updated history as appropriate. Sada Crews was seen today for follow-up.     Diagnoses and all orders for this visit:    Elevated blood sugar          RTC:      I have reviewed my findings and recommendations with Casie Juana and Dr Yamilet Shukla MD PGY-3  7/6/2021 8:07 AM

## 2021-07-06 NOTE — PATIENT INSTRUCTIONS
Bring back the FIT test at your next appointment   Obtain chest X-ray 2 days prior to your next visit.

## 2021-07-07 LAB — HBA1C MFR BLD: 5.5 %

## 2021-08-06 ENCOUNTER — HOSPITAL ENCOUNTER (OUTPATIENT)
Dept: GENERAL RADIOLOGY | Age: 56
Discharge: HOME | End: 2021-08-08
Payer: MEDICAID

## 2021-08-06 ENCOUNTER — HOSPITAL ENCOUNTER (OUTPATIENT)
Age: 56
Discharge: HOME | End: 2021-08-08
Payer: MEDICAID

## 2021-08-06 DIAGNOSIS — R05.9 COUGH: ICD-10-CM

## 2021-08-06 PROCEDURE — 71046 X-RAY EXAM CHEST 2 VIEWS: CPT

## 2021-08-10 ENCOUNTER — OFFICE VISIT (OUTPATIENT)
Dept: INTERNAL MEDICINE | Age: 56
End: 2021-08-10
Payer: MEDICAID

## 2021-08-10 VITALS
HEIGHT: 74 IN | DIASTOLIC BLOOD PRESSURE: 70 MMHG | WEIGHT: 206 LBS | HEART RATE: 60 BPM | RESPIRATION RATE: 18 BRPM | TEMPERATURE: 97.7 F | SYSTOLIC BLOOD PRESSURE: 119 MMHG | BODY MASS INDEX: 26.44 KG/M2

## 2021-08-10 DIAGNOSIS — L98.9 LESION OF SKIN OF NOSE: Chronic | ICD-10-CM

## 2021-08-10 DIAGNOSIS — L57.0 ACTINIC KERATOSIS: Primary | ICD-10-CM

## 2021-08-10 DIAGNOSIS — R06.02 SOB (SHORTNESS OF BREATH): ICD-10-CM

## 2021-08-10 PROCEDURE — 99203 OFFICE O/P NEW LOW 30 MIN: CPT | Performed by: STUDENT IN AN ORGANIZED HEALTH CARE EDUCATION/TRAINING PROGRAM

## 2021-08-10 PROCEDURE — 99214 OFFICE O/P EST MOD 30 MIN: CPT | Performed by: STUDENT IN AN ORGANIZED HEALTH CARE EDUCATION/TRAINING PROGRAM

## 2021-08-10 ASSESSMENT — ENCOUNTER SYMPTOMS
COUGH: 0
NAUSEA: 0
SHORTNESS OF BREATH: 1
VOMITING: 0
SORE THROAT: 0
WHEEZING: 0
DIARRHEA: 0
CONSTIPATION: 0
ABDOMINAL PAIN: 0

## 2021-08-10 NOTE — PATIENT INSTRUCTIONS
Dear Bonnie Morrow,        Thank you for coming to your appointment today. I hope we have addressed all of your needs. Please make sure to do the following:  - Referrals have been made to Dermatology:  If you do not hear from the office in 1 week, please call the number listed. - We will see each other again in 5 months    Call for a sooner appointment if you develop worsening shortness of breath, or new or worsening skin lesions. Have a great day!       Sincerely,  Tana Lin M.D  8/10/2021  8:55 AM

## 2021-08-10 NOTE — PROGRESS NOTES
[facility]  Internal Medicine Residency Clinic    Attending Physician Statement  I have discussed the case, including pertinent history and exam findings with the resident physician. I have seen and examined the patient and the key elements of the encounter have been performed by me. I agree with the assessment, plan and orders as documented by the resident. I have reviewed all pertinent PMHx, PSHx, FamHx, SocialHx, medications, and allergies and updated history as appropriate. Patient here for routine follow up of medical problems. 1. SOB: patient has history of ASA induced bronchospasm per patient and has anaphylactic reaction to ASA; ?bronchospasm at this time s/p PNA; patient had hive like reaction to the albuterol when he last used it and defers PFT at this time; will discus further evaluation at future visit  2. Actinic keratosis: copious sun exposure; patient attempted to drain the lesion without success; Dermatology referral   3. Nose Ulcer: present for 4 years after ?bite; nonhealing ulcer on right nose; dermatology referral   4. HCM: FIT testing for patient     Remainder of medical problems as per resident note.     Harmeet Drummond DO  8/10/2021 8:25 AM    Encounter time including independent chart review, discussion with patient, interpreting test results and/or external communications: 30'

## 2021-08-10 NOTE — PROGRESS NOTES
Patient given instructions. Understanding verbalized.  Fu appointment to be scheduled and pt will be contacted

## 2021-08-10 NOTE — PROGRESS NOTES
[facility]  Internal Medicine Residency Program  [facility] Note      SUBJECTIVE:  CC: had concerns including Other (per pt \"spot\" appeared on left arm about a week ago). HPI:  Taylor Farias is a 60 y.o.male with PMH of Intussuception as a 1year old presenting to [facility] for left arm lesion, SOB. Left Arm Lesion  Pt states this appeared 2 weeks ago. States, thought it was a cyst, tried draining it, a little blood came out, denies any pus coming from it. Pt has cats, dogs, tortoises. States he has been nibbled by the dogs during this time. Pt does report getting a lot of sun exposure in this area. Right Nose Skin Lesion  Pt has a nose skin lesion that has been present for the past 4 years and has not healed. Pt states he was bit there by a dog in the past     SOB  States he still has some SOB since recovering from PNA a couple months ago, but no more pleuritic chest pain. States he gets winded easily when taking a walk, and climbing stairs. Denies any cough or sputum production, denies hematochezia. States he currently takes no medications. Denies any further issues at this time. Review of Systems   Constitutional: Negative for activity change, appetite change, chills, fatigue, fever and unexpected weight change. HENT: Negative for sore throat. Respiratory: Positive for shortness of breath. Negative for cough and wheezing. Cardiovascular: Negative for chest pain, palpitations and leg swelling. Gastrointestinal: Negative for abdominal pain, constipation, diarrhea, nausea and vomiting. Genitourinary: Negative for difficulty urinating and dysuria. Skin:        Lesion left arm and nose   Neurological: Negative for dizziness, syncope, light-headedness and headaches. Psychiatric/Behavioral: Negative for behavioral problems.        No outpatient medications have been marked as taking for the 8/10/21 encounter (Office Visit) with Osman Ray MD.       OBJECTIVE:    VS: /70   Pulse 60   Temp 97.7 °F (36.5 °C) (Oral)   Resp 18   Ht 6' 2\" (1.88 m)   Wt 206 lb (93.4 kg)   BMI 26.45 kg/m²     EXAM:  Physical Exam  Vitals reviewed. Constitutional:       General: He is not in acute distress. Appearance: Normal appearance. HENT:      Head: Normocephalic and atraumatic. Eyes:      Extraocular Movements: Extraocular movements intact. Pupils: Pupils are equal, round, and reactive to light. Cardiovascular:      Rate and Rhythm: Normal rate and regular rhythm. Pulses: Normal pulses. Heart sounds: Normal heart sounds. Pulmonary:      Effort: Pulmonary effort is normal.      Breath sounds: Normal breath sounds. No wheezing, rhonchi or rales. Abdominal:      General: Bowel sounds are normal.      Palpations: Abdomen is soft. Tenderness: There is no abdominal tenderness. Musculoskeletal:         General: Normal range of motion. Cervical back: Normal range of motion and neck supple. Skin:     Capillary Refill: Capillary refill takes less than 2 seconds. Findings: Lesion present. Comments: Left arm lesion consistent with actinic keratosis. Right nose lesion, somewhat ulcerated, has some blood around the edges   Neurological:      General: No focal deficit present. Mental Status: He is alert and oriented to person, place, and time. Mental status is at baseline. Psychiatric:         Mood and Affect: Mood normal.         Behavior: Behavior normal.         ASSESSMENT/PLAN:  I have reviewed all pertinent PMH, PSH, FH, SH, medications and allergies and updated history as appropriate. Milagro Pierre was seen today for other.     Diagnoses and all orders for this visit:    Actinic keratosis  Comments:  Left Arm near wrist  - Appears to be near a spot on his arm that gets lots of sun all day, lesion appearance consistent with actinic keratosis  - Watch closely at this time    Lesion of skin of nose  Comments:  Right nose, not healing for 4 years  Orders:  -     Gregg Heard DO, Dermatology, [place] ([facility])  - Concerning since not healing for 4 years, and appear ulcerated, r/o BCC, SCC    SOB (shortness of breath)  - CXR showed complete resolution of PNA  - Continues to be SOB at this time, but should improve with time, will continue to follow pt closely      RTC: 5 months      I have reviewed my findings and recommendations with Chacho Menjivar and Dr Tammy Means MD PGY-1  8/10/2021 8:50 AM

## 2021-08-13 LAB
CONTROL: NORMAL
HEMOCCULT STL QL: NORMAL

## 2021-09-21 ENCOUNTER — HOSPITAL ENCOUNTER (INPATIENT)
Age: 56
LOS: 3 days | Discharge: HOME | DRG: 246 | End: 2021-09-25
Attending: EMERGENCY MEDICINE | Admitting: INTERNAL MEDICINE
Payer: MEDICAID

## 2021-09-21 ENCOUNTER — APPOINTMENT (OUTPATIENT)
Dept: CT IMAGING | Age: 56
DRG: 246 | End: 2021-09-21
Payer: MEDICAID

## 2021-09-21 DIAGNOSIS — R10.32 LEFT LOWER QUADRANT ABDOMINAL PAIN: ICD-10-CM

## 2021-09-21 DIAGNOSIS — K62.5 RECTAL BLEEDING: Primary | ICD-10-CM

## 2021-09-21 PROBLEM — K92.2 GI BLEED: Status: ACTIVE | Noted: 2021-09-21

## 2021-09-21 LAB
ALBUMIN SERPL-MCNC: 4 G/DL (ref 3.5–5.2)
ALP BLD-CCNC: 83 U/L (ref 40–129)
ALT SERPL-CCNC: 35 U/L (ref 0–40)
ANION GAP SERPL CALCULATED.3IONS-SCNC: 9 MMOL/L (ref 7–16)
AST SERPL-CCNC: 30 U/L (ref 0–39)
BACTERIA: ABNORMAL /HPF
BASOPHILS ABSOLUTE: 0.03 E9/L (ref 0–0.2)
BASOPHILS RELATIVE PERCENT: 0.3 % (ref 0–2)
BILIRUB SERPL-MCNC: 0.5 MG/DL (ref 0–1.2)
BILIRUBIN URINE: NEGATIVE
BLOOD, URINE: NEGATIVE
BUN BLDV-MCNC: 14 MG/DL (ref 6–20)
CALCIUM SERPL-MCNC: 8.9 MG/DL (ref 8.6–10.2)
CHLORIDE BLD-SCNC: 104 MMOL/L (ref 98–107)
CLARITY: CLEAR
CO2: 25 MMOL/L (ref 22–29)
COLOR: YELLOW
CREAT SERPL-MCNC: 0.9 MG/DL (ref 0.7–1.2)
EOSINOPHILS ABSOLUTE: 0.12 E9/L (ref 0.05–0.5)
EOSINOPHILS RELATIVE PERCENT: 1.1 % (ref 0–6)
EPITHELIAL CELLS, UA: ABNORMAL /HPF
GFR AFRICAN AMERICAN: >60
GFR NON-AFRICAN AMERICAN: >60 ML/MIN/1.73
GLUCOSE BLD-MCNC: 96 MG/DL (ref 74–99)
GLUCOSE URINE: NEGATIVE MG/DL
HCT VFR BLD CALC: 42.4 % (ref 37–54)
HEMOGLOBIN: 13.9 G/DL (ref 12.5–16.5)
IMMATURE GRANULOCYTES #: 0.04 E9/L
IMMATURE GRANULOCYTES %: 0.4 % (ref 0–5)
KETONES, URINE: NEGATIVE MG/DL
LACTIC ACID: 0.9 MMOL/L (ref 0.5–2.2)
LACTIC ACID: 0.9 MMOL/L (ref 0.5–2.2)
LEUKOCYTE ESTERASE, URINE: NORMAL
LIPASE: 22 U/L (ref 13–60)
LYMPHOCYTES ABSOLUTE: 1.59 E9/L (ref 1.5–4)
LYMPHOCYTES RELATIVE PERCENT: 14.4 % (ref 20–42)
MCH RBC QN AUTO: 28.4 PG (ref 26–35)
MCHC RBC AUTO-ENTMCNC: 32.8 % (ref 32–34.5)
MCV RBC AUTO: 86.5 FL (ref 80–99.9)
MONOCYTES ABSOLUTE: 0.94 E9/L (ref 0.1–0.95)
MONOCYTES RELATIVE PERCENT: 8.5 % (ref 2–12)
NEUTROPHILS ABSOLUTE: 8.36 E9/L (ref 1.8–7.3)
NEUTROPHILS RELATIVE PERCENT: 75.3 % (ref 43–80)
NITRITE, URINE: NEGATIVE
PDW BLD-RTO: 14 FL (ref 11.5–15)
PH UA: NORMAL (ref 5–9)
PLATELET # BLD: 259 E9/L (ref 130–450)
PMV BLD AUTO: 10.8 FL (ref 7–12)
POTASSIUM SERPL-SCNC: 4.3 MMOL/L (ref 3.5–5)
PROTEIN UA: NEGATIVE MG/DL
RBC # BLD: 4.9 E12/L (ref 3.8–5.8)
RBC UA: ABNORMAL /HPF (ref 0–2)
SODIUM BLD-SCNC: 138 MMOL/L (ref 132–146)
SPECIFIC GRAVITY UA: NORMAL (ref 1–1.03)
TOTAL PROTEIN: 6.8 G/DL (ref 6.4–8.3)
UROBILINOGEN, URINE: 0.2 E.U./DL
WBC # BLD: 11.1 E9/L (ref 4.5–11.5)
WBC UA: ABNORMAL /HPF (ref 0–5)

## 2021-09-21 PROCEDURE — 99283 EMERGENCY DEPT VISIT LOW MDM: CPT

## 2021-09-21 PROCEDURE — 83605 ASSAY OF LACTIC ACID: CPT

## 2021-09-21 PROCEDURE — 85025 COMPLETE CBC W/AUTO DIFF WBC: CPT

## 2021-09-21 PROCEDURE — 6360000004 HC RX CONTRAST MEDICATION: Performed by: RADIOLOGY

## 2021-09-21 PROCEDURE — 83690 ASSAY OF LIPASE: CPT

## 2021-09-21 PROCEDURE — 81001 URINALYSIS AUTO W/SCOPE: CPT

## 2021-09-21 PROCEDURE — 80053 COMPREHEN METABOLIC PANEL: CPT

## 2021-09-21 PROCEDURE — 74177 CT ABD & PELVIS W/CONTRAST: CPT

## 2021-09-21 RX ORDER — SODIUM CHLORIDE 9 MG/ML
INJECTION, SOLUTION INTRAVENOUS CONTINUOUS
Status: DISCONTINUED | OUTPATIENT
Start: 2021-09-21 | End: 2021-09-22

## 2021-09-21 RX ADMIN — IOPAMIDOL 90 ML: 755 INJECTION, SOLUTION INTRAVENOUS at 23:02

## 2021-09-21 ASSESSMENT — PAIN DESCRIPTION - ORIENTATION: ORIENTATION: LOWER

## 2021-09-21 ASSESSMENT — PAIN SCALES - GENERAL: PAINLEVEL_OUTOF10: 9

## 2021-09-21 ASSESSMENT — PAIN DESCRIPTION - FREQUENCY: FREQUENCY: CONTINUOUS

## 2021-09-21 ASSESSMENT — PAIN DESCRIPTION - LOCATION: LOCATION: ABDOMEN

## 2021-09-21 ASSESSMENT — PAIN DESCRIPTION - DESCRIPTORS: DESCRIPTORS: SHARP

## 2021-09-21 NOTE — ED TRIAGE NOTES
FIRST PROVIDER CONTACT ASSESSMENT NOTE      Department of Emergency Medicine   9/21/21  9:31 AM EDT    Chief Complaint: Rectal Bleeding (rectal bleeding, bright red blood noticed today)      History of Present Illness:    Hector Hair is a 60 y.o. male who presents to the ED by private car for left lower quadrant abdominal pain and rectal bleeding since yesterday. Focused Screening Exam:  Constitutional:  Alert, appears stated age and is in no distress.       *ALLERGIES*     Asa [aspirin], Pcn [penicillins], Rye grass flower pollen extract [gramineae pollens], and Albuterol sulfate     ED Triage Vitals [09/21/21 0853]   BP Temp Temp src Pulse Resp SpO2 Height Weight   -- 98.3 °F (36.8 °C) -- 80 -- 95 % -- --        Initial Plan of Care:  Initiate Treatment-Testing, Proceed toTreatment Area When Bed Available for ED Attending/MLP to Continue Care    -----------------END OF FIRST PROVIDER CONTACT ASSESSMENT NOTE--------------  Electronically signed by TAYLOR Johnson   DD: 9/21/21

## 2021-09-21 NOTE — LETTER
[facility]  [address] [facility] [address]  Phone: [phone]          September 25, 2021     Patient: Chitra Gardiner   YOB: 1965   Date of Visit: 9/21/2021       To Whom It May Concern: It is my medical opinion that Migel Duncan may return to full duty immediately with no restrictions. If you have any questions or concerns, please don't hesitate to call.     Sincerely,

## 2021-09-21 NOTE — Clinical Note
Patient Class: Inpatient [101]   REQUIRED: Diagnosis: GI bleed [801915]   Estimated Length of Stay: Estimated stay of more than 2 midnights   Telemetry/Cardiac Monitoring Required?: Yes

## 2021-09-21 NOTE — LETTER
[address] [address]  Phone: [phone]    No name on file. September 25, 2021     Patient: Clif Hunter   YOB: 1965   Date of Visit: 9/21/2021       To Whom It May Concern: It is my medical opinion that Zoey Favors may return to full duty immediately with no restrictions. If you have any questions or concerns, please don't hesitate to call.     Sincerely,

## 2021-09-21 NOTE — ED NOTES
Bed: HD  Expected date:   Expected time:   Means of arrival:   Comments:  Terminal clean     Keith Moran RN  09/21/21 [id number]

## 2021-09-22 PROBLEM — K52.9 COLITIS: Status: ACTIVE | Noted: 2021-09-22

## 2021-09-22 LAB
ALBUMIN SERPL-MCNC: 3.5 G/DL (ref 3.5–5.2)
ALP BLD-CCNC: 89 U/L (ref 40–129)
ALT SERPL-CCNC: 29 U/L (ref 0–40)
ANION GAP SERPL CALCULATED.3IONS-SCNC: 10 MMOL/L (ref 7–16)
AST SERPL-CCNC: 28 U/L (ref 0–39)
BASOPHILS ABSOLUTE: 0.03 E9/L (ref 0–0.2)
BASOPHILS RELATIVE PERCENT: 0.2 % (ref 0–2)
BILIRUB SERPL-MCNC: 0.7 MG/DL (ref 0–1.2)
BUN BLDV-MCNC: 16 MG/DL (ref 6–20)
CALCIUM SERPL-MCNC: 7.9 MG/DL (ref 8.6–10.2)
CHLORIDE BLD-SCNC: 108 MMOL/L (ref 98–107)
CO2: 22 MMOL/L (ref 22–29)
CREAT SERPL-MCNC: 0.9 MG/DL (ref 0.7–1.2)
EOSINOPHILS ABSOLUTE: 0.26 E9/L (ref 0.05–0.5)
EOSINOPHILS RELATIVE PERCENT: 1.9 % (ref 0–6)
GFR AFRICAN AMERICAN: >60
GFR NON-AFRICAN AMERICAN: >60 ML/MIN/1.73
GLUCOSE BLD-MCNC: 83 MG/DL (ref 74–99)
HCT VFR BLD CALC: 38.5 % (ref 37–54)
HCT VFR BLD CALC: 40.2 % (ref 37–54)
HEMOGLOBIN: 12.7 G/DL (ref 12.5–16.5)
HEMOGLOBIN: 13.2 G/DL (ref 12.5–16.5)
IMMATURE GRANULOCYTES #: 0.03 E9/L
IMMATURE GRANULOCYTES %: 0.2 % (ref 0–5)
LYMPHOCYTES ABSOLUTE: 1.61 E9/L (ref 1.5–4)
LYMPHOCYTES RELATIVE PERCENT: 11.5 % (ref 20–42)
MAGNESIUM: 2.3 MG/DL (ref 1.6–2.6)
MCH RBC QN AUTO: 28.6 PG (ref 26–35)
MCHC RBC AUTO-ENTMCNC: 32.8 % (ref 32–34.5)
MCV RBC AUTO: 87.2 FL (ref 80–99.9)
MONOCYTES ABSOLUTE: 1.28 E9/L (ref 0.1–0.95)
MONOCYTES RELATIVE PERCENT: 9.1 % (ref 2–12)
NEUTROPHILS ABSOLUTE: 10.82 E9/L (ref 1.8–7.3)
NEUTROPHILS RELATIVE PERCENT: 77.1 % (ref 43–80)
PDW BLD-RTO: 13.9 FL (ref 11.5–15)
PHOSPHORUS: 2.5 MG/DL (ref 2.5–4.5)
PLATELET # BLD: 212 E9/L (ref 130–450)
PMV BLD AUTO: 11 FL (ref 7–12)
POTASSIUM SERPL-SCNC: 3.9 MMOL/L (ref 3.5–5)
PROCALCITONIN: 0.1 NG/ML (ref 0–0.08)
RBC # BLD: 4.61 E12/L (ref 3.8–5.8)
ROTAVIRUS ANTIGEN: NORMAL
SODIUM BLD-SCNC: 140 MMOL/L (ref 132–146)
TOTAL PROTEIN: 6 G/DL (ref 6.4–8.3)
WBC # BLD: 14 E9/L (ref 4.5–11.5)

## 2021-09-22 PROCEDURE — 85025 COMPLETE CBC W/AUTO DIFF WBC: CPT

## 2021-09-22 PROCEDURE — G0378 HOSPITAL OBSERVATION PER HR: HCPCS

## 2021-09-22 PROCEDURE — 2580000003 HC RX 258: Performed by: INTERNAL MEDICINE

## 2021-09-22 PROCEDURE — 85014 HEMATOCRIT: CPT

## 2021-09-22 PROCEDURE — 1200000000 HC SEMI PRIVATE

## 2021-09-22 PROCEDURE — 6360000002 HC RX W HCPCS: Performed by: INTERNAL MEDICINE

## 2021-09-22 PROCEDURE — 2580000003 HC RX 258: Performed by: STUDENT IN AN ORGANIZED HEALTH CARE EDUCATION/TRAINING PROGRAM

## 2021-09-22 PROCEDURE — 2500000003 HC RX 250 WO HCPCS: Performed by: INTERNAL MEDICINE

## 2021-09-22 PROCEDURE — 83735 ASSAY OF MAGNESIUM: CPT

## 2021-09-22 PROCEDURE — 89055 LEUKOCYTE ASSESSMENT FECAL: CPT

## 2021-09-22 PROCEDURE — 87045 FECES CULTURE AEROBIC BACT: CPT

## 2021-09-22 PROCEDURE — 6370000000 HC RX 637 (ALT 250 FOR IP): Performed by: INTERNAL MEDICINE

## 2021-09-22 PROCEDURE — 36415 COLL VENOUS BLD VENIPUNCTURE: CPT

## 2021-09-22 PROCEDURE — 87040 BLOOD CULTURE FOR BACTERIA: CPT

## 2021-09-22 PROCEDURE — 84100 ASSAY OF PHOSPHORUS: CPT

## 2021-09-22 PROCEDURE — 87425 ROTAVIRUS AG IA: CPT

## 2021-09-22 PROCEDURE — 83993 ASSAY FOR CALPROTECTIN FECAL: CPT

## 2021-09-22 PROCEDURE — 2580000003 HC RX 258: Performed by: EMERGENCY MEDICINE

## 2021-09-22 PROCEDURE — 96365 THER/PROPH/DIAG IV INF INIT: CPT

## 2021-09-22 PROCEDURE — 84145 PROCALCITONIN (PCT): CPT

## 2021-09-22 PROCEDURE — 85018 HEMOGLOBIN: CPT

## 2021-09-22 PROCEDURE — 80053 COMPREHEN METABOLIC PANEL: CPT

## 2021-09-22 PROCEDURE — 96367 TX/PROPH/DG ADDL SEQ IV INF: CPT

## 2021-09-22 PROCEDURE — 96366 THER/PROPH/DIAG IV INF ADDON: CPT

## 2021-09-22 PROCEDURE — 99222 1ST HOSP IP/OBS MODERATE 55: CPT | Performed by: INTERNAL MEDICINE

## 2021-09-22 PROCEDURE — 99233 SBSQ HOSP IP/OBS HIGH 50: CPT | Performed by: SURGERY

## 2021-09-22 RX ORDER — PANTOPRAZOLE SODIUM 40 MG/10ML
40 INJECTION, POWDER, LYOPHILIZED, FOR SOLUTION INTRAVENOUS 2 TIMES DAILY
Status: DISCONTINUED | OUTPATIENT
Start: 2021-09-22 | End: 2021-09-22

## 2021-09-22 RX ORDER — SODIUM CHLORIDE 0.9 % (FLUSH) 0.9 %
5-40 SYRINGE (ML) INJECTION PRN
Status: DISCONTINUED | OUTPATIENT
Start: 2021-09-22 | End: 2021-09-25 | Stop reason: HOSPADM

## 2021-09-22 RX ORDER — PANTOPRAZOLE SODIUM 40 MG/10ML
40 INJECTION, POWDER, LYOPHILIZED, FOR SOLUTION INTRAVENOUS DAILY
Status: DISCONTINUED | OUTPATIENT
Start: 2021-09-23 | End: 2021-09-25

## 2021-09-22 RX ORDER — SODIUM CHLORIDE 0.9 % (FLUSH) 0.9 %
5-40 SYRINGE (ML) INJECTION EVERY 12 HOURS SCHEDULED
Status: DISCONTINUED | OUTPATIENT
Start: 2021-09-22 | End: 2021-09-25 | Stop reason: HOSPADM

## 2021-09-22 RX ORDER — SODIUM CHLORIDE, SODIUM LACTATE, POTASSIUM CHLORIDE, CALCIUM CHLORIDE 600; 310; 30; 20 MG/100ML; MG/100ML; MG/100ML; MG/100ML
INJECTION, SOLUTION INTRAVENOUS CONTINUOUS
Status: DISCONTINUED | OUTPATIENT
Start: 2021-09-22 | End: 2021-09-24

## 2021-09-22 RX ORDER — ACETAMINOPHEN 650 MG/1
650 SUPPOSITORY RECTAL EVERY 6 HOURS PRN
Status: DISCONTINUED | OUTPATIENT
Start: 2021-09-22 | End: 2021-09-25 | Stop reason: HOSPADM

## 2021-09-22 RX ORDER — POLYETHYLENE GLYCOL 3350 17 G/17G
17 POWDER, FOR SOLUTION ORAL DAILY PRN
Status: DISCONTINUED | OUTPATIENT
Start: 2021-09-22 | End: 2021-09-25 | Stop reason: HOSPADM

## 2021-09-22 RX ORDER — SODIUM CHLORIDE 9 MG/ML
10 INJECTION INTRAVENOUS 2 TIMES DAILY
Status: DISCONTINUED | OUTPATIENT
Start: 2021-09-22 | End: 2021-09-22

## 2021-09-22 RX ORDER — ONDANSETRON 4 MG/1
4 TABLET, ORALLY DISINTEGRATING ORAL EVERY 8 HOURS PRN
Status: DISCONTINUED | OUTPATIENT
Start: 2021-09-22 | End: 2021-09-25 | Stop reason: HOSPADM

## 2021-09-22 RX ORDER — SODIUM CHLORIDE 9 MG/ML
25 INJECTION, SOLUTION INTRAVENOUS PRN
Status: DISCONTINUED | OUTPATIENT
Start: 2021-09-22 | End: 2021-09-25 | Stop reason: HOSPADM

## 2021-09-22 RX ORDER — SODIUM CHLORIDE, SODIUM LACTATE, POTASSIUM CHLORIDE, CALCIUM CHLORIDE 600; 310; 30; 20 MG/100ML; MG/100ML; MG/100ML; MG/100ML
INJECTION, SOLUTION INTRAVENOUS CONTINUOUS
Status: DISCONTINUED | OUTPATIENT
Start: 2021-09-22 | End: 2021-09-22

## 2021-09-22 RX ORDER — CIPROFLOXACIN 2 MG/ML
400 INJECTION, SOLUTION INTRAVENOUS EVERY 12 HOURS
Status: DISCONTINUED | OUTPATIENT
Start: 2021-09-22 | End: 2021-09-25

## 2021-09-22 RX ORDER — SODIUM CHLORIDE 9 MG/ML
10 INJECTION INTRAVENOUS DAILY
Status: DISCONTINUED | OUTPATIENT
Start: 2021-09-23 | End: 2021-09-25

## 2021-09-22 RX ORDER — ONDANSETRON 2 MG/ML
4 INJECTION INTRAMUSCULAR; INTRAVENOUS EVERY 6 HOURS PRN
Status: DISCONTINUED | OUTPATIENT
Start: 2021-09-22 | End: 2021-09-25 | Stop reason: HOSPADM

## 2021-09-22 RX ORDER — ACETAMINOPHEN 325 MG/1
650 TABLET ORAL EVERY 6 HOURS PRN
Status: DISCONTINUED | OUTPATIENT
Start: 2021-09-22 | End: 2021-09-25 | Stop reason: HOSPADM

## 2021-09-22 RX ADMIN — METRONIDAZOLE 500 MG: 500 INJECTION, SOLUTION INTRAVENOUS at 19:28

## 2021-09-22 RX ADMIN — Medication 10 ML: at 11:36

## 2021-09-22 RX ADMIN — METRONIDAZOLE 500 MG: 500 INJECTION, SOLUTION INTRAVENOUS at 11:36

## 2021-09-22 RX ADMIN — SODIUM CHLORIDE: 9 INJECTION, SOLUTION INTRAVENOUS at 03:16

## 2021-09-22 RX ADMIN — CIPROFLOXACIN 400 MG: 2 INJECTION INTRAVENOUS at 05:06

## 2021-09-22 RX ADMIN — CIPROFLOXACIN 400 MG: 2 INJECTION INTRAVENOUS at 17:31

## 2021-09-22 RX ADMIN — SODIUM CHLORIDE, POTASSIUM CHLORIDE, SODIUM LACTATE AND CALCIUM CHLORIDE: 600; 310; 30; 20 INJECTION, SOLUTION INTRAVENOUS at 11:36

## 2021-09-22 RX ADMIN — ACETAMINOPHEN 650 MG: 325 TABLET ORAL at 20:53

## 2021-09-22 RX ADMIN — SODIUM CHLORIDE, POTASSIUM CHLORIDE, SODIUM LACTATE AND CALCIUM CHLORIDE: 600; 310; 30; 20 INJECTION, SOLUTION INTRAVENOUS at 20:54

## 2021-09-22 RX ADMIN — METRONIDAZOLE 500 MG: 500 INJECTION, SOLUTION INTRAVENOUS at 03:18

## 2021-09-22 ASSESSMENT — PAIN DESCRIPTION - PAIN TYPE: TYPE: ACUTE PAIN

## 2021-09-22 ASSESSMENT — ENCOUNTER SYMPTOMS
HOARSE VOICE: 0
NAUSEA: 1
COUGH: 0
HEMATOCHEZIA: 1
DIARRHEA: 0
SHORTNESS OF BREATH: 0
HEMATEMESIS: 0
ABDOMINAL PAIN: 1
VOMITING: 1
HEARTBURN: 0
BACK PAIN: 0
SORE THROAT: 0
CONSTIPATION: 0
WHEEZING: 0

## 2021-09-22 ASSESSMENT — PAIN DESCRIPTION - ORIENTATION: ORIENTATION: LEFT

## 2021-09-22 ASSESSMENT — PAIN SCALES - GENERAL
PAINLEVEL_OUTOF10: 3
PAINLEVEL_OUTOF10: 0
PAINLEVEL_OUTOF10: 0
PAINLEVEL_OUTOF10: 8

## 2021-09-22 ASSESSMENT — PAIN DESCRIPTION - FREQUENCY: FREQUENCY: CONTINUOUS

## 2021-09-22 ASSESSMENT — PAIN DESCRIPTION - PROGRESSION
CLINICAL_PROGRESSION: NOT CHANGED
CLINICAL_PROGRESSION: NOT CHANGED

## 2021-09-22 ASSESSMENT — PAIN DESCRIPTION - ONSET: ONSET: ON-GOING

## 2021-09-22 ASSESSMENT — PAIN DESCRIPTION - DESCRIPTORS: DESCRIPTORS: STABBING

## 2021-09-22 ASSESSMENT — PAIN DESCRIPTION - LOCATION: LOCATION: ABDOMEN

## 2021-09-22 NOTE — H&P
medications    Not on File       Allergies:  Asa [aspirin], Pcn [penicillins], Rye grass flower pollen extract [gramineae pollens], and Albuterol sulfate    Social History:   TOBACCO:   reports that he has never smoked. He has never used smokeless tobacco.  ETOH:   reports current alcohol use. Family History:       Problem Relation Age of Onset    Breast Cancer Mother     Cancer Father     Kidney Disease Father        REVIEW OF SYSTEMS:  Review of Systems   Constitutional: Negative for chills, fever and malaise/fatigue. HENT: Negative for congestion, hoarse voice and sore throat. Eyes: Negative for visual disturbance. Cardiovascular: Negative for chest pain, dyspnea on exertion, leg swelling and palpitations. Respiratory: Negative for cough, shortness of breath and wheezing. Skin: Negative for rash. Musculoskeletal: Negative for back pain, joint pain, myalgias and neck pain. Gastrointestinal: Positive for abdominal pain, hematochezia, nausea and vomiting. Negative for constipation, diarrhea, dysphagia, heartburn, hematemesis and melena. Genitourinary: Negative for dysuria, flank pain, frequency, hematuria and urgency. Neurological: Negative for dizziness, headaches, light-headedness and weakness. Psychiatric/Behavioral: Negative for altered mental status, depression and substance abuse. The patient is not nervous/anxious. Physical Exam   · Vitals: /79   Pulse 66   Temp 97.7 °F (36.5 °C) (Infrared)   Resp 19   Ht 6' 2\" (1.88 m)   Wt 206 lb (93.4 kg)   SpO2 97%   BMI 26.45 kg/m²    Physical Exam  Vitals reviewed. Constitutional:       General: He is not in acute distress. Appearance: Normal appearance. HENT:      Head: Normocephalic and atraumatic. Mouth/Throat:      Mouth: Mucous membranes are moist.      Pharynx: Oropharynx is clear. Eyes:      Extraocular Movements: Extraocular movements intact.       Conjunctiva/sclera: Conjunctivae normal.      Pupils: requiring clinical correlation. Other chronic/nonemergent findings, as described. Resident's Assessment and Plan     Assessment and Plan:    1. Rectal bleeding 2/2 diverticulosis  · Given left lower quadrant pain and bright red specks of blood believe this is a lower GI bleed  · CT abdomen/pelvis w IV contrast: regional colitis involving the descending and most proximal sigmoid colon. No pneumatosis, abscess or evidence of bowel rupture.   · General surgery consulted - No acute endoscopic procedures planned at this time  · POCT occult blood stool ordered  · Started on  mL/hour continuous  · Started on Ciprofloxacin and Metronidazole  · Calprotectin stool ordered  · Monitor H&H Q8H  · Blood cultures ordered  · Stool culture ordered  · Fecal leukocytes ordered  · Rotavirus antigen stool ordered  · SW and CM consulted      PT/OT evaluation: consulted  DVT prophylaxis: none  GI prophylaxis: Protonix  Diet: NPO  Disposition: up with assistance, on telemetry      Mert Posada MD, PGY-1  Attending physician: Dr. Darlin Nguyen

## 2021-09-22 NOTE — PROGRESS NOTES
[facility]  PROGRESS NOTE  ATTENDING NOTE    Left-sided abdominal pain noted for couple days. Patient also had bright red blood per rectum. Patient appears comfortable at this time. Patient's past surgical history noted. Patient has no risk factors for ischemic colitis that we are aware of. There is no family history of Crohn's disease or colitis.     /79   Pulse 66   Temp 97.7 °F (36.5 °C) (Infrared)   Resp 19   Ht 6' 2\" (1.88 m)   Wt 206 lb (93.4 kg)   SpO2 97%   BMI 26.45 kg/m²   Gen:  NAD  Abd:  Soft, mild distention, mild left-sided abdominal pain    ASSESSMENT/PLAN:  Ischemic colitis  --IV fluids  --N.p.o.  --IV antibiotics  --Plan for colonoscopy on Friday    Prieto Oscar MD, MSc, FACS  9/22/2021  1:12 PM

## 2021-09-22 NOTE — CONSULTS
GENERAL SURGERY  CONSULT NOTE  9/21/2021    Physician Consulted: Dr. Kriss Morris  Reason for Consult: Bright red blood per rectum  Referring Physician: Dr. Nidia Fields    HPI  Dean Fair is a 60 y.o. male who presents for evaluation of bright red blood per rectum. Patient states that this has been ongoing for the last 2 days intermittently. This morning it was associated with left lower quadrant abdominal tenderness. He states he is got no nausea no vomiting. He is mildly tender on exam.  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 He then has had multiple surgeries due to adhesions. Last surgery on record was 2019 which was a lap jannie. Patient had a colonoscopy secondary to previous childhood trauma. Because of that patient did not want another rectal exam performed today. Patient denies any fever/chills, nausea/vomiting. Does endorse decrease in appetite. History reviewed. No pertinent past medical history.     Past Surgical History:   Procedure Laterality Date    ABDOMEN SURGERY      1DAYS OLD    ABDOMINAL ADHESION SURGERY      APPENDECTOMY  1970    CHOLECYSTECTOMY, LAPAROSCOPIC N/A 1/25/2019    LAPAROSCOPIC CHOLECYSTECTOMY performed by Isael Shannon MD at [address] [place] [address]       Medications Prior to Admission:    Prior to Admission medications    Not on File       Allergies   Allergen Reactions    Asa [Aspirin] Anaphylaxis    Pcn [Penicillins] Anaphylaxis    Rye Grass Flower Pollen Extract [Gramineae Pollens]     Albuterol Sulfate Rash       Family History   Problem Relation Age of Onset    Breast Cancer Mother     Cancer Father     Kidney Disease Father        Social History     Tobacco Use    Smoking status: Never Smoker    Smokeless tobacco: Never Used   Vaping Use    Vaping Use: Never used   Substance Use Topics    Alcohol use: Yes     Comment: on occasion    Drug use: No         Review of Systems TECHNOLOGIST PROVIDED HISTORY: Reason for exam:->abdominal pain Additional Contrast?->None Decision Support Exception - unselect if not a suspected or confirmed emergency medical condition->Emergency Medical Condition (MA) What reading provider will be dictating this exam?->CRC FINDINGS: Lower Chest: Minimal subsegmental dependent atelectasis bilaterally. No effusion or infiltrate within the field of view. Organs: Cholecystectomy clips. Few scattered tiny too small to characterize focal hypodensities are seen within the liver, probably cysts. No worrisome focal hepatic lesion. No significant abnormality is identified of the spleen, pancreas, adrenal glands or right kidney. Possible small incidental parapelvic cyst or 2 within the lower pole of the left kidney, with the kidneys otherwise unremarkable. GI/Bowel: Unremarkable stomach. No acute specific small bowel abnormality is identified. On axial image 64, there is a small renal form nodular focus Gayland Gouge within 8 mm short axis lymph node at lateral right lateral margin of the duodenal bulb. Sagittal images similarly favor an abutting lymph node as opposed to exophytic wall nodule, not significantly intervally changed in size compared to 05/06/2019. The cecum and native ileocecal valve are present, however there appears to be a surgical ileocolic anastomosis within the right upper quadrant. The rectum is within normal limits for degree of under distension. There is abnormal edematous colonic wall thickening seen involving the descending colon and perhaps most proximal sigmoid colon, consistent with regional colitis. There is associated pericolic fat stranding, with trace fluid seen in the left pericolic gutter. No pneumatosis, abscess or evidence of bowel rupture. Pelvis: The bladder is suboptimally evaluated due to relative luminal collapse, however without bladder stones or findings to suggest cystitis.  The prostate gland is enlarged, measuring 4.8 x 3.8 cm transaxially. Seminal vesicles are symmetric, within normal limits. Peritoneum/Retroperitoneum: No free intraperitoneal air. No aortic aneurysm or acute vascular abnormality. Bones/Soft Tissues: No acute soft tissue abnormality is identified within the field of view. A small, uncomplicated fat containing right inguinal hernia is incidentally noted. No acute osseous abnormality. Findings consistent with regional colitis involving the descending and most proximal sigmoid colon. No pneumatosis, abscess or evidence of bowel rupture. Non-specific enlargement of the prostate gland, hyperplasia versus neoplasia, requiring clinical correlation. Other chronic/nonemergent findings, as described.          ASSESSMENT:  60 y.o. male with bright red blood per rectum, hemoglobin stable    PLAN:  Admit to medicine for observation  N.p.o., IV fluids  Small specks mixed with brown stool on rectal exam per ED physician  Monitor hemoglobin  Given left lower quadrant pain and bright red specks of blood believe this is a lower GI bleed  No acute endoscopic procedures planned at this time      Electronically signed by Mikie Chopra DO on 9/21/21 at 11:41 PM EDT

## 2021-09-22 NOTE — PROGRESS NOTES
[facility]  Internal Medicine Residency Program  Progress Note - House Team 1    Patient:  Maurice Kemp 60 y.o. male MRN: [id number]     Date of Service: 9/22/2021     CC: LLQ abdominal pain, bloody bowel movements  Overnight events: NAEO    Subjective     Patient seen and assessed at the bedside. NAEO. Patient states that he continues to experience stable left-sided abdominal pain that travels to the pubic region. He states that he has been able to use restroom frequently in the ED. On the first incidence, he noted blood. On the proceeding bowel movements, he only noted brown liquid. He denies any nausea or vomiting, or lightheadedness, dizziness, or worsening abdominal pain. No new complaints per the patient. Objective     · Vitals: /79   Pulse 66   Temp 97.7 °F (36.5 °C) (Infrared)   Resp 19   Ht 6' 2\" (1.88 m)   Wt 206 lb (93.4 kg)   SpO2 97%   BMI 26.45 kg/m²     · I & O - 24hr: No intake/output data recorded. Physical Exam  Constitutional:       General: He is not in acute distress. Appearance: Normal appearance. He is normal weight. He is not toxic-appearing. HENT:      Head: Normocephalic and atraumatic. Mouth/Throat:      Mouth: Mucous membranes are moist.      Pharynx: Oropharynx is clear. Eyes:      Extraocular Movements: Extraocular movements intact. Conjunctiva/sclera: Conjunctivae normal.      Pupils: Pupils are equal, round, and reactive to light. Cardiovascular:      Rate and Rhythm: Normal rate and regular rhythm. Heart sounds: Normal heart sounds. Pulmonary:      Effort: Pulmonary effort is normal.      Breath sounds: Normal breath sounds. Abdominal:      General: Bowel sounds are normal.      Palpations: Abdomen is soft. There is no mass. Tenderness: There is abdominal tenderness. There is no guarding. Comments: Patient endorses TTP on deep palpation of LUQ and LLQ.  Several scars from previous abdominal surgeries noted on inspection. Musculoskeletal:         General: Normal range of motion. Cervical back: Normal range of motion and neck supple. Skin:     General: Skin is warm and dry. Capillary Refill: Capillary refill takes less than 2 seconds. Neurological:      General: No focal deficit present. Mental Status: He is alert and oriented to person, place, and time.    Psychiatric:         Mood and Affect: Mood normal.     Subject  Pertinent Labs & Imaging Studies   yazan  CBC with Differential:    Lab Results   Component Value Date    WBC 14.0 09/22/2021    RBC 4.61 09/22/2021    HGB 13.2 09/22/2021    HCT 40.2 09/22/2021     09/22/2021    MCV 87.2 09/22/2021    MCH 28.6 09/22/2021    MCHC 32.8 09/22/2021    RDW 13.9 09/22/2021    SEGSPCT 68 09/15/2013    LYMPHOPCT 11.5 09/22/2021    MONOPCT 9.1 09/22/2021    BASOPCT 0.2 09/22/2021    MONOSABS 1.28 09/22/2021    LYMPHSABS 1.61 09/22/2021    EOSABS 0.26 09/22/2021    BASOSABS 0.03 09/22/2021     CMP:    Lab Results   Component Value Date     09/22/2021    K 3.9 09/22/2021     09/22/2021    CO2 22 09/22/2021    BUN 16 09/22/2021    CREATININE 0.9 09/22/2021    GFRAA >60 09/22/2021    LABGLOM >60 09/22/2021    GLUCOSE 83 09/22/2021    PROT 6.0 09/22/2021    LABALBU 3.5 09/22/2021    CALCIUM 7.9 09/22/2021    BILITOT 0.7 09/22/2021    ALKPHOS 89 09/22/2021    AST 28 09/22/2021    ALT 29 09/22/2021     Magnesium:    Lab Results   Component Value Date    MG 2.3 09/22/2021     Phosphorus:    Lab Results   Component Value Date    PHOS 2.5 09/22/2021     U/A:    Lab Results   Component Value Date    COLORU Yellow 09/21/2021    PROTEINU Negative 09/21/2021    PHUR SEE NOTE 09/21/2021    WBCUA NONE 09/21/2021    RBCUA 0-1 09/21/2021    RBCUA NONE 09/13/2013    MUCUS Present 10/22/2018    BACTERIA RARE 09/21/2021    CLARITYU Clear 09/21/2021    SPECGRAV SEE NOTE 09/21/2021    LEUKOCYTESUR SEE NOTE 09/21/2021    UROBILINOGEN 0.2 09/21/2021 BILIRUBINUR Negative 09/21/2021    BLOODU Negative 09/21/2021    GLUCOSEU Negative 09/21/2021    AMORPHOUS MODERATE 06/07/2021     Imaging Studies:    CT ABDOMEN PELVIS W IV CONTRAST Additional Contrast? None   Final Result   Findings consistent with regional colitis involving the descending and most   proximal sigmoid colon.       No pneumatosis, abscess or evidence of bowel rupture.       Non-specific enlargement of the prostate gland, hyperplasia versus neoplasia,   requiring clinical correlation.       Other chronic/nonemergent findings, as described. Resident's Assessment and Plan     Balaji Emerson is a 60 y.o. 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 1. Gastrointestinal Bleed 2/2 Diverticulosis vs. Malignancy vs. Ischemic Colitis  - Patient describes a 2-day history of maroon colored stools and bright red blood with clots. He endorses left-sided abdominal pain that radiates to the groin. He denies any previous incidence or history of inflammatory bowel disease.  - FOBT positive. Likely lower GI bleed. - No recent colonoscopy noted. Patient open to procedure if necessary to rule-out cause. - CT showed findings consistent with colitis of the descending and sigmoid colon. - Patient states last meal was on Sunday. Has been NPO, transition to clear liquid diet until colonoscopy prep. - No acute intervention by general surgery at this time. - Colonoscopy likely scheduled Friday per general surgery. - Continue MIVF LR 75 cc/hr. - Monitor H&H, patient has remained hemodynamically stable during stay. - Given concern for colitis, ciprofloxacin and metronidazole started. - Follow-up on calprotectin stool, stool cultures, fecal leukocytes, and rotavirus antigen. - Follow-up on blood cultures.     2. Leukocytosis  - Likely 2/2 colitis. - WBC at 14.0, from 11.1 the previous day. - Continue to monitor via CBC and follow-up on procalcitonin. 3. Full Code    PT/OT evaluation: Pending evaluation  DVT prophylaxis/ GI prophylaxis: None/Protonix  Disposition: Continue current care.     Mimi Vincent, M4  Attending physician: Dr. Yonas Murray DO

## 2021-09-22 NOTE — ED NOTES
Received report from [place], [address] [place] [address].        Katya Rodriguez RN  09/21/21 9285

## 2021-09-22 NOTE — ED PROVIDER NOTES
HPI:  9/21/21, Time: 8:14 PM EDT         Viraj Silverio is a 60 y.o. male presenting to the ED for abdominal pain with rectal bleeding, beginning yesterday ago. The complaint has been persistent, moderate in severity, and worsened by nothing. Patient reporting left lower abdominal pain. Patient reporting rectal bleeding that started yesterday reports several episodes of emesis. Patient reporting no hematemesis. He reports no chest pain he does report mild shortness of breath he reports no fever but he states he feels hot and cold. Patient reporting no headache. He denies any urinary symptoms    ROS:   Pertinent positives and negatives are stated within HPI, all other systems reviewed and are negative.  --------------------------------------------- PAST HISTORY ---------------------------------------------  Past Medical History:  has no past medical history on file. Past Surgical History:  has a past surgical history that includes Abdomen surgery; Appendectomy (1970); Abdominal adhesion surgery; and Cholecystectomy, laparoscopic (N/A, 1/25/2019). Social History:  reports that he has never smoked. He has never used smokeless tobacco. He reports current alcohol use. He reports that he does not use drugs. Family History: family history includes Breast Cancer in his mother; Cancer in his father; Kidney Disease in his father. The patients home medications have been reviewed.     Allergies: Asa [aspirin], Pcn [penicillins], Rye grass flower pollen extract [gramineae pollens], and Albuterol sulfate    ---------------------------------------------------PHYSICAL EXAM--------------------------------------    Constitutional/General: Alert and oriented x3, well appearing, non toxic in NAD  Head: Normocephalic and atraumatic  Eyes: PERRL, EOMI  Mouth: Oropharynx clear, handling secretions, no trismus  Neck: Supple, full ROM, non tender to palpation in the midline, no stridor, no crepitus, no meningeal signs  Pulmonary: Lungs clear to auscultation bilaterally, no wheezes, rales, or rhonchi. Not in respiratory distress  Cardiovascular:  Regular rate. Regular rhythm. No murmurs, gallops, or rubs. 2+ distal pulses  Chest: no chest wall tenderness  Abdomen: Soft. Tender left lower abdomen. Non distended. +BS. No rebound, guarding, or rigidity. No pulsatile masses appreciated. Musculoskeletal: Moves all extremities x 4. Warm and well perfused, no clubbing, cyanosis, or edema. Capillary refill <3 seconds  Skin: warm and dry. No rashes. Neurologic: GCS 15, CN 2-12 grossly intact, no focal deficits, symmetric strength 5/5 in the upper and lower extremities bilaterally  Psych: Normal Affect    -------------------------------------------------- RESULTS -------------------------------------------------  I have personally reviewed all laboratory and imaging results for this patient. Results are listed below.      LABS:  Results for orders placed or performed during the hospital encounter of 09/21/21   CBC Auto Differential   Result Value Ref Range    WBC 11.1 4.5 - 11.5 E9/L    RBC 4.90 3.80 - 5.80 E12/L    Hemoglobin 13.9 12.5 - 16.5 g/dL    Hematocrit 42.4 37.0 - 54.0 %    MCV 86.5 80.0 - 99.9 fL    MCH 28.4 26.0 - 35.0 pg    MCHC 32.8 32.0 - 34.5 %    RDW 14.0 11.5 - 15.0 fL    Platelets 652 072 - 834 E9/L    MPV 10.8 7.0 - 12.0 fL    Neutrophils % 75.3 43.0 - 80.0 %    Immature Granulocytes % 0.4 0.0 - 5.0 %    Lymphocytes % 14.4 (L) 20.0 - 42.0 %    Monocytes % 8.5 2.0 - 12.0 %    Eosinophils % 1.1 0.0 - 6.0 %    Basophils % 0.3 0.0 - 2.0 %    Neutrophils Absolute 8.36 (H) 1.80 - 7.30 E9/L    Immature Granulocytes # 0.04 E9/L    Lymphocytes Absolute 1.59 1.50 - 4.00 E9/L    Monocytes Absolute 0.94 0.10 - 0.95 E9/L    Eosinophils Absolute 0.12 0.05 - 0.50 E9/L    Basophils Absolute 0.03 0.00 - 0.20 E9/L   Comprehensive Metabolic Panel   Result Value Ref Range    Sodium 138 132 - 146 mmol/L    Potassium 4.3 3.5 - 5.0 mmol/L    Chloride 104 98 - 107 mmol/L    CO2 25 22 - 29 mmol/L    Anion Gap 9 7 - 16 mmol/L    Glucose 96 74 - 99 mg/dL    BUN 14 6 - 20 mg/dL    CREATININE 0.9 0.7 - 1.2 mg/dL    GFR Non-African American >60 >=60 mL/min/1.73    GFR African American >60     Calcium 8.9 8.6 - 10.2 mg/dL    Total Protein 6.8 6.4 - 8.3 g/dL    Albumin 4.0 3.5 - 5.2 g/dL    Total Bilirubin 0.5 0.0 - 1.2 mg/dL    Alkaline Phosphatase 83 40 - 129 U/L    ALT 35 0 - 40 U/L    AST 30 0 - 39 U/L   Lactic Acid, Plasma   Result Value Ref Range    Lactic Acid 0.9 0.5 - 2.2 mmol/L   Lactic Acid, Plasma   Result Value Ref Range    Lactic Acid 0.9 0.5 - 2.2 mmol/L   Lipase   Result Value Ref Range    Lipase 22 13 - 60 U/L   Urinalysis   Result Value Ref Range    Color, UA Yellow Straw/Yellow    Clarity, UA Clear Clear    Glucose, Ur Negative Negative mg/dL    Bilirubin Urine Negative Negative    Ketones, Urine Negative Negative mg/dL    Specific Gravity, UA SEE NOTE 1.005 - 1.030    Blood, Urine Negative Negative    pH, UA SEE NOTE 5.0 - 9.0    Protein, UA Negative Negative mg/dL    Urobilinogen, Urine 0.2 <2.0 E.U./dL    Nitrite, Urine Negative Negative    Leukocyte Esterase, Urine SEE NOTE Negative   Microscopic Urinalysis   Result Value Ref Range    WBC, UA NONE 0 - 5 /HPF    RBC, UA 0-1 0 - 2 /HPF    Epithelial Cells, UA NONE SEEN /HPF    Bacteria, UA RARE (A) None Seen /HPF       RADIOLOGY:  Interpreted by Radiologist.  CT ABDOMEN PELVIS W IV CONTRAST Additional Contrast? None   Final Result   Findings consistent with regional colitis involving the descending and most   proximal sigmoid colon. No pneumatosis, abscess or evidence of bowel rupture. Non-specific enlargement of the prostate gland, hyperplasia versus neoplasia,   requiring clinical correlation.       Other chronic/nonemergent findings, as described.                   ------------------------- NURSING NOTES AND VITALS REVIEWED ---------------------------   The nursing pain        DISPOSITION  Disposition: Admit  Patient condition is stable        NOTE: This report was transcribed using voice recognition software.  Every effort was made to ensure accuracy; however, inadvertent computerized transcription errors may be present          Fatimah Moreno MD  09/21/21 2015       Fatimah Moreno MD  09/21/21 0902

## 2021-09-23 ENCOUNTER — ANESTHESIA EVENT (OUTPATIENT)
Dept: ENDOSCOPY | Age: 56
DRG: 246 | End: 2021-09-23
Payer: MEDICAID

## 2021-09-23 LAB
ALBUMIN SERPL-MCNC: 3.6 G/DL (ref 3.5–5.2)
ALP BLD-CCNC: 97 U/L (ref 40–129)
ALT SERPL-CCNC: 29 U/L (ref 0–40)
ANION GAP SERPL CALCULATED.3IONS-SCNC: 11 MMOL/L (ref 7–16)
AST SERPL-CCNC: 25 U/L (ref 0–39)
BASOPHILS ABSOLUTE: 0.04 E9/L (ref 0–0.2)
BASOPHILS RELATIVE PERCENT: 0.3 % (ref 0–2)
BILIRUB SERPL-MCNC: 0.5 MG/DL (ref 0–1.2)
BUN BLDV-MCNC: 13 MG/DL (ref 6–20)
CALCIUM SERPL-MCNC: 8.3 MG/DL (ref 8.6–10.2)
CHLORIDE BLD-SCNC: 106 MMOL/L (ref 98–107)
CHOLESTEROL, TOTAL: 169 MG/DL (ref 0–199)
CO2: 24 MMOL/L (ref 22–29)
CREAT SERPL-MCNC: 0.9 MG/DL (ref 0.7–1.2)
EOSINOPHILS ABSOLUTE: 0.46 E9/L (ref 0.05–0.5)
EOSINOPHILS RELATIVE PERCENT: 4 % (ref 0–6)
GFR AFRICAN AMERICAN: >60
GFR NON-AFRICAN AMERICAN: >60 ML/MIN/1.73
GLUCOSE BLD-MCNC: 82 MG/DL (ref 74–99)
HCT VFR BLD CALC: 40.4 % (ref 37–54)
HDLC SERPL-MCNC: 33 MG/DL
HEMOGLOBIN: 13.5 G/DL (ref 12.5–16.5)
IMMATURE GRANULOCYTES #: 0.05 E9/L
IMMATURE GRANULOCYTES %: 0.4 % (ref 0–5)
LDL CHOLESTEROL CALCULATED: 119 MG/DL (ref 0–99)
LYMPHOCYTES ABSOLUTE: 1.95 E9/L (ref 1.5–4)
LYMPHOCYTES RELATIVE PERCENT: 16.8 % (ref 20–42)
MAGNESIUM: 2 MG/DL (ref 1.6–2.6)
MCH RBC QN AUTO: 29 PG (ref 26–35)
MCHC RBC AUTO-ENTMCNC: 33.4 % (ref 32–34.5)
MCV RBC AUTO: 86.7 FL (ref 80–99.9)
MONOCYTES ABSOLUTE: 0.97 E9/L (ref 0.1–0.95)
MONOCYTES RELATIVE PERCENT: 8.4 % (ref 2–12)
NEUTROPHILS ABSOLUTE: 8.11 E9/L (ref 1.8–7.3)
NEUTROPHILS RELATIVE PERCENT: 70.1 % (ref 43–80)
PDW BLD-RTO: 13.4 FL (ref 11.5–15)
PHOSPHORUS: 2.4 MG/DL (ref 2.5–4.5)
PLATELET # BLD: 240 E9/L (ref 130–450)
PMV BLD AUTO: 10.9 FL (ref 7–12)
POTASSIUM SERPL-SCNC: 3.7 MMOL/L (ref 3.5–5)
RBC # BLD: 4.66 E12/L (ref 3.8–5.8)
SODIUM BLD-SCNC: 141 MMOL/L (ref 132–146)
TOTAL PROTEIN: 6.6 G/DL (ref 6.4–8.3)
TRIGL SERPL-MCNC: 83 MG/DL (ref 0–149)
VLDLC SERPL CALC-MCNC: 17 MG/DL
WBC # BLD: 11.6 E9/L (ref 4.5–11.5)
WHITE BLOOD CELLS (WBC), STOOL: NORMAL

## 2021-09-23 PROCEDURE — 2580000003 HC RX 258: Performed by: INTERNAL MEDICINE

## 2021-09-23 PROCEDURE — 85025 COMPLETE CBC W/AUTO DIFF WBC: CPT

## 2021-09-23 PROCEDURE — 83735 ASSAY OF MAGNESIUM: CPT

## 2021-09-23 PROCEDURE — 97165 OT EVAL LOW COMPLEX 30 MIN: CPT

## 2021-09-23 PROCEDURE — 96375 TX/PRO/DX INJ NEW DRUG ADDON: CPT

## 2021-09-23 PROCEDURE — 96368 THER/DIAG CONCURRENT INF: CPT

## 2021-09-23 PROCEDURE — 2500000003 HC RX 250 WO HCPCS: Performed by: INTERNAL MEDICINE

## 2021-09-23 PROCEDURE — 6360000002 HC RX W HCPCS: Performed by: INTERNAL MEDICINE

## 2021-09-23 PROCEDURE — G0378 HOSPITAL OBSERVATION PER HR: HCPCS

## 2021-09-23 PROCEDURE — 80061 LIPID PANEL: CPT

## 2021-09-23 PROCEDURE — 84100 ASSAY OF PHOSPHORUS: CPT

## 2021-09-23 PROCEDURE — 96366 THER/PROPH/DIAG IV INF ADDON: CPT

## 2021-09-23 PROCEDURE — 6370000000 HC RX 637 (ALT 250 FOR IP): Performed by: STUDENT IN AN ORGANIZED HEALTH CARE EDUCATION/TRAINING PROGRAM

## 2021-09-23 PROCEDURE — 1200000000 HC SEMI PRIVATE

## 2021-09-23 PROCEDURE — 36415 COLL VENOUS BLD VENIPUNCTURE: CPT

## 2021-09-23 PROCEDURE — 99232 SBSQ HOSP IP/OBS MODERATE 35: CPT | Performed by: SURGERY

## 2021-09-23 PROCEDURE — 80053 COMPREHEN METABOLIC PANEL: CPT

## 2021-09-23 PROCEDURE — 99231 SBSQ HOSP IP/OBS SF/LOW 25: CPT | Performed by: INTERNAL MEDICINE

## 2021-09-23 PROCEDURE — C9113 INJ PANTOPRAZOLE SODIUM, VIA: HCPCS | Performed by: INTERNAL MEDICINE

## 2021-09-23 RX ADMIN — CIPROFLOXACIN 400 MG: 2 INJECTION INTRAVENOUS at 03:15

## 2021-09-23 RX ADMIN — POTASSIUM PHOSPHATE, MONOBASIC AND POTASSIUM PHOSPHATE, DIBASIC 20 MMOL: 224; 236 INJECTION, SOLUTION, CONCENTRATE INTRAVENOUS at 08:59

## 2021-09-23 RX ADMIN — SODIUM CHLORIDE 10 ML: 9 INJECTION, SOLUTION INTRAMUSCULAR; INTRAVENOUS; SUBCUTANEOUS at 08:59

## 2021-09-23 RX ADMIN — CIPROFLOXACIN 400 MG: 2 INJECTION INTRAVENOUS at 17:14

## 2021-09-23 RX ADMIN — METRONIDAZOLE 500 MG: 500 INJECTION, SOLUTION INTRAVENOUS at 03:16

## 2021-09-23 RX ADMIN — POLYETHYLENE GLYCOL 3350, SODIUM SULFATE ANHYDROUS, SODIUM BICARBONATE, SODIUM CHLORIDE, POTASSIUM CHLORIDE 4000 ML: 236; 22.74; 6.74; 5.86; 2.97 POWDER, FOR SOLUTION ORAL at 09:09

## 2021-09-23 RX ADMIN — METRONIDAZOLE 500 MG: 500 INJECTION, SOLUTION INTRAVENOUS at 13:39

## 2021-09-23 RX ADMIN — METRONIDAZOLE 500 MG: 500 INJECTION, SOLUTION INTRAVENOUS at 18:41

## 2021-09-23 RX ADMIN — PANTOPRAZOLE SODIUM 40 MG: 40 INJECTION, POWDER, FOR SOLUTION INTRAVENOUS at 08:59

## 2021-09-23 RX ADMIN — Medication 10 ML: at 08:58

## 2021-09-23 ASSESSMENT — PAIN SCALES - GENERAL
PAINLEVEL_OUTOF10: 0

## 2021-09-23 NOTE — PROGRESS NOTES
General: Normal range of motion. Cervical back: Normal range of motion and neck supple. Skin:     General: Skin is warm and dry. Capillary Refill: Capillary refill takes less than 2 seconds. Neurological:      General: No focal deficit present. Mental Status: He is alert and oriented to person, place, and time. Mental status is at baseline.    Psychiatric:         Mood and Affect: Mood normal.       Pertinent Labs & Imaging Studies   yazan  CBC with Differential:    Lab Results   Component Value Date    WBC 11.6 09/23/2021    RBC 4.66 09/23/2021    HGB 13.5 09/23/2021    HCT 40.4 09/23/2021     09/23/2021    MCV 86.7 09/23/2021    MCH 29.0 09/23/2021    MCHC 33.4 09/23/2021    RDW 13.4 09/23/2021    SEGSPCT 68 09/15/2013    LYMPHOPCT 16.8 09/23/2021    MONOPCT 8.4 09/23/2021    BASOPCT 0.3 09/23/2021    MONOSABS 0.97 09/23/2021    LYMPHSABS 1.95 09/23/2021    EOSABS 0.46 09/23/2021    BASOSABS 0.04 09/23/2021     CMP:    Lab Results   Component Value Date     09/23/2021    K 3.7 09/23/2021     09/23/2021    CO2 24 09/23/2021    BUN 13 09/23/2021    CREATININE 0.9 09/23/2021    GFRAA >60 09/23/2021    LABGLOM >60 09/23/2021    GLUCOSE 82 09/23/2021    PROT 6.6 09/23/2021    LABALBU 3.6 09/23/2021    CALCIUM 8.3 09/23/2021    BILITOT 0.5 09/23/2021    ALKPHOS 97 09/23/2021    AST 25 09/23/2021    ALT 29 09/23/2021     Albumin:    Lab Results   Component Value Date    LABALBU 3.6 09/23/2021     Magnesium:    Lab Results   Component Value Date    MG 2.0 09/23/2021     Phosphorus:    Lab Results   Component Value Date    PHOS 2.4 09/23/2021     Imaging Studies:     CT ABDOMEN PELVIS W IV CONTRAST Additional Contrast? None   Final Result   Findings consistent with regional colitis involving the descending and most   proximal sigmoid colon.       No pneumatosis, abscess or evidence of bowel rupture.       Non-specific enlargement of the prostate gland, hyperplasia versus neoplasia, requiring clinical correlation.       Other chronic/nonemergent findings, as described. Resident's Assessment and Plan     Analisa Joyce is a 60 y.o. 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     1. Gastrointestinal Bleed 2/2 Diverticulosis vs. Malignancy vs. Ischemic Colitis  - Patient describes a 2-day history of maroon colored stools and bright red blood with clots. He endorses left-sided abdominal pain that radiates to the groin. He denies any previous incidence or history of inflammatory bowel disease.  - FOBT positive. Likely lower GI bleed. - No recent colonoscopy noted. Patient open to procedure if necessary to rule-out cause. - CT showed findings consistent with colitis of the descending and sigmoid colon. - No acute intervention by general surgery at this time. - Denies bloody stool since 9/22 morning. Continues to experience diarrhea, yellow-brown fluid stools. - Maintain clear liquid diet until midnight, then transition to NPO.  - Colonoscopy likely scheduled Friday per general surgery. - Continue MIVF LR 75 cc/hr. - Monitor H&H, patient has remained hemodynamically stable during stay. Today, hgb 12.7.  - Given concern for colitis, ciprofloxacin and metronidazole continued. - Follow-up on calprotectin stool, stool cultures with final results. Culture preliminarily shows only gram-positive enteric pete. - Stool negative for WBC's. - Rotavirus antigen negative. - Follow-up on blood cultures.     2. Leukocytosis  - Likely 2/2 colitis. - WBC at 11.6, from 14.0 the previous day. - Continue to monitor via CBC and follow-up on procalcitonin.     3.  Full Code     PT/OT evaluation: Pending evaluation  DVT prophylaxis/ GI prophylaxis: None/Protonix  Disposition: Continue current care.     Cristopher Voss M4  Attending physician: Dr. Joslyn Chamorro

## 2021-09-23 NOTE — PROGRESS NOTES
[facility]  PROGRESS NOTE  ATTENDING NOTE    Left-sided abdominal pain noted for couple days. Patient also had bright red blood per rectum. Patient appears comfortable at this time. Patient's past surgical history noted. Patient has no risk factors for ischemic colitis that we are aware of. There is no family history of Crohn's disease or colitis. Pain improved today, tolerating prep    /85   Pulse 53   Temp 97.4 °F (36.3 °C) (Oral)   Resp 18   Ht 6' 2\" (1.88 m)   Wt 206 lb (93.4 kg)   SpO2 99%   BMI 26.45 kg/m²   Gen:  NAD  Abd:  Soft, non-distention, mild left-sided abdominal pain    ASSESSMENT/PLAN:  Ischemic colitis  --IV fluids  --Clears.   --IV antibiotics  --Plan for colonoscopy on Friday    Ed York MD, MSc, FACS  9/23/2021  5:25 PM

## 2021-09-23 NOTE — PLAN OF CARE
Problem: Pain:  Goal: Pain level will decrease  Description: Pain level will decrease  9/23/2021 1022 by Kota Thompson RN  Outcome: Met This Shift  9/23/2021 0107 by Sanju Woody RN  Outcome: Met This Shift  Goal: Control of acute pain  Description: Control of acute pain  9/23/2021 1022 by Kota Thompson RN  Outcome: Met This Shift  9/23/2021 0107 by Sanju Woody RN  Outcome: Met This Shift  Goal: Control of chronic pain  Description: Control of chronic pain  9/23/2021 1022 by Kota Thompson RN  Outcome: Met This Shift  9/23/2021 0107 by Sanju Woody RN  Outcome: Met This Shift     Problem: Bleeding:  Goal: Will show no signs and symptoms of excessive bleeding  Description: Will show no signs and symptoms of excessive bleeding  9/23/2021 1022 by Kota Thompson RN  Outcome: Met This Shift  9/23/2021 0107 by Sanju Woody RN  Outcome: Ongoing     Problem: Physical Regulation:  Goal: Ability to maintain vital signs within normal range will improve  Description: Ability to maintain vital signs within normal range will improve  Outcome: Met This Shift     Problem: Respiratory:  Goal: Ability to maintain normal respiratory secretions will improve  Description: Ability to maintain normal respiratory secretions will improve  Outcome: Met This Shift     Problem: Skin Integrity:  Goal: Demonstration of wound healing without infection will improve  Description: Demonstration of wound healing without infection will improve  Outcome: Met This Shift  Goal: Complications related to intravenous access or infusion will be avoided or minimized  Description: Complications related to intravenous access or infusion will be avoided or minimized  Outcome: Met This Shift

## 2021-09-23 NOTE — PROGRESS NOTES
Occupational Therapy  OCCUPATIONAL THERAPY INITIAL EVALUATION    [facility] [place] [address] [address]    Date:2021                                                 Patient Name: Mk Hutton  MRN: [id number]  : 1965  Room: [address] [place] [address]    Evaluating OT: Juan Parmar OTR/L #[id number]  Referring Sun Nolen DO  Specific Provider Orders: OT eval and treat; 21    Diagnosis: Rectal bleeding [K62.5]  GI bleed [K92.2]  Left lower quadrant abdominal pain [R10.32]    Surgery/Procedure:  none  Pertinent Medical History:    History reviewed. No pertinent past medical history. 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    Pain Level: Pt reports L sided abdominal pain this session, educated pt on pain management     Cognition: A&O: 4/4; Follows 2 step directions   Memory:  good   Sequencing:  good   Problem solving:  good   Judgement/safety:  good     Functional Assessment:  AM-PAC Daily Activity Raw Score: 24/24   Initial Eval Status  Date: 21 Treatment Status  Date: STGs = LTGs  Time frame: n/a   Feeding Independent      Grooming Independent      UB Dressing Independent      LB Dressing Independent      Bathing Independent     Toileting Independent      Bed Mobility  Supine to sit:  Independent   Sit to supine: Independent      Functional Transfers Independent      Functional Mobility Independent      Balance Sitting:     Dynamic: Indep  Standing: Indep     Activity Tolerance good     Visual/  Perceptual Glasses: Yes    WFL                Hand Dominance R AROM (PROM) Strength Additional Info:    RUE  WFL 5/5 good  and wfl FMC/dexterity noted during ADL tasks       LUE WFL 5/5 good  and wfl FMC/dexterity noted during ADL tasks       Hearing: WFL   Sensation:  No c/o numbness or tingling   Tone: WFL   Edema: none noted    Comments: Obtained nursing clearance prior to session. Upon arrival patient lying in bed. Pt demonstrating good understanding of education/techniques, requiring no additional training / education for increased independence for ADL completion. At end of session, patient lying in bed with call light and phone within reach, all lines and tubes intact. Pt instructed on use of call light for assistance and fall prevention. Line management and environmental modifications made prior to and end of session to ensure patient safety and to increase efficiency of session. Skilled monitoring of HR, O2 saturation, blood pressure and patient's response to activity performed throughout session. Overall pt demonstrated independence and good safety during completion of ADL/functional transfers/mobility tasks. Pt reports at his baseline and is independent in ADLs. Pt will be taken off of OT caseload. Thank you. Patient and/or family were instructed on functional diagnosis, prognosis/goals and OT plan of care. Demonstrated good understanding.      Eval Complexity: Low    Time In: 7:35  Time Out: 7:55  Total Treatment Time: eval only    Min Units   OT Eval Low 97165  x  1   OT Eval Medium [id number]      OT Eval High 27782       OT Re-Eval [id number]       Therapeutic Ex [id number]       Therapeutic Activities 83440       ADL/Self Care 37020       Orthotic Management 03687       Neuro Re-Ed 26618       Non-Billable Time          Evaluation Time includes thorough review of current medical information, gathering information on past medical history/social history and prior level of function, completion of standardized testing/informal observation of tasks, assessment of data and education on plan of care and goals.             Bety Jones, OTR/L #[id number]

## 2021-09-23 NOTE — PROGRESS NOTES
[address]  Internal Medicine Residency Program  Progress Note - House Team     Patient:  Gunnar Bean 60 y.o. male MRN: [id number]     Date of Service: 9/23/2021     CC: LLQ pain, w/ bloody bowel movement  Overnight events: No acute events    Subjective     Patient was seen and examined this morning at bedside in no acute distress. Overnight, patient admits to no acute events. Patient seen this AM.  Patient states that his lower abdominal pain is improving since yesterday. Patient states that yesterday his pain was 9/10 but today his pain is now lower to 2/10. Patient states that he has not had a bloody bowel movement since admission. Patient admits to ongoing diarrhea that is yellow in color. Patient denies any fevers, chills, shortness of breath, lightheadedness, dizziness, or worsening abdominal pain. Objective     Physical Exam:  · Vitals: /77   Pulse 55   Temp 97.5 °F (36.4 °C) (Oral)   Resp 16   Ht 6' 2\" (1.88 m)   Wt 206 lb (93.4 kg)   SpO2 97%   BMI 26.45 kg/m²     · I & O - 24hr: No intake/output data recorded. · General Appearance: alert, appears stated age and cooperative  · HEENT:  Head: Normocephalic, no lesions, without obvious abnormality. · Neck: no adenopathy, no carotid bruit, no JVD, supple, symmetrical, trachea midline and thyroid not enlarged, symmetric, no tenderness/mass/nodules  · Lung: clear to auscultation bilaterally  · Heart: regular rate and rhythm, S1, S2 normal, no murmur, click, rub or gallop  · Abdomen: soft, non-tender; bowel sounds normal; no masses,  no organomegaly and Previous surgical scar lesions present across abdomen. Tenderness to palpation of left lower quadrant (+). Improving since yesterday. · Extremities:  extremities normal, atraumatic, no cyanosis or edema  · Musculokeletal: No joint swelling, no muscle tenderness. ROM normal in all joints of extremities.    · Neurologic: Mental status: Alert, oriented, thought content appropriate  Subject  Pertinent Labs & Imaging Studies   yazan  CBC with Differential:    Lab Results   Component Value Date    WBC 11.6 09/23/2021    RBC 4.66 09/23/2021    HGB 13.5 09/23/2021    HCT 40.4 09/23/2021     09/23/2021    MCV 86.7 09/23/2021    MCH 29.0 09/23/2021    MCHC 33.4 09/23/2021    RDW 13.4 09/23/2021    SEGSPCT 68 09/15/2013    LYMPHOPCT 16.8 09/23/2021    MONOPCT 8.4 09/23/2021    BASOPCT 0.3 09/23/2021    MONOSABS 0.97 09/23/2021    LYMPHSABS 1.95 09/23/2021    EOSABS 0.46 09/23/2021    BASOSABS 0.04 09/23/2021     CMP:    Lab Results   Component Value Date     09/23/2021    K 3.7 09/23/2021     09/23/2021    CO2 24 09/23/2021    BUN 13 09/23/2021    CREATININE 0.9 09/23/2021    GFRAA >60 09/23/2021    LABGLOM >60 09/23/2021    GLUCOSE 82 09/23/2021    PROT 6.6 09/23/2021    LABALBU 3.6 09/23/2021    CALCIUM 8.3 09/23/2021    BILITOT 0.5 09/23/2021    ALKPHOS 97 09/23/2021    AST 25 09/23/2021    ALT 29 09/23/2021       CT ABDOMEN PELVIS W IV CONTRAST Additional Contrast? None   Final Result   Findings consistent with regional colitis involving the descending and most   proximal sigmoid colon. No pneumatosis, abscess or evidence of bowel rupture. Non-specific enlargement of the prostate gland, hyperplasia versus neoplasia,   requiring clinical correlation. Other chronic/nonemergent findings, as described. Resident's Assessment and Plan     Assessment and Plan:  The patient is a 60 y.o. male with a past surgical history of appendectomy, abdominal adhesion surgery and cholecystectomy who present to the ED due to LLQ abdominal pain and rectal bleeding for 2 days    1. Acute GI Bleed 2/2 Diverticulosis vs Malignancy vs Ischemic colitis  a. Pt admits to 2 day history of maroon-colored stools and bright red blood per rectum with associated clots. b. Patient admits to left-sided outer pain radiates to groin.   Patient denies any previous history of IBD.  c. FOBT positive  d. Patient has no recent history of colonoscopy. Patient admits willingness to undergo colonoscopy if necessary to rule out underlying cause. e. CT abdomen pelvis (9/21/2021): \" Showed findings consistent with regional colitis involving the descending and most proximal sigmoid colon. No evidence of bowel rupture. f. Per GS, plan for colonoscopy on 9/24/2021. Patient admits compliance with plan  g. Continue LR 75 cc/hr  h. Continue to monitor H&H  i. Continue Cipro and Flagyl (D3)  j. Calprotectin stool, stool cultures, blood culture, fecal leukocyte: Pending  k. Rotavirus negative  l. Continue patient on clear liquid diet, n.p.o. after midnight    2. Leukocytosis 2/2 likely Colitis, ischemic vs infectious  a. WBC: 7.6 on admission -> 14.0 -> 3.6  b.  Continue to monitor  c. Procalcitonin: 0.10      PT/OT evaluation:   DVT prophylaxis/ GI prophylaxis: None/Protonix  Disposition: continue current care     Beckie Chacon MD, PGY-1  Attending physician: Dr. Davon Oliver

## 2021-09-23 NOTE — CARE COORDINATION
Social Work Discharge/Planning:    Patient is going for a colonoscopy tomorrow. SW met with AOx4 independent patient to explain role and follow up transition of care discussion. Patient reports plan remains home with no anticipated needs. Patient's fiance' is able to transport him home day of discharge or he could walk home due to living 2 blocks away from this hospital.    SHARON/HALEY to follow.      NANDINI Lindquist  [phone]

## 2021-09-24 ENCOUNTER — APPOINTMENT (OUTPATIENT)
Dept: GENERAL RADIOLOGY | Age: 56
DRG: 246 | End: 2021-09-24
Payer: MEDICAID

## 2021-09-24 ENCOUNTER — ANESTHESIA (OUTPATIENT)
Dept: ENDOSCOPY | Age: 56
DRG: 246 | End: 2021-09-24
Payer: MEDICAID

## 2021-09-24 VITALS — DIASTOLIC BLOOD PRESSURE: 55 MMHG | SYSTOLIC BLOOD PRESSURE: 106 MMHG | OXYGEN SATURATION: 91 %

## 2021-09-24 LAB
ALBUMIN SERPL-MCNC: 3.4 G/DL (ref 3.5–5.2)
ALP BLD-CCNC: 95 U/L (ref 40–129)
ALT SERPL-CCNC: 23 U/L (ref 0–40)
ANION GAP SERPL CALCULATED.3IONS-SCNC: 11 MMOL/L (ref 7–16)
AST SERPL-CCNC: 19 U/L (ref 0–39)
BASOPHILS ABSOLUTE: 0.03 E9/L (ref 0–0.2)
BASOPHILS RELATIVE PERCENT: 0.4 % (ref 0–2)
BILIRUB SERPL-MCNC: 0.3 MG/DL (ref 0–1.2)
BUN BLDV-MCNC: 8 MG/DL (ref 6–20)
CALCIUM SERPL-MCNC: 8.2 MG/DL (ref 8.6–10.2)
CHLORIDE BLD-SCNC: 106 MMOL/L (ref 98–107)
CO2: 23 MMOL/L (ref 22–29)
CREAT SERPL-MCNC: 0.9 MG/DL (ref 0.7–1.2)
CULTURE, STOOL: NORMAL
EKG ATRIAL RATE: 54 BPM
EKG P AXIS: 6 DEGREES
EKG P-R INTERVAL: 142 MS
EKG Q-T INTERVAL: 444 MS
EKG QRS DURATION: 98 MS
EKG QTC CALCULATION (BAZETT): 421 MS
EKG R AXIS: -9 DEGREES
EKG T AXIS: -5 DEGREES
EKG VENTRICULAR RATE: 54 BPM
EOSINOPHILS ABSOLUTE: 0.38 E9/L (ref 0.05–0.5)
EOSINOPHILS RELATIVE PERCENT: 5.6 % (ref 0–6)
GFR AFRICAN AMERICAN: >60
GFR NON-AFRICAN AMERICAN: >60 ML/MIN/1.73
GLUCOSE BLD-MCNC: 81 MG/DL (ref 74–99)
HCT VFR BLD CALC: 36.7 % (ref 37–54)
HEMOGLOBIN: 12.4 G/DL (ref 12.5–16.5)
IMMATURE GRANULOCYTES #: 0.02 E9/L
IMMATURE GRANULOCYTES %: 0.3 % (ref 0–5)
LYMPHOCYTES ABSOLUTE: 1.47 E9/L (ref 1.5–4)
LYMPHOCYTES RELATIVE PERCENT: 21.6 % (ref 20–42)
MAGNESIUM: 2.1 MG/DL (ref 1.6–2.6)
MCH RBC QN AUTO: 28.3 PG (ref 26–35)
MCHC RBC AUTO-ENTMCNC: 33.8 % (ref 32–34.5)
MCV RBC AUTO: 83.8 FL (ref 80–99.9)
MONOCYTES ABSOLUTE: 0.52 E9/L (ref 0.1–0.95)
MONOCYTES RELATIVE PERCENT: 7.6 % (ref 2–12)
NEUTROPHILS ABSOLUTE: 4.4 E9/L (ref 1.8–7.3)
NEUTROPHILS RELATIVE PERCENT: 64.5 % (ref 43–80)
PDW BLD-RTO: 13.1 FL (ref 11.5–15)
PHOSPHORUS: 2.7 MG/DL (ref 2.5–4.5)
PLATELET # BLD: 229 E9/L (ref 130–450)
PMV BLD AUTO: 11.3 FL (ref 7–12)
POTASSIUM SERPL-SCNC: 3.8 MMOL/L (ref 3.5–5)
RBC # BLD: 4.38 E12/L (ref 3.8–5.8)
SODIUM BLD-SCNC: 140 MMOL/L (ref 132–146)
TOTAL PROTEIN: 5.7 G/DL (ref 6.4–8.3)
WBC # BLD: 6.8 E9/L (ref 4.5–11.5)

## 2021-09-24 PROCEDURE — 45380 COLONOSCOPY AND BIOPSY: CPT | Performed by: SURGERY

## 2021-09-24 PROCEDURE — 3700000001 HC ADD 15 MINUTES (ANESTHESIA): Performed by: SURGERY

## 2021-09-24 PROCEDURE — 6360000002 HC RX W HCPCS: Performed by: INTERNAL MEDICINE

## 2021-09-24 PROCEDURE — 96375 TX/PRO/DX INJ NEW DRUG ADDON: CPT

## 2021-09-24 PROCEDURE — 7100000000 HC PACU RECOVERY - FIRST 15 MIN: Performed by: SURGERY

## 2021-09-24 PROCEDURE — 99231 SBSQ HOSP IP/OBS SF/LOW 25: CPT | Performed by: INTERNAL MEDICINE

## 2021-09-24 PROCEDURE — C9113 INJ PANTOPRAZOLE SODIUM, VIA: HCPCS | Performed by: INTERNAL MEDICINE

## 2021-09-24 PROCEDURE — 36415 COLL VENOUS BLD VENIPUNCTURE: CPT

## 2021-09-24 PROCEDURE — 7100000001 HC PACU RECOVERY - ADDTL 15 MIN: Performed by: SURGERY

## 2021-09-24 PROCEDURE — 83735 ASSAY OF MAGNESIUM: CPT

## 2021-09-24 PROCEDURE — 2500000003 HC RX 250 WO HCPCS: Performed by: STUDENT IN AN ORGANIZED HEALTH CARE EDUCATION/TRAINING PROGRAM

## 2021-09-24 PROCEDURE — 2709999900 HC NON-CHARGEABLE SUPPLY: Performed by: SURGERY

## 2021-09-24 PROCEDURE — 2580000003 HC RX 258

## 2021-09-24 PROCEDURE — 6360000002 HC RX W HCPCS

## 2021-09-24 PROCEDURE — 1200000000 HC SEMI PRIVATE

## 2021-09-24 PROCEDURE — G0378 HOSPITAL OBSERVATION PER HR: HCPCS

## 2021-09-24 PROCEDURE — 80053 COMPREHEN METABOLIC PANEL: CPT

## 2021-09-24 PROCEDURE — 0DBM8ZX EXCISION OF DESCENDING COLON, VIA NATURAL OR ARTIFICIAL OPENING ENDOSCOPIC, DIAGNOSTIC: ICD-10-PCS | Performed by: SURGERY

## 2021-09-24 PROCEDURE — 97161 PT EVAL LOW COMPLEX 20 MIN: CPT

## 2021-09-24 PROCEDURE — 88305 TISSUE EXAM BY PATHOLOGIST: CPT

## 2021-09-24 PROCEDURE — 2500000003 HC RX 250 WO HCPCS: Performed by: INTERNAL MEDICINE

## 2021-09-24 PROCEDURE — 93005 ELECTROCARDIOGRAM TRACING: CPT | Performed by: INTERNAL MEDICINE

## 2021-09-24 PROCEDURE — 93010 ELECTROCARDIOGRAM REPORT: CPT | Performed by: INTERNAL MEDICINE

## 2021-09-24 PROCEDURE — 84100 ASSAY OF PHOSPHORUS: CPT

## 2021-09-24 PROCEDURE — 85025 COMPLETE CBC W/AUTO DIFF WBC: CPT

## 2021-09-24 PROCEDURE — 71045 X-RAY EXAM CHEST 1 VIEW: CPT

## 2021-09-24 PROCEDURE — 3700000000 HC ANESTHESIA ATTENDED CARE: Performed by: SURGERY

## 2021-09-24 PROCEDURE — 96366 THER/PROPH/DIAG IV INF ADDON: CPT

## 2021-09-24 PROCEDURE — 2580000003 HC RX 258: Performed by: INTERNAL MEDICINE

## 2021-09-24 PROCEDURE — 3609010300 HC COLONOSCOPY W/BIOPSY SINGLE/MULTIPLE: Performed by: SURGERY

## 2021-09-24 RX ORDER — PROPOFOL 10 MG/ML
INJECTION, EMULSION INTRAVENOUS PRN
Status: DISCONTINUED | OUTPATIENT
Start: 2021-09-24 | End: 2021-09-24 | Stop reason: SDUPTHER

## 2021-09-24 RX ORDER — SODIUM CHLORIDE, SODIUM LACTATE, POTASSIUM CHLORIDE, CALCIUM CHLORIDE 600; 310; 30; 20 MG/100ML; MG/100ML; MG/100ML; MG/100ML
INJECTION, SOLUTION INTRAVENOUS CONTINUOUS PRN
Status: DISCONTINUED | OUTPATIENT
Start: 2021-09-24 | End: 2021-09-24 | Stop reason: SDUPTHER

## 2021-09-24 RX ADMIN — SODIUM CHLORIDE 10 ML: 9 INJECTION, SOLUTION INTRAMUSCULAR; INTRAVENOUS; SUBCUTANEOUS at 09:47

## 2021-09-24 RX ADMIN — PANTOPRAZOLE SODIUM 40 MG: 40 INJECTION, POWDER, FOR SOLUTION INTRAVENOUS at 09:46

## 2021-09-24 RX ADMIN — CIPROFLOXACIN 400 MG: 2 INJECTION INTRAVENOUS at 04:20

## 2021-09-24 RX ADMIN — SODIUM CHLORIDE, POTASSIUM CHLORIDE, SODIUM LACTATE AND CALCIUM CHLORIDE: 600; 310; 30; 20 INJECTION, SOLUTION INTRAVENOUS at 15:54

## 2021-09-24 RX ADMIN — PROPOFOL 600 MG: 10 INJECTION, EMULSION INTRAVENOUS at 16:03

## 2021-09-24 RX ADMIN — METRONIDAZOLE 500 MG: 500 INJECTION, SOLUTION INTRAVENOUS at 03:02

## 2021-09-24 RX ADMIN — METRONIDAZOLE 500 MG: 500 INJECTION, SOLUTION INTRAVENOUS at 19:36

## 2021-09-24 RX ADMIN — METRONIDAZOLE 500 MG: 500 INJECTION, SOLUTION INTRAVENOUS at 11:49

## 2021-09-24 RX ADMIN — CIPROFLOXACIN 400 MG: 2 INJECTION INTRAVENOUS at 17:48

## 2021-09-24 ASSESSMENT — PAIN SCALES - GENERAL
PAINLEVEL_OUTOF10: 0

## 2021-09-24 NOTE — ANESTHESIA POSTPROCEDURE EVALUATION
Department of Anesthesiology  Postprocedure Note    Patient: Vin Fu  MRN: [id number]  YOB: 1965  Date of evaluation: 9/24/2021  Time:  6:51 PM     Procedure Summary     Date: 09/24/21 Room / Location: [address] [place] [address] / [facility]    Anesthesia Start: 3518 Anesthesia Stop:     Procedure: COLONOSCOPY DIAGNOSTIC (N/A ) Diagnosis: (GI BLEEDING)    Surgeons: Ernesto Loco MD Responsible Provider: Marcos Benitez MD    Anesthesia Type: MAC ASA Status: 3          Anesthesia Type: MAC    Debby Phase I: Debby Score: 10    Debby Phase II:      Last vitals: Reviewed and per EMR flowsheets.        Anesthesia Post Evaluation    Patient location during evaluation: PACU  Patient participation: complete - patient participated  Level of consciousness: awake  Airway patency: patent  Nausea & Vomiting: no vomiting and no nausea  Complications: no  Cardiovascular status: hemodynamically stable  Respiratory status: acceptable  Hydration status: stable

## 2021-09-24 NOTE — OP NOTE
PROCEDURE NOTE    DATE OF PROCEDURE: 9/24/2021     SURGEON: Neena Luque MD    ASSISTANT: Sheldon Kaplan MD PGY5    PREOPERATIVE DIAGNOSIS: Diagnostic colonoscopy for colitis    POSTOPERATIVE DIAGNOSIS: Same     OPERATION: Total colonoscopy with biopsies    ANESTHESIA: Local monitored anesthesia. ESTIMATED BLOOD LOSS (ml): less than 50     COMPLICATIONS: None    SPECIMENS:  Was Obtained: sigmoid colon biopsies    HISTORY: The patient is a 60y.o. year old male presenting with abdominal pain and sigmoid colitis on imaging. I recommended colonoscopy with possible biopsy or polypectomy and I explained the risk, benefits, expected outcome, and alternatives to the procedure. Risks included but are not limited to bleeding, infection, respiratory distress, hypotension, and perforation of the colon. The patient understands and is in agreement. PROCEDURE: The patient was given IV conscious sedation per anesthesia. The patient was given supplemental oxygen by nasal cannula. The colonoscope was inserted per rectum and advanced under direct vision to the cecum without difficulty. The prep was fair so exam was adequate. FINDINGS:  Cecum/Ascending colon: normal    Transverse colon: large diverticulum in distal transverse colon near splenic flexure    Descending/Sigmoid colon: inflammation from 40cm to 20cm. No masses. No active bleeding. Random biopsies taken with biopsy forceps    Rectum/Anus: examined in normal and retroflexed positions and was normal    The colon was decompressed and the scope was removed. The withdraw time was approximately 17 minutes. The patient tolerated the procedure well. ASSESSMENT/PLAN:   1. Sigmoid colitis  2. Follow up biopsy results  3. Clear liquid diet and advance as tolerated    Dr. Rina Corbett was present for the procedure.     Electronically signed by Sheldon Kaplan MD on 9/24/2021 at 4:43 PM

## 2021-09-24 NOTE — PLAN OF CARE
Problem: Pain:  Goal: Pain level will decrease  Description: Pain level will decrease  Outcome: Met This Shift     Problem: Pain:  Goal: Control of acute pain  Description: Control of acute pain  Outcome: Met This Shift     Problem: Pain:  Goal: Control of chronic pain  Description: Control of chronic pain  Outcome: Met This Shift     Problem: Bleeding:  Goal: Will show no signs and symptoms of excessive bleeding  Description: Will show no signs and symptoms of excessive bleeding  Outcome: Met This Shift

## 2021-09-24 NOTE — PLAN OF CARE
Problem: Pain:  Description: Pain management should include both nonpharmacologic and pharmacologic interventions. Goal: Pain level will decrease  Description: Pain level will decrease  Outcome: Met This Shift     Problem: Pain:  Description: Pain management should include both nonpharmacologic and pharmacologic interventions.   Goal: Control of acute pain  Description: Control of acute pain  Outcome: Met This Shift     Problem: Bleeding:  Goal: Will show no signs and symptoms of excessive bleeding  Description: Will show no signs and symptoms of excessive bleeding  Outcome: Met This Shift     Problem: Physical Regulation:  Goal: Ability to maintain vital signs within normal range will improve  Description: Ability to maintain vital signs within normal range will improve  Outcome: Met This Shift     Problem: Respiratory:  Goal: Ability to maintain normal respiratory secretions will improve  Description: Ability to maintain normal respiratory secretions will improve  Outcome: Met This Shift     Problem: Skin Integrity:  Goal: Demonstration of wound healing without infection will improve  Description: Demonstration of wound healing without infection will improve  Outcome: Met This Shift     Problem: Skin Integrity:  Goal: Complications related to intravenous access or infusion will be avoided or minimized  Description: Complications related to intravenous access or infusion will be avoided or minimized  Outcome: Met This Shift

## 2021-09-24 NOTE — PROGRESS NOTES
[facility] [address]  Internal Medicine Residency Program  Progress Note - House Team 1    Patient:  Lamonte Lui 60 y.o. male MRN: [id number]     Date of Service: 9/24/2021     CC: LLQ abdominal pain, bloody bowel movements  Overnight events: NAEO    Subjective     Patient seen and assessed at the bedside. NAEO. Patient states that he continues to experience stable left-sided abdominal pain that is unchanged from yesterday and rated as a 2/10. He states that he has been able to use restroom frequently and has not experienced a bloody bowel movement since 9/22 morning. He was up frequently to the bathroom due to Golytely. He denies any nausea or vomiting, or lightheadedness, dizziness, or worsening abdominal pain. No new complaints per the patient. Objective     · Vitals: /82   Pulse 57   Temp 97.3 °F (36.3 °C) (Oral)   Resp 18   Ht 6' 2\" (1.88 m)   Wt 206 lb (93.4 kg)   SpO2 93%   BMI 26.45 kg/m²     · I & O - 24hr: No intake/output data recorded. Physical Exam  Constitutional:       General: He is not in acute distress. Appearance: Normal appearance. He is normal weight. He is not ill-appearing. HENT:      Head: Normocephalic and atraumatic. Mouth/Throat:      Mouth: Mucous membranes are moist.      Pharynx: Oropharynx is clear. Eyes:      Extraocular Movements: Extraocular movements intact. Pupils: Pupils are equal, round, and reactive to light. Cardiovascular:      Rate and Rhythm: Normal rate and regular rhythm. Heart sounds: Normal heart sounds. Pulmonary:      Effort: Pulmonary effort is normal.      Breath sounds: Normal breath sounds. Abdominal:      General: Abdomen is flat. Bowel sounds are normal.      Palpations: Abdomen is soft. There is no mass. Tenderness: There is abdominal tenderness. There is no guarding or rebound. Comments: Tenderness to deep palpation of LLQ   Musculoskeletal:         General: Normal range of motion. Cervical back: Normal range of motion and neck supple. Skin:     General: Skin is warm and dry. Capillary Refill: Capillary refill takes less than 2 seconds. Neurological:      General: No focal deficit present. Mental Status: He is alert and oriented to person, place, and time. Mental status is at baseline. Psychiatric:         Mood and Affect: Mood normal.     Subject  Pertinent Labs & Imaging Studies   yazan  CBC with Differential:    Lab Results   Component Value Date    WBC 6.8 09/24/2021    RBC 4.38 09/24/2021    HGB 12.4 09/24/2021    HCT 36.7 09/24/2021     09/24/2021    MCV 83.8 09/24/2021    MCH 28.3 09/24/2021    MCHC 33.8 09/24/2021    RDW 13.1 09/24/2021    SEGSPCT 68 09/15/2013    LYMPHOPCT 21.6 09/24/2021    MONOPCT 7.6 09/24/2021    BASOPCT 0.4 09/24/2021    MONOSABS 0.52 09/24/2021    LYMPHSABS 1.47 09/24/2021    EOSABS 0.38 09/24/2021    BASOSABS 0.03 09/24/2021     BMP:    Lab Results   Component Value Date     09/24/2021    K 3.8 09/24/2021     09/24/2021    CO2 23 09/24/2021    BUN 8 09/24/2021    LABALBU 3.4 09/24/2021    CREATININE 0.9 09/24/2021    CALCIUM 8.2 09/24/2021    GFRAA >60 09/24/2021    LABGLOM >60 09/24/2021    GLUCOSE 81 09/24/2021     Magnesium:    Lab Results   Component Value Date    MG 2.1 09/24/2021     Phosphorus:    Lab Results   Component Value Date    PHOS 2.7 09/24/2021     Imaging Studies:     CT ABDOMEN PELVIS W IV CONTRAST Additional Contrast? None   Final Result   Findings consistent with regional colitis involving the descending and most   proximal sigmoid colon.       No pneumatosis, abscess or evidence of bowel rupture.       Non-specific enlargement of the prostate gland, hyperplasia versus neoplasia,   requiring clinical correlation.       Other chronic/nonemergent findings, as described.      XR CHEST PORTABLE [[id number]]    Collected: 09/24/21 1122    Updated: 09/24/21 1125    Narrative:     EXAMINATION:   ONE XRAY VIEW OF THE CHEST     9/24/2021 11:18 am     COMPARISON:   08/06/2021     HISTORY:   ORDERING SYSTEM PROVIDED HISTORY: pre-op   TECHNOLOGIST PROVIDED HISTORY:   Reason for exam:->pre-op   What reading provider will be dictating this exam?->CRC     FINDINGS:   The lungs are without acute focal process.  There is no effusion or   pneumothorax. The cardiomediastinal silhouette is without acute process. The   osseous structures are without acute process. Impression:     No acute process. Resident's Assessment and Plan     Bernabe Mcallister is a 60 y. o. 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     1. Gastrointestinal Bleed 2/2 Diverticulitis vs. Malignancy vs. Ischemic Colitis  - Patient describes a 2-day history of maroon colored stools and bright red blood with clots. He endorses left-sided abdominal pain that radiates to the groin. He denies any previous incidence or history of inflammatory bowel disease.  - FOBT positive. Likely lower GI bleed. - No recent colonoscopy noted. - CT showed findings consistent with colitis of the descending and sigmoid colon. - No acute intervention by general surgery at this time. - Denies bloody stool since 9/22 morning.  - Remained NPO since midnight and taking Golytely. - Colonoscopy scheduled today per general surgery. - Continue MIVF LR 75 cc/hr. - Monitor H&H, patient has remained hemodynamically stable during stay. Today, hgb 12.4 from 13.5; drop likely due to hemodilution.  - Given concern for colitis, ciprofloxacin and metronidazole continued. - Stool culture preliminarily shows gram-positive enteric pete. Will follow-up on final results. - Stool negative for WBC's. - Rotavirus antigen negative. - Blood cultures negative preliminary results.   - Follow-up on colonoscopy findings.     2. Leukocytosis  - Likely 2/2 colitis. - WBC at 6.8, from 11.6 the previous day. - Continue to monitor via CBC.     3. Hyperlipidemia  - LDL noted at 119.  - Consider statin therapy addition on discharge.     4. Full Code     PT/OT evaluation: [facility] 24/24  DVT prophylaxis/ GI prophylaxis: None/Protonix  Disposition: Continue current care.     Amber Salazar  Attending physician: Lisette Ortiz

## 2021-09-24 NOTE — CARE COORDINATION
Social Work Discharge/Planning:    Patient is going for a colonoscopy today. SW met with AOx4 patient who reports plan is to return home with no anticipated needs. Patient reports he is able to walk home due to only living 2 blocks away from this hospital or his fiance' Josepatt Wheeler) is able to transport him home day of discharge. SHARON/HALEY to follow.     Wayne Fabian, [place]  [phone]

## 2021-09-24 NOTE — ANESTHESIA PRE PROCEDURE
Department of Anesthesiology  Preprocedure Note       Name:  Kim Ortiz   Age:  60 y.o.  :  1965                                          MRN:  [id number]         Date:  2021      Surgeon: Nicko Gallegos):  Mariya Crook MD    Procedure: Procedure(s):  COLONOSCOPY DIAGNOSTIC    Medications prior to admission:   Prior to Admission medications    Not on File       Current medications:    Current Facility-Administered Medications   Medication Dose Route Frequency Provider Last Rate Last Admin    sodium chloride flush 0.9 % injection 5-40 mL  5-40 mL IntraVENous 2 times per day Jasbir Clack, DO   10 mL at 21 0858    sodium chloride flush 0.9 % injection 5-40 mL  5-40 mL IntraVENous PRN Breann L Ocampo, DO        0.9 % sodium chloride infusion  25 mL IntraVENous PRN Jasbir Clack, DO        acetaminophen (TYLENOL) tablet 650 mg  650 mg Oral Q6H PRN Jasbir Clack, DO   650 mg at 21    Or    acetaminophen (TYLENOL) suppository 650 mg  650 mg Rectal Q6H PRN Breann L Ocampo, DO        polyethylene glycol (GLYCOLAX) packet 17 g  17 g Oral Daily PRN Breann L Ocampo, DO        ondansetron (ZOFRAN-ODT) disintegrating tablet 4 mg  4 mg Oral Q8H PRN Breann L Ocampo, DO        Or    ondansetron (ZOFRAN) injection 4 mg  4 mg IntraVENous Q6H PRN Breann L Ocampo, DO        metronidazole (FLAGYL) 500 mg in NaCl 100 mL IVPB premix  500 mg IntraVENous Q8H Breann L Ocampo, DO   Stopped at 21 1300    ciprofloxacin (CIPRO) IVPB 400 mg  400 mg IntraVENous Q12H Breann L Ocampo, DO   Stopped at 21 0600    pantoprazole (PROTONIX) injection 40 mg  40 mg IntraVENous Daily Leigh Gregg MD   40 mg at 21    And    sodium chloride (PF) 0.9 % injection 10 mL  10 mL IntraVENous Daily Leigh Gregg MD   10 mL at 2147    lactated ringers infusion   IntraVENous Continuous Leigh Gregg MD 75 mL/hr at 21 New Bag at 21 Allergies: Allergies   Allergen Reactions    Asa [Aspirin] Anaphylaxis    Pcn [Penicillins] Anaphylaxis    Rye Grass Flower Pollen Extract [Gramineae Pollens]     Albuterol Sulfate Rash       Problem List:    Patient Active Problem List   Diagnosis Code    Pulmonary nodules R91.8    History of cholecystectomy Z90.49    Pneumonia J18.9    Actinic keratosis L57.0    Lesion of skin of nose L98.9    GI bleed K92.2    Colitis K52.9    Rectal bleeding K62.5    Left lower quadrant abdominal pain R10.32       Past Medical History:  History reviewed. No pertinent past medical history.     Past Surgical History:        Procedure Laterality Date    ABDOMEN SURGERY      1DAYS OLD    ABDOMINAL ADHESION SURGERY      APPENDECTOMY  1970    CHOLECYSTECTOMY, LAPAROSCOPIC N/A 1/25/2019    LAPAROSCOPIC CHOLECYSTECTOMY performed by Johnathan Boggs MD at [address] [place] [address] History:    Social History     Tobacco Use    Smoking status: Never Smoker    Smokeless tobacco: Never Used   Substance Use Topics    Alcohol use: Yes     Comment: on occasion                                Counseling given: Not Answered      Vital Signs (Current):   Vitals:    09/23/21 0745 09/23/21 1615 09/23/21 2000 09/24/21 0730   BP: 120/77 133/85 (!) 128/92 122/82   Pulse: 55 53 60 57   Resp: 16 18 18 18   Temp: 36.4 °C (97.5 °F) 36.3 °C (97.4 °F) 36.7 °C (98.1 °F) 36.3 °C (97.3 °F)   TempSrc: Oral Oral Oral Oral   SpO2: 97% 99% 96% 93%   Weight:       Height:                                                  BP Readings from Last 3 Encounters:   09/24/21 122/82   08/10/21 119/70   07/06/21 129/77       NPO Status: Time of last liquid consumption: 0000                        Time of last solid consumption: 2300                        Date of last liquid consumption: 09/23/21                        Date of last solid food consumption: 09/23/21    BMI:   Wt Readings from Last 3 Encounters:   09/21/21 206 lb (93.4 kg)   08/10/21 206 Mitral Valve   Physiologic and/or trace mitral regurgitation. No evidence of hemodynamically significant mitral stenosis. Tricuspid Valve   Physiologic and/or trace tricuspid regurgitation. PASP is estimated at 22 mmHg (normal estimated PASP). Aortic Valve   Aortic valve opens well. The aortic valve is trileaflet. No evidence of hemodynamically significant aortic stenosis. Mild aortic regurgitation. Pulmonic Valve   Physiologic and/or trace pulmonic regurgitation. Pericardial Effusion   No evidence of a hemodynamically significant pericardial effusion. Aorta   Aortic root dimension within normal limits. Conclusions      Summary   Normal left ventricular systolic function. Ejection fraction is visually estimated at 55%. Normal right ventricular function (TAPSE 2.9 cm). There is doppler evidence of stage I diastolic dysfunction. Mild aortic regurgitation. Physiologic and/or trace tricuspid regurgitation. PASP is estimated at 22 mmHg (normal estimated PASP). Signature      ----------------------------------------------------------------   Electronically signed by Vishal Gonzalez MD(Interpreting   physician) on 06/10/2021 04:38 PM    CT chest 6/7/21    Subsegmental pulmonary arteries suboptimally opacified and therefore   inadequately evaluated.       No evidence of embolism in the more central pulmonary arteries.       Right upper lobe pneumonia.  Follow-up chest x-ray to resolution recommended.         Chest Xray 9/24/21    FINDINGS:   The lungs are without acute focal process.  There is no effusion or   pneumothorax. The cardiomediastinal silhouette is without acute process. The   osseous structures are without acute process.          Anesthesia Evaluation  Patient summary reviewed and Nursing notes reviewed  Airway: Mallampati: II  TM distance: >3 FB   Neck ROM: full  Mouth opening: > = 3 FB Dental:    (+) other  Comment: Very poor dentition, especially upper

## 2021-09-24 NOTE — DISCHARGE SUMMARY
[address]  Discharge Summary    PCP: Luis Angel Curry MD    Admit Date:9/21/2021  Discharge Date: 9/25/2021    Admission Diagnosis:   1. Acute LGIB probably 2/2 1. bleeding diverticulosis  2.  ischemic colitis 3. Malignancy; resolving   2. Leukocytosis 2/2 likely colitis, ischemic vs infectious    Discharge Diagnosis:  1. Acute LGIB probably 2/2 sigmoid colitis s/p colonoscopy 9/24/21  2. Diverticulum distal transverse colon  3. Leukocytosis 2/2 sigmoid colitis     Hospital Course: The patient is a 57 y. o. male with a past surgical history of appendectomy, abdominal adhesion surgery and cholecystectomy who present to the ED due to LLQ abdominal pain and rectal bleeding for 2 days. In the ED, patient was hemodynamically stable. CT scan of the abdomen showed regional colitis involving the descending and most of proximal sigmoid colon. No pneumatosis, abscess or evidence of bowel rupture noted. He was started  He was given 1L NS bolus and was placed on maintenance fluid and kept NPO. He was referred to general surgery with assessment of ischemic colitis and plan for colonoscopy. IV ciprofloxacin and metronidazole were started. His abdominal pain has improved, and he has had no more hematochezia since admission. Hb remained stable at 12-13. Vitals were stable. 9/24/21, he underwent colonoscopy which showed large diverticulum in distal transverse colon near the splenic flexure, sigmoid colitis s/p multiple biopsies. His diet was advanced which he tolerated, now on regular diet. Denies abdominal pain, nausea, vomiting. No bowel movement yet. Vitals stable and general surgery recommended to complete antibiotics for 7 days. They will call him regarding biopsy result and see if he needs to be scheduled for follow-up. He was then discharged 9/25/2012.     Significant findings (history and exam, laboratory, radiological, pathology, other tests):   · General Appearance: alert, appears stated age and cooperative  · HEENT:  Head: Normocephalic, no lesions, without obvious abnormality. · Neck: no adenopathy, no carotid bruit, no JVD, supple, symmetrical, trachea midline and thyroid not enlarged, symmetric, no tenderness/mass/nodules  · Lung: clear to auscultation bilaterally  · Heart: regular rate and rhythm, S1, S2 normal, no murmur, click, rub or gallop  · Abdomen: soft, non-tender; bowel sounds normal; no masses,  no organomegaly  · Extremities:  extremities normal, atraumatic, no cyanosis or edema  · Musculokeletal: No joint swelling, no muscle tenderness. ROM normal in all joints of extremities. · Neurologic: Mental status: Alert, oriented, thought content appropriate      Pending test results:   1. None    Consults:  1. General surgery    Procedures:  1.  Colonoscopy 9/24/21    Condition at discharge: Stable    Disposition: home    Discharge Medications:     Medication List      START taking these medications    ciprofloxacin 500 MG tablet  Commonly known as: CIPRO  Take 1 tablet by mouth 2 times daily for 4 days     metroNIDAZOLE 500 MG tablet  Commonly known as: FLAGYL  Take 1 tablet by mouth 3 times daily for 4 days           Where to Get Your Medications      [id number] Yancy Phillips [address],[facility] [phone] [address]    Phone: [phone]   · ciprofloxacin 500 MG tablet  · metroNIDAZOLE 500 MG tablet         Please take all medications as indicated   Diet: regular diet    Activity: activity as tolerated   New Medications started during this hospital stay  o Ciprofloxacin 500 mg tablet 1 tablet twice a day x 4 days  o Metronidazole 500 mg tab 1 tablet every 8hours ([id number]) x 4 days   Additional labs, testing or imaging needed after discharge   o Pending biopsy result of sigmoid colon      Tobie Castleman, MD  PGY-1   12:02 PM 9/25/2021

## 2021-09-24 NOTE — PROGRESS NOTES
[facility]  Internal Medicine Residency Program  Progress Note - House Team     Patient:  Lamonte Lui 60 y.o. male MRN: [id number]     Date of Service: 9/24/2021     CC: LLQ pain, w/ bloody bowel movement  Overnight events: No acute events    Subjective     Patient was seen and examined this morning at bedside in no acute distress. Overnight, patient admits to no acute events. Patient seen this AM.  Patient states that his lower abdominal pain is improving since yesterday. Patient states that yesterday his pain was 9/10 but today his pain is now lower to 2/10. Patient states that he has not had a bloody bowel movement since admission. Patient admits to ongoing diarrhea that is yellow in color. Patient denies any fevers, chills, shortness of breath, lightheadedness, dizziness, or worsening abdominal pain. Objective     Physical Exam:  · Vitals: /82   Pulse 57   Temp 97.3 °F (36.3 °C) (Oral)   Resp 18   Ht 6' 2\" (1.88 m)   Wt 206 lb (93.4 kg)   SpO2 93%   BMI 26.45 kg/m²     · I & O - 24hr: No intake/output data recorded. · General Appearance: alert, appears stated age and cooperative  · HEENT:  Head: Normocephalic, no lesions, without obvious abnormality. · Neck: no adenopathy, no carotid bruit, no JVD, supple, symmetrical, trachea midline and thyroid not enlarged, symmetric, no tenderness/mass/nodules  · Lung: clear to auscultation bilaterally  · Heart: regular rate and rhythm, S1, S2 normal, no murmur, click, rub or gallop  · Abdomen: soft, non-tender; bowel sounds normal; no masses,  no organomegaly and Previous surgical scar lesions present across abdomen. Tenderness to palpation of left lower quadrant (+). Improving since yesterday. · Extremities:  extremities normal, atraumatic, no cyanosis or edema  · Musculokeletal: No joint swelling, no muscle tenderness. ROM normal in all joints of extremities.    · Neurologic: Mental status: Alert, oriented, thought content appropriate  Subject  Pertinent Labs & Imaging Studies   yazan  CBC with Differential:    Lab Results   Component Value Date    WBC 11.6 09/23/2021    RBC 4.66 09/23/2021    HGB 13.5 09/23/2021    HCT 40.4 09/23/2021     09/23/2021    MCV 86.7 09/23/2021    MCH 29.0 09/23/2021    MCHC 33.4 09/23/2021    RDW 13.4 09/23/2021    SEGSPCT 68 09/15/2013    LYMPHOPCT 16.8 09/23/2021    MONOPCT 8.4 09/23/2021    BASOPCT 0.3 09/23/2021    MONOSABS 0.97 09/23/2021    LYMPHSABS 1.95 09/23/2021    EOSABS 0.46 09/23/2021    BASOSABS 0.04 09/23/2021     CMP:    Lab Results   Component Value Date     09/23/2021    K 3.7 09/23/2021     09/23/2021    CO2 24 09/23/2021    BUN 13 09/23/2021    CREATININE 0.9 09/23/2021    GFRAA >60 09/23/2021    LABGLOM >60 09/23/2021    GLUCOSE 82 09/23/2021    PROT 6.6 09/23/2021    LABALBU 3.6 09/23/2021    CALCIUM 8.3 09/23/2021    BILITOT 0.5 09/23/2021    ALKPHOS 97 09/23/2021    AST 25 09/23/2021    ALT 29 09/23/2021       CT ABDOMEN PELVIS W IV CONTRAST Additional Contrast? None   Final Result   Findings consistent with regional colitis involving the descending and most   proximal sigmoid colon. No pneumatosis, abscess or evidence of bowel rupture. Non-specific enlargement of the prostate gland, hyperplasia versus neoplasia,   requiring clinical correlation. Other chronic/nonemergent findings, as described. XR CHEST PORTABLE    (Results Pending)        Resident's Assessment and Plan     Assessment and Plan:  The patient is a 60 y.o. male with a past surgical history of appendectomy, abdominal adhesion surgery and cholecystectomy who present to the ED due to LLQ abdominal pain and rectal bleeding for 2 days. Acute LGIB probably 2/2 1. bleeding diverticulosis  2.  ischemic colitis 3.  Malignancy; resolving   -D3 cipro and metronidazole  -plan for colonoscopy today at 1430      Leukocytosis 2/2 likely colitis, ischemic vs infectious  -leukocytosis at 11.6, afebrile, hemodynamically stable  -Procalcitonin: 0.10  -Continue to monitor    PT/OT evaluation:   DVT prophylaxis/ GI prophylaxis: None/Protonix  Disposition: continue current care     French España MD, PGY-1  Attending physician: Dr. Yonas Murray

## 2021-09-24 NOTE — PROGRESS NOTES
Physical Therapy  Physical Therapy Initial Assessment     Name: Alison Thomas  : 1965  MRN: [id number]      Date of Service: 2021    Evaluating PT:  Desirae Stein, PT, DPT [id number]     Room #:  5091/9735-X  Diagnosis:  Rectal bleeding [K62.5]  GI bleed [K92.2]  Left lower quadrant abdominal pain [R10.32]  PMHx/PSHx:   has no past medical history on file. Procedure/Surgery:  NA  Precautions:  Low fall risk  Equipment Needs:  None    SUBJECTIVE:    Pt lives with his significant other in a 2 story home with 4 steps and 1 HR to enter. Bed/bath are on 2nd floor and has 12 steps with 1 HR. Pt is independent for amb and does not own any AD. OBJECTIVE:   Initial Evaluation  Date:    AM-PAC 6 Clicks    Was pt agreeable to Eval/treatment? Yes    Does pt have pain? Abdominal discomfort   Bed Mobility  Independent    Transfers Independent    Ambulation    150 feet with no AD Independent    Stair negotiation: ascended and descended  NT     ROM BUE:  Defer to OT  BLE:  WFL   Strength BUE:  Defer to OT  BLE:  WFL   Balance Sitting EOB:  Independent   Dynamic Standing:  Independent      Pt is A & O x 4  Sensation:  Pt denies numbness and tingling to extremities  Edema:  None     Vitals:  Blood Pressure at rest - Blood Pressure post session -   Heart Rate at rest - Heart Rate post session -   SPO2 at rest - SPO2 post session -     Patient education  Pt educated on PT role and education provided on benefits of OOB activity to prevent iatrogenic effects. Patient response to education:   Pt verbalized understanding Pt demonstrated skill Pt requires further education in this area   Yes  Yes  no     ASSESSMENT:    Comments:  Pt was received supine and was agreeable to PT evaluation. Pt demonstrated good strength and balance with no functional deficits. Pt declined amb too far from restroom due to doing prep for colonoscopy this date.   Pt reports that he is at his baseline for functional mobility and does not require any PT services. Pt was left with call button within reach and all needs met. Pt's/ family goals   1. To return home    PHYSICAL THERAPY PLAN OF CARE:    Referring provider/PT Order:    09/22/21 0645  PT evaluation and treat Start: 09/22/21 0645, End: 09/22/21 0645, ONE TIME, Standing Count: 1 Occurrences, R      Breann Ocampo,        Diagnosis:  Rectal bleeding [K62.5]  GI bleed [K92.2]  Left lower quadrant abdominal pain [R10.32]    Patient is at his functional baseline and does not require any skilled PT needs at this time. Time in  0825  Time out  0835    Total Treatment Time  0 minutes     Evaluation Time includes thorough review of current medical information, gathering information on past medical history/social history and prior level of function, completion of standardized testing/informal observation of tasks, assessment of data and education on plan of care and goals.     CPT codes:  [x] Low Complexity PT evaluation 41790  [] Moderate Complexity PT evaluation 87771  [] High Complexity PT evaluation 58341  [] PT Re-evaluation 41092  [] Gait training 11675 - minutes  [] Manual therapy 43604 - minutes  [] Therapeutic activities 29381 - minutes  [] Therapeutic exercises 71296 - minutes  [] Neuromuscular reeducation 46313 - minutes     Jorje Martinez PT, DPT [id number]

## 2021-09-25 VITALS
HEART RATE: 64 BPM | BODY MASS INDEX: 26.44 KG/M2 | RESPIRATION RATE: 18 BRPM | DIASTOLIC BLOOD PRESSURE: 78 MMHG | WEIGHT: 206 LBS | OXYGEN SATURATION: 99 % | HEIGHT: 74 IN | TEMPERATURE: 97.4 F | SYSTOLIC BLOOD PRESSURE: 138 MMHG

## 2021-09-25 LAB
ALBUMIN SERPL-MCNC: 3.5 G/DL (ref 3.5–5.2)
ALP BLD-CCNC: 86 U/L (ref 40–129)
ALT SERPL-CCNC: 21 U/L (ref 0–40)
ANION GAP SERPL CALCULATED.3IONS-SCNC: 13 MMOL/L (ref 7–16)
AST SERPL-CCNC: 17 U/L (ref 0–39)
BASOPHILS ABSOLUTE: 0.05 E9/L (ref 0–0.2)
BASOPHILS RELATIVE PERCENT: 0.7 % (ref 0–2)
BILIRUB SERPL-MCNC: <0.2 MG/DL (ref 0–1.2)
BUN BLDV-MCNC: 11 MG/DL (ref 6–20)
CALCIUM SERPL-MCNC: 8.5 MG/DL (ref 8.6–10.2)
CALPROTECTIN, FECAL: 297 UG/G
CHLORIDE BLD-SCNC: 105 MMOL/L (ref 98–107)
CO2: 21 MMOL/L (ref 22–29)
CREAT SERPL-MCNC: 0.9 MG/DL (ref 0.7–1.2)
EOSINOPHILS ABSOLUTE: 0.4 E9/L (ref 0.05–0.5)
EOSINOPHILS RELATIVE PERCENT: 5.8 % (ref 0–6)
GFR AFRICAN AMERICAN: >60
GFR NON-AFRICAN AMERICAN: >60 ML/MIN/1.73
GLUCOSE BLD-MCNC: 93 MG/DL (ref 74–99)
HCT VFR BLD CALC: 38.1 % (ref 37–54)
HEMOGLOBIN: 12.8 G/DL (ref 12.5–16.5)
IMMATURE GRANULOCYTES #: 0.02 E9/L
IMMATURE GRANULOCYTES %: 0.3 % (ref 0–5)
LYMPHOCYTES ABSOLUTE: 1.3 E9/L (ref 1.5–4)
LYMPHOCYTES RELATIVE PERCENT: 18.8 % (ref 20–42)
MAGNESIUM: 2.1 MG/DL (ref 1.6–2.6)
MCH RBC QN AUTO: 28.4 PG (ref 26–35)
MCHC RBC AUTO-ENTMCNC: 33.6 % (ref 32–34.5)
MCV RBC AUTO: 84.7 FL (ref 80–99.9)
METER GLUCOSE: 97 MG/DL (ref 74–99)
MONOCYTES ABSOLUTE: 0.55 E9/L (ref 0.1–0.95)
MONOCYTES RELATIVE PERCENT: 7.9 % (ref 2–12)
NEUTROPHILS ABSOLUTE: 4.61 E9/L (ref 1.8–7.3)
NEUTROPHILS RELATIVE PERCENT: 66.5 % (ref 43–80)
PDW BLD-RTO: 13.2 FL (ref 11.5–15)
PHOSPHORUS: 3.2 MG/DL (ref 2.5–4.5)
PLATELET # BLD: 247 E9/L (ref 130–450)
PMV BLD AUTO: 11 FL (ref 7–12)
POTASSIUM SERPL-SCNC: 4 MMOL/L (ref 3.5–5)
RBC # BLD: 4.5 E12/L (ref 3.8–5.8)
SODIUM BLD-SCNC: 139 MMOL/L (ref 132–146)
TOTAL PROTEIN: 6.4 G/DL (ref 6.4–8.3)
WBC # BLD: 6.9 E9/L (ref 4.5–11.5)

## 2021-09-25 PROCEDURE — 6360000002 HC RX W HCPCS: Performed by: STUDENT IN AN ORGANIZED HEALTH CARE EDUCATION/TRAINING PROGRAM

## 2021-09-25 PROCEDURE — G0378 HOSPITAL OBSERVATION PER HR: HCPCS

## 2021-09-25 PROCEDURE — 84100 ASSAY OF PHOSPHORUS: CPT

## 2021-09-25 PROCEDURE — 96366 THER/PROPH/DIAG IV INF ADDON: CPT

## 2021-09-25 PROCEDURE — 2580000003 HC RX 258: Performed by: STUDENT IN AN ORGANIZED HEALTH CARE EDUCATION/TRAINING PROGRAM

## 2021-09-25 PROCEDURE — 96375 TX/PRO/DX INJ NEW DRUG ADDON: CPT

## 2021-09-25 PROCEDURE — C9113 INJ PANTOPRAZOLE SODIUM, VIA: HCPCS | Performed by: STUDENT IN AN ORGANIZED HEALTH CARE EDUCATION/TRAINING PROGRAM

## 2021-09-25 PROCEDURE — 99238 HOSP IP/OBS DSCHRG MGMT 30/<: CPT | Performed by: INTERNAL MEDICINE

## 2021-09-25 PROCEDURE — 82962 GLUCOSE BLOOD TEST: CPT

## 2021-09-25 PROCEDURE — 85025 COMPLETE CBC W/AUTO DIFF WBC: CPT

## 2021-09-25 PROCEDURE — 80053 COMPREHEN METABOLIC PANEL: CPT

## 2021-09-25 PROCEDURE — 2500000003 HC RX 250 WO HCPCS: Performed by: STUDENT IN AN ORGANIZED HEALTH CARE EDUCATION/TRAINING PROGRAM

## 2021-09-25 PROCEDURE — 36415 COLL VENOUS BLD VENIPUNCTURE: CPT

## 2021-09-25 PROCEDURE — 96368 THER/DIAG CONCURRENT INF: CPT

## 2021-09-25 PROCEDURE — 83735 ASSAY OF MAGNESIUM: CPT

## 2021-09-25 RX ORDER — METRONIDAZOLE 500 MG/1
500 TABLET ORAL EVERY 8 HOURS SCHEDULED
Status: DISCONTINUED | OUTPATIENT
Start: 2021-09-25 | End: 2021-09-25 | Stop reason: HOSPADM

## 2021-09-25 RX ORDER — PANTOPRAZOLE SODIUM 40 MG/1
40 TABLET, DELAYED RELEASE ORAL
Status: DISCONTINUED | OUTPATIENT
Start: 2021-09-26 | End: 2021-09-25 | Stop reason: HOSPADM

## 2021-09-25 RX ORDER — CIPROFLOXACIN 500 MG/1
500 TABLET, FILM COATED ORAL 2 TIMES DAILY
Qty: 8 TABLET | Refills: 0 | Status: SHIPPED | OUTPATIENT
Start: 2021-09-25 | End: 2021-09-29

## 2021-09-25 RX ORDER — METRONIDAZOLE 500 MG/1
500 TABLET ORAL 3 TIMES DAILY
Qty: 12 TABLET | Refills: 0 | Status: SHIPPED | OUTPATIENT
Start: 2021-09-25 | End: 2021-09-29

## 2021-09-25 RX ORDER — CIPROFLOXACIN 500 MG/1
500 TABLET, FILM COATED ORAL EVERY 12 HOURS SCHEDULED
Status: DISCONTINUED | OUTPATIENT
Start: 2021-09-25 | End: 2021-09-25 | Stop reason: HOSPADM

## 2021-09-25 RX ADMIN — CIPROFLOXACIN 400 MG: 2 INJECTION INTRAVENOUS at 03:48

## 2021-09-25 RX ADMIN — PANTOPRAZOLE SODIUM 40 MG: 40 INJECTION, POWDER, FOR SOLUTION INTRAVENOUS at 09:37

## 2021-09-25 RX ADMIN — Medication 10 ML: at 09:37

## 2021-09-25 RX ADMIN — SODIUM CHLORIDE 10 ML: 9 INJECTION, SOLUTION INTRAMUSCULAR; INTRAVENOUS; SUBCUTANEOUS at 09:37

## 2021-09-25 RX ADMIN — METRONIDAZOLE 500 MG: 500 INJECTION, SOLUTION INTRAVENOUS at 03:08

## 2021-09-25 ASSESSMENT — PAIN SCALES - GENERAL
PAINLEVEL_OUTOF10: 0
PAINLEVEL_OUTOF10: 0

## 2021-09-25 NOTE — PLAN OF CARE
Problem: Pain:  Goal: Pain level will decrease  Description: Pain level will decrease  9/25/2021 1320 by Manpreet Rodarte RN  Outcome: Met This Shift  9/25/2021 0243 by Coco Hdz RN  Outcome: Met This Shift  Goal: Control of acute pain  Description: Control of acute pain  Outcome: Met This Shift  Goal: Control of chronic pain  Description: Control of chronic pain  Outcome: Met This Shift     Problem: Bleeding:  Goal: Will show no signs and symptoms of excessive bleeding  Description: Will show no signs and symptoms of excessive bleeding  Outcome: Met This Shift     Problem: Physical Regulation:  Goal: Ability to maintain vital signs within normal range will improve  Description: Ability to maintain vital signs within normal range will improve  Outcome: Met This Shift     Problem: Respiratory:  Goal: Ability to maintain normal respiratory secretions will improve  Description: Ability to maintain normal respiratory secretions will improve  Outcome: Met This Shift     Problem: Skin Integrity:  Goal: Demonstration of wound healing without infection will improve  Description: Demonstration of wound healing without infection will improve  Outcome: Met This Shift  Goal: Complications related to intravenous access or infusion will be avoided or minimized  Description: Complications related to intravenous access or infusion will be avoided or minimized  Outcome: Met This Shift

## 2021-09-25 NOTE — PROGRESS NOTES
[facility]  Internal Medicine Residency Program  Progress Note - House Team 1    Patient:  Phil Galvan 60 y.o. male MRN: [id number]     Date of Service: 9/25/2021     CC: LLQ abdominal pain, bloody bowel movements  Overnight events: NAEO    Subjective     Patient seen and assessed at the bedside. NAEO. Patient denies any further abdominal pain. He tolerated the colonoscopy well yesterday. He has been passing flatus but has not yet had a bowel movement. He was transitioned to regular low fiber diet and tolerating well with strong appetite. He denies any nausea or vomiting, or lightheadedness, dizziness, or worsening abdominal pain. No new complaints per the patient. Objective     · Vitals: /78   Pulse 64   Temp 97.4 °F (36.3 °C) (Oral)   Resp 18   Ht 6' 2\" (1.88 m)   Wt 206 lb (93.4 kg)   SpO2 99%   BMI 26.45 kg/m²     · I & O - 24hr: No intake/output data recorded. Physical Exam  Constitutional:       Appearance: Normal appearance. HENT:      Head: Normocephalic and atraumatic. Mouth/Throat:      Mouth: Mucous membranes are moist.      Pharynx: Oropharynx is clear. Eyes:      Extraocular Movements: Extraocular movements intact. Conjunctiva/sclera: Conjunctivae normal.      Pupils: Pupils are equal, round, and reactive to light. Cardiovascular:      Rate and Rhythm: Normal rate and regular rhythm. Heart sounds: Normal heart sounds. Pulmonary:      Effort: Pulmonary effort is normal.      Breath sounds: Normal breath sounds. Abdominal:      General: Abdomen is flat. Bowel sounds are normal. There is no distension. Palpations: Abdomen is soft. There is no mass. Tenderness: There is no abdominal tenderness. There is no guarding. Musculoskeletal:         General: Normal range of motion. Cervical back: Normal range of motion and neck supple. Skin:     General: Skin is warm and dry.       Capillary Refill: Capillary refill takes less than 2 seconds. Neurological:      General: No focal deficit present. Mental Status: He is alert and oriented to person, place, and time. Psychiatric:         Mood and Affect: Mood normal.     Subject  Pertinent Labs & Imaging Studies   yazan  CBC with Differential:    Lab Results   Component Value Date    WBC 6.9 09/25/2021    RBC 4.50 09/25/2021    HGB 12.8 09/25/2021    HCT 38.1 09/25/2021     09/25/2021    MCV 84.7 09/25/2021    MCH 28.4 09/25/2021    MCHC 33.6 09/25/2021    RDW 13.2 09/25/2021    SEGSPCT 68 09/15/2013    LYMPHOPCT 18.8 09/25/2021    MONOPCT 7.9 09/25/2021    BASOPCT 0.7 09/25/2021    MONOSABS 0.55 09/25/2021    LYMPHSABS 1.30 09/25/2021    EOSABS 0.40 09/25/2021    BASOSABS 0.05 09/25/2021     CMP:    Lab Results   Component Value Date     09/25/2021    K 4.0 09/25/2021     09/25/2021    CO2 21 09/25/2021    BUN 11 09/25/2021    CREATININE 0.9 09/25/2021    GFRAA >60 09/25/2021    LABGLOM >60 09/25/2021    GLUCOSE 93 09/25/2021    PROT 6.4 09/25/2021    LABALBU 3.5 09/25/2021    CALCIUM 8.5 09/25/2021    BILITOT <0.2 09/25/2021    ALKPHOS 86 09/25/2021    AST 17 09/25/2021    ALT 21 09/25/2021     Magnesium:    Lab Results   Component Value Date    MG 2.1 09/25/2021     Phosphorus:    Lab Results   Component Value Date    PHOS 3.2 09/25/2021     Imaging:    XR CHEST PORTABLE [[id number]] Collected: 09/24/21 1122      Order Status: Completed Updated: 09/24/21 1125     Narrative:       EXAMINATION:   ONE XRAY VIEW OF THE CHEST     9/24/2021 11:18 am     COMPARISON:   08/06/2021     HISTORY:   ORDERING SYSTEM PROVIDED HISTORY: pre-op   TECHNOLOGIST PROVIDED HISTORY:   Reason for exam:->pre-op   What reading provider will be dictating this exam?->CRC     FINDINGS:   The lungs are without acute focal process.  There is no effusion or   pneumothorax. The cardiomediastinal silhouette is without acute process.  The   osseous structures are without acute process.      Impression:       No acute process.      CT ABDOMEN PELVIS W IV CONTRAST Additional Contrast? None [[id number]] Collected: 09/21/21 2310     Order Status: Completed Updated: 09/21/21 2335     Narrative:       EXAMINATION:   CT OF THE ABDOMEN AND PELVIS WITH CONTRAST 9/21/2021 10:51 pm     TECHNIQUE:   CT of the abdomen and pelvis was performed with the administration of   intravenous contrast. Multiplanar reformatted images are provided for review. Dose modulation, iterative reconstruction, and/or weight based adjustment of   the mA/kV was utilized to reduce the radiation dose to as low as reasonably   achievable. COMPARISON:   05/06/2019 enhanced abdominopelvic CT. HISTORY:   ORDERING SYSTEM PROVIDED HISTORY: abdominal pain   TECHNOLOGIST PROVIDED HISTORY:   Reason for exam:->abdominal pain   Additional Contrast?->None   Decision Support Exception - unselect if not a suspected or confirmed   emergency medical condition->Emergency Medical Condition (MA)   What reading provider will be dictating this exam?->CRC     FINDINGS:   Lower Chest: Minimal subsegmental dependent atelectasis bilaterally.  No   effusion or infiltrate within the field of view. Organs: Cholecystectomy clips.  Few scattered tiny too small to characterize   focal hypodensities are seen within the liver, probably cysts.  No worrisome   focal hepatic lesion.  No significant abnormality is identified of the   spleen, pancreas, adrenal glands or right kidney.  Possible small incidental   parapelvic cyst or 2 within the lower pole of the left kidney, with the   kidneys otherwise unremarkable.      GI/Bowel: Unremarkable stomach.  No acute specific small bowel abnormality is   identified.  On axial image 64, there is a small renal form nodular focus   Thersia Ortiz within 8 mm short axis lymph node at lateral right lateral margin   of the duodenal bulb.  Sagittal images similarly favor an abutting lymph node   as opposed to exophytic wall nodule, not significantly intervally changed in   size compared to 05/06/2019.  The cecum and native ileocecal valve are   present, however there appears to be a surgical ileocolic anastomosis within   the right upper quadrant.  The rectum is within normal limits for degree of   under distension.  There is abnormal edematous colonic wall thickening seen   involving the descending colon and perhaps most proximal sigmoid colon,   consistent with regional colitis. Shelda Clock is associated pericolic fat   stranding, with trace fluid seen in the left pericolic gutter.  No   pneumatosis, abscess or evidence of bowel rupture. Pelvis: The bladder is suboptimally evaluated due to relative luminal   collapse, however without bladder stones or findings to suggest cystitis. The prostate gland is enlarged, measuring 4.8 x 3.8 cm transaxially.  Seminal   vesicles are symmetric, within normal limits. Peritoneum/Retroperitoneum: No free intraperitoneal air.  No aortic aneurysm   or acute vascular abnormality. Bones/Soft Tissues: No acute soft tissue abnormality is identified within the   field of view.  A small, uncomplicated fat containing right inguinal hernia   is incidentally noted.  No acute osseous abnormality.      Impression:       Findings consistent with regional colitis involving the descending and most   proximal sigmoid colon. No pneumatosis, abscess or evidence of bowel rupture. Non-specific enlargement of the prostate gland, hyperplasia versus neoplasia,   requiring clinical correlation. Other chronic/nonemergent findings, as described. Resident's Assessment and Plan     Bernabe Mcallister is a 60 y. o. 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     1. Gastrointestinal Bleed 2/2 Diverticulitis vs. Malignancy vs. Ischemic Colitis  - Patient describes a 2-day history of maroon colored stools and bright red blood with clots. He endorses left-sided abdominal pain that radiates to the groin. He denies any previous incidence or history of inflammatory bowel disease.  - FOBT positive. Likely lower GI bleed. - No recent colonoscopy noted. - CT showed findings consistent with colitis of the descending and sigmoid colon. - Denies bloody stool since 9/22 morning.  - Patient has remained hemodynamically stable during stay. Today, hgb 12.8.  - Day 4 of IV Cipro and Flagyl. Discharge on oral Cipro and Flagyl for total of 7 days abx therapy. - Stool culture only shows gram-positive enteric pete. - Stool negative for WBC's. - Rotavirus antigen negative. - Blood cultures negative. - Coloscopy revealed sigmoid colitis; several biopsies taken. Follow-up on results outpatient.     2. Leukocytosis  - Likely 2/2 colitis. - WBC is WNL. - Resolved on abx therapy.     3.  Hyperlipidemia  - LDL noted at 119.  - ASCVD 7.6  - Outpatient follow-up on statin therapy.     4. Full Code     PT/OT evaluation: [facility] 24/24  DVT prophylaxis/ GI prophylaxis: None/Protonix  Disposition: Pending discharge.     Vy Anne  Attending physician: Marina Gonzalez

## 2021-09-25 NOTE — PLAN OF CARE
Problem: Pain:  Goal: Pain level will decrease  Description: Pain level will decrease  9/25/2021 0243 by Pedro Luis Bergeron RN  Outcome: Met This Shift

## 2021-09-25 NOTE — PROGRESS NOTES
General Surgery Progress Note    S:  Patient seen and examined this morning. No abdominal pain. Discussed findings from colonoscopy with the patient. Having flatus. No bowel movements. Still on IV antibiotics. O:  General: lying in bed, responds to questions appropriately  CV: warm throughout, normocardic per peripheral pulse  Resp: breathing comfortably on RA  Abd: soft, mildly distended, NTTP throughout without rebound or guarding  Extremities: MAEx4    A/P:  59yo M who presented with abdominal pain and sigmoid colitis on imaging now s/p colonoscopy yesterday with colitis at 20-40cm with biopsies taken. Recommend to continue antibiotics. Can switch to PO cipro and flagyl at this point for total of 7d. Please call with any questions or concerns.       Hilario Gomez MD  PGY-4

## 2021-09-25 NOTE — PROGRESS NOTES
[facility]  PROGRESS NOTE  ATTENDING NOTE    Likely ischemic colitis  Colonoscopy completed yesterday, biopsies taken  No further bleeding  Abd:  Soft, NT, ND      Low fiber diet  Cipro/flagyl  Ok to discharge home    Lg Means MD, MSc, FACS  9/25/2021  9:53 AM

## 2021-09-27 ENCOUNTER — TELEPHONE (OUTPATIENT)
Dept: INTERNAL MEDICINE | Age: 56
End: 2021-09-27

## 2021-09-27 LAB
BLOOD CULTURE, ROUTINE: NORMAL
CULTURE, BLOOD 2: NORMAL

## 2021-09-27 NOTE — TELEPHONE ENCOUNTER
[facility] Initial Follow Up Call    Outreach made within 2 business days of discharge: Yes    Patient: Jimena Jara Patient : 1965   MRN: <[id number]>  Reason for Admission: There are no discharge diagnoses documented for the most recent discharge. Discharge Date: 21       Spoke with: Kami Gomez    Discharge department/facility: [facility] Interactive Patient Contact:  Was patient able to fill all prescriptions: Yes  Was patient instructed to bring all medications to the follow-up visit: Yes  Is patient taking all medications as directed in the discharge summary?  Yes  Does patient understand their discharge instructions: Yes  Does patient have questions or concerns that need addressed prior to 7-14 day follow up office visit: no    Scheduled appointment with PCP within 7-14 days    Follow Up  Future Appointments   Date Time Provider Bird Medrano   2021  9:30 AM Navarro Sanchez MD [place], [place]

## 2021-09-27 NOTE — PROGRESS NOTES
[facility] Internal Medicine      SUBJECTIVE:  Maurice Kemp (:  1965) is a 60 y.o. male here for evaluation of the following chief complaint(s): hospital follow up for ischemic colitis  No chief complaint on file. The patient is a 60y.o. year old male presenting with abdominal pain and sigmoid colitis on imaging. Patient underwent colonoscopy which revealed large diverticulum in distal transverse colon near splenic flexure and Descending/Sigmoid colon: inflammation from 40cm to 20cm. Vitals stable and general surgery recommended to complete antibiotics for 7 days included ciprofloxacin 500 mg Bid and Metronidazole 500 mg 3 times daily. Review of Systems    Current Outpatient Medications on File Prior to Visit   Medication Sig Dispense Refill    metroNIDAZOLE (FLAGYL) 500 MG tablet Take 1 tablet by mouth 3 times daily for 4 days 12 tablet 0    ciprofloxacin (CIPRO) 500 MG tablet Take 1 tablet by mouth 2 times daily for 4 days 8 tablet 0     No current facility-administered medications on file prior to visit. OBJECTIVE:    VS: There were no vitals filed for this visit. Physical Exam       ASSESSMENT/PLAN:  {There are no diagnoses linked to this encounter. (Refresh or delete this SmartLink)}     RTC:  No follow-ups on file. I have reviewed my findings and recommendations with Maurice Kemp and Dr. Khalif Brown Attendings:[id number]}.     Scott Parisi MD   2021 11:21 AM

## 2021-09-28 ENCOUNTER — OFFICE VISIT (OUTPATIENT)
Dept: INTERNAL MEDICINE | Age: 56
End: 2021-09-28
Payer: MEDICAID

## 2021-09-28 VITALS
DIASTOLIC BLOOD PRESSURE: 87 MMHG | RESPIRATION RATE: 18 BRPM | BODY MASS INDEX: 26.85 KG/M2 | HEART RATE: 66 BPM | WEIGHT: 209.2 LBS | OXYGEN SATURATION: 98 % | SYSTOLIC BLOOD PRESSURE: 120 MMHG | HEIGHT: 74 IN | TEMPERATURE: 97.3 F

## 2021-09-28 DIAGNOSIS — K57.32 SIGMOID DIVERTICULITIS: ICD-10-CM

## 2021-09-28 DIAGNOSIS — Z00.00 HEALTHCARE MAINTENANCE: Primary | ICD-10-CM

## 2021-09-28 PROCEDURE — 99212 OFFICE O/P EST SF 10 MIN: CPT | Performed by: INTERNAL MEDICINE

## 2021-09-28 PROCEDURE — 1111F DSCHRG MED/CURRENT MED MERGE: CPT | Performed by: INTERNAL MEDICINE

## 2021-09-28 PROCEDURE — 6360000002 HC RX W HCPCS

## 2021-09-28 PROCEDURE — 90686 IIV4 VACC NO PRSV 0.5 ML IM: CPT

## 2021-09-28 PROCEDURE — 99213 OFFICE O/P EST LOW 20 MIN: CPT | Performed by: INTERNAL MEDICINE

## 2021-09-28 PROCEDURE — G0008 ADMIN INFLUENZA VIRUS VAC: HCPCS

## 2021-09-28 ASSESSMENT — ENCOUNTER SYMPTOMS
VOMITING: 0
ABDOMINAL PAIN: 0
CONSTIPATION: 0
BACK PAIN: 0
COUGH: 0
RHINORRHEA: 0
CHOKING: 0
CHEST TIGHTNESS: 0
NAUSEA: 0
ABDOMINAL DISTENTION: 0
SORE THROAT: 0
WHEEZING: 0
DIARRHEA: 0
BLOOD IN STOOL: 0
SHORTNESS OF BREATH: 0

## 2021-09-28 NOTE — PROGRESS NOTES
[facility]  Internal Medicine Residency Clinic    Attending Physician Statement  I have discussed the case, including pertinent history and exam findings with the resident physician. I agree with the assessment, plan and orders as documented by the resident. I have reviewed all pertinent PMHx, PSHx, FamHx, SocialHx, medications, and allergies and updated history as appropriate. Patient presents for hospital follow up appointment. Pt was admitted for rectal bleeding related to colitis and diverticulosis, treated with IV Cipro and PO cipro, C-scope done in admission, pending biopsy result. Pt reported semiformed stool, pending surgery follow up at this time. ROS is negative and denies any compliant. Recommended to complete the course of Cipro and Metronidazole. Total time spent 25 minutes in this visit. Remainder of medical problems as per resident note.       Darion Linda MD  9/28/2021 10:36 AM

## 2021-09-28 NOTE — PROGRESS NOTES
Post-Discharge Transitional Care Management Services or Hospital Follow Up      Lara Haile   YOB: 1965    Date of Office Visit:  2021  Date of Hospital Admission: 21  Date of Hospital Discharge: 21  Readmission Risk Score(high >=14%.  Medium >=10%):Readmission Risk Score: 12      Care management risk score Rising risk (score 2-5) and Complex Care (Scores >=6): 4     Non face to face  following discharge, date last encounter closed (first attempt may have been earlier): 2021 10:22 AM 2021 10:22 AM    Call initiated 2 business days of discharge: Yes     Patient Active Problem List   Diagnosis    Pulmonary nodules    History of cholecystectomy    Pneumonia    Actinic keratosis    Lesion of skin of nose    GI bleed    Colitis    Rectal bleeding    Left lower quadrant abdominal pain    Ischemic colitis ([facility] [address]       Allergies   Allergen Reactions    Asa [Aspirin] Anaphylaxis    Pcn [Penicillins] Anaphylaxis    Rye Grass Flower Pollen Extract [Gramineae Pollens]     Albuterol Sulfate Rash       Medications listed as ordered at the time of discharge from hospital   Sydney Rued   Home Medication Instructions RV    Printed on:21 1050   Medication Information                      ciprofloxacin (CIPRO) 500 MG tablet  Take 1 tablet by mouth 2 times daily for 4 days             metroNIDAZOLE (FLAGYL) 500 MG tablet  Take 1 tablet by mouth 3 times daily for 4 days                   Medications marked \"taking\" at this time  Outpatient Medications Marked as Taking for the 21 encounter (Office Visit) with Morenita Kapoor MD   Medication Sig Dispense Refill    metroNIDAZOLE (FLAGYL) 500 MG tablet Take 1 tablet by mouth 3 times daily for 4 days 12 tablet 0    ciprofloxacin (CIPRO) 500 MG tablet Take 1 tablet by mouth 2 times daily for 4 days 8 tablet 0        Medications patient taking as of now reconciled against medications ordered at time of hospital discharge: Yes    Chief Complaint   Patient presents with    Follow-Up from Hospital     for Divedrticulosis, no further rectal bleeding       HPI    Inpatient course: Discharge summary reviewed- see chart. The patient came in to the ED 09/21/21 with the chief complaint of LLQ pain and rectal bleeding x 2 days. CT abdomen/pelvis showed: colitis of descending and proximal colon. IV ciprofloxacin and metronidazole were started. C-scope showed: large diverticulum in distal transverse colon near splenic flexure. Inflammation: 40-20cm, biopsies taken. Switched to PO cipro and Flagyl on 09/25 for total of 7 days, end date: 09/29/21. Surgical pathology in process. Last CBC, BMP 09/25: WNL. Don't see any evidence of \"ischemic colitis\" within the general surgeon's note or within the hospital admission. Last seen in office 08/10/2021 for skin lesions assessed to be: actinic keratosis. Significant PMH of intussusception as a child s/p removal of small part of SI. Also s/p cholecystectomy 01/25/2019. Interval history/Current status:   -doing well, afebrile, no chills, no abdominal pain, having semi-formed stools, no blood in stools, resumed diet in full, no nausea/vomiting. No rectal bleeds. Picked up the antibiotics yesterday and started ABx yesterday night, to be continued for 4 days to complete a total of 7 days of treatment.  -needs to make the appointment with     Review of Systems   Constitutional: Negative for activity change, appetite change, chills, diaphoresis, fatigue, fever and unexpected weight change. HENT: Negative for postnasal drip, rhinorrhea, sneezing and sore throat. Respiratory: Negative for cough, choking, chest tightness, shortness of breath and wheezing. Cardiovascular: Negative for chest pain, palpitations and leg swelling. Gastrointestinal: Negative for abdominal distention, abdominal pain, blood in stool, constipation, diarrhea, nausea and vomiting.    Genitourinary: consistent with regional colitis involving the descending and most   proximal sigmoid colon     Per [facility] is was actually ischemic colitis per their colonocsopy:'  9/25 note: \"Likely ischemic colitis  Colonoscopy completed - biopsies taken  No further bleeding\"  bx result:  Diagnosis:   Sigmoid colon, biopsy: Ischemic colitis     Fecal calprotection 297-- which brings up more possibiltiy of IBD  But bx shows ischemic coliitis  ?preceeeded by hypotensive episode? ??   stable now  hgb 12.8    Flu shot discussed    x2 covid vaccine  pulm nodule fu--in June CTA negative for nodules -- but did show  \"Right upper lobe pneumonia. \"    Medical Decision Making: low complexity

## 2021-09-28 NOTE — PATIENT INSTRUCTIONS
1. Please complete your 4 days of antibiotics (Flagyl and Ciprofloxacin) that you started yesterday night (09/27/21). 2. Please make sure to schedule an appointment with general surgery.

## 2021-09-28 NOTE — PROGRESS NOTES
Patient verbalized understanding of office instructions. He will call with questions or concerns. Pt was given discharge instructions, all questions were fully answered.   Flu vaccine given without no distress noted both printed AVS and VIS was given

## 2021-10-04 NOTE — PROGRESS NOTES
[facility]  Internal Medicine Clinic    Attending Physician Statement:  Delonte Lemon M.D., F.A.C.P. I have discussed the case, including pertinent history and exam findings with the resident. I agree with the assessment, plan and orders as documented by the resident. Patient is seen for fu visit today. - delayed documentation  Last office notes reviewed, relative labs and imaging. Health maintenance issues of vaccinations, depression screening, tobacco cessation etc... covered    Remainder of medical problems as per resident note. I have also been involved in case-- hosptial fu within7 days  ?need to finish cipro and flagyl    Told rectal bleed due to \"diverticulosis\"--but CT suggested colitis? Findings consistent with regional colitis involving the descending and most   proximal sigmoid colon     Per [facility] is was actually ischemic colitis per their colonocsopy:'  9/25 note: \"Likely ischemic colitis  Colonoscopy completed - biopsies taken  No further bleeding\"  bx result:  Diagnosis:   Sigmoid colon, biopsy: Ischemic colitis     Fecal calprotection 297-- which brings up more possibiltiy of IBD  But bx shows ischemic coliitis  ?preceeeded by hypotensive episode? ??   stable now  hgb 12.8    Flu shot discussed    x2 covid vaccine      pulm nodule fu--in June CTA negative for nodules -- but did show  \"Right upper lobe pneumonia. \"-- likely resolved  But fu cxr- 9/24/21-- now normal--The lungs are without acute focal process.    Medical Decision Making: low-moderate complexity

## 2021-11-01 ENCOUNTER — OFFICE VISIT (OUTPATIENT)
Dept: SURGERY | Age: 56
End: 2021-11-01
Payer: MEDICAID

## 2021-11-01 VITALS
RESPIRATION RATE: 12 BRPM | SYSTOLIC BLOOD PRESSURE: 120 MMHG | WEIGHT: 208.2 LBS | BODY MASS INDEX: 26.72 KG/M2 | HEIGHT: 74 IN | TEMPERATURE: 98.4 F | HEART RATE: 64 BPM | DIASTOLIC BLOOD PRESSURE: 88 MMHG

## 2021-11-01 DIAGNOSIS — K55.9 ISCHEMIC COLITIS (HCC): ICD-10-CM

## 2021-11-01 DIAGNOSIS — L91.8 SKIN TAG: Primary | ICD-10-CM

## 2021-11-01 DIAGNOSIS — K64.1 GRADE II HEMORRHOIDS: ICD-10-CM

## 2021-11-01 PROCEDURE — 11400 EXC TR-EXT B9+MARG 0.5 CM<: CPT | Performed by: SURGERY

## 2021-11-01 RX ORDER — LIDOCAINE HYDROCHLORIDE AND EPINEPHRINE 10; 10 MG/ML; UG/ML
20 INJECTION, SOLUTION INFILTRATION; PERINEURAL ONCE
Status: COMPLETED | OUTPATIENT
Start: 2021-11-01 | End: 2021-11-01

## 2021-11-01 RX ADMIN — LIDOCAINE HYDROCHLORIDE AND EPINEPHRINE 5 ML: 10; 10 INJECTION, SOLUTION INFILTRATION; PERINEURAL at 12:03

## 2021-11-01 NOTE — PROGRESS NOTES
[facility]  PROGRESS NOTE  ATTENDING NOTE    Chief Complaint   Patient presents with    Colonoscopy     Colonoscopy 09/24/2021, F/U ischemic colitis. Pt states doing good.  Hemorrhoids     Pt states having flare up of hemorrhoids again     S: 49-year-old male presents for follow-up after his colonoscopy. I went over his colonoscopy findings and his biopsies. There is no evidence of Crohn's disease or colitis just ischemic colitis. He states that he is having some intermittent bleeding he has been having some constipation he has been using Tucks for his hemorrhoids and that seems to be helping. /88 (Site: Right Upper Arm, Position: Sitting, Cuff Size: Medium Adult)   Pulse 64   Temp 98.4 °F (36.9 °C) (Temporal)   Resp 12   Ht 6' 2\" (1.88 m)   Wt 208 lb 3.2 oz (94.4 kg)   BMI 26.73 kg/m²   Gen:  NAD  Rectal: Grade 2 internal hemorrhoids no gross blood on exam, right buttock pedunculated mass patient states it has been there since he has been a teenager he wishes to have it removed today    PROCEDURE NOTE  Consent obtained. Patient was prepped and draped in standard surgical fashion. Medical assistant in the room assisting. Anesthetized the base of the mass with 1% lidocaine with epinephrine. I used a 15 blade scalpel to excise the skin tag at the base. It was sent to pathology. I then cauterized the base of the wound with silver nitrate. Applied Band-Aid there was minimal bleeding patient tolerated procedure well. Total size of the mass was 5 mm x 5 mm    ASSESSMENT/PLAN:  Ischemic colitispatient does not appear to have any risk factors and advised to avoid secondhand smoke  Right buttock mass follow-up pathology Will call patient with results  Internal hemorrhoids, grade 2continue to avoid constipation with taking MiraLAX and Senokot. Continue using Tucks pads for symptoms relief    Return to clinic as needed we will call patient with pathology report. I advised that if his hemorrhoids worsened that we can do hemorrhoid banding in the office and I went over how this is accomplished with the patient.     Sandoval Bustamante MD, MSc, FACS  11/1/2021  5:25 PM

## 2021-11-01 NOTE — LETTER
[facility] General Surgery  [address] [address]  Phone: [phone]  Fax: [phone]    Sandoval Bustamante MD        November 1, 2021     Patient: Rajan Wong   YOB: 1965   Date of Visit: 11/1/2021       To Whom It May Concern: It is my medical opinion that Carleen Quintanilla should be excused from for today, Monday November 1, 2021 as he had a follow-up physician appointment and in-office procedure. Ok to return to work tomorrow without restrictions. .    If you have any questions or concerns, please don't hesitate to call.     Sincerely,        Sandoval Bustamante MD

## 2021-11-08 ENCOUNTER — TELEPHONE (OUTPATIENT)
Dept: SURGERY | Age: 56
End: 2021-11-08

## 2021-11-09 NOTE — TELEPHONE ENCOUNTER
I called patient and gave him his pathology report. Benign. He is doing well. No issues with the wound.

## 2021-11-18 NOTE — PROGRESS NOTES
[facility]  Internal Medicine Residency Program  [facility] Note      SUBJECTIVE:  PMH:  Shortness of breath, allergy to albuterol, deferred PFT, chest x-ray clean, history of right middle lobe pneumonia  Hx of ischemic colitis with rectal bleeding; s/p colonoscopy, sigmoid colitis  Hemorrhoids  Diverticulosis    CC: had concerns including Other. HPI:Bernabe Mcallister presented to the [facility] for a routine visit    No acute complaints. No constipation, diarrhea, abdominal pain. No weight loss, appetite normal.     COVID-19 booster [place] get in [place]  Flu vaccine got this season. Review Of Systems:  General: no fevers, chills, weight loss or gain. Ears/Nose/Throat: no hearing loss, tinnitus, vertigo, nosebleed, nasal congestion, rhinorrhea, sore throat  Respiratory: no cough, pleuritic chest pain, dyspnea, or wheezing  Cardiovascular: no chest pain, angina, dyspnea on exertion, orthopnea, PND, palpitations, or claudication  Gastrointestinal: no nausea, vomiting, heartburn, diarrhea, constipation, abdominal pain, hematochezia or melena  Genitourinary: no urinary urgency, frequency, dysuria, nocturia, hesitancy, or incontinence  Musculoskeletal: no arthritis, arthralgia, myalgia, weakness, or morning stiffness  Skin: no abnormal pigmentation, rash, itching, masses, hair or nail changes    Current Outpatient Medications on File Prior to Visit   Medication Sig Dispense Refill    diphenhydrAMINE (BENADRYL) 25 MG tablet Take 25 mg by mouth every 6 hours as needed for Itching      ibuprofen (ADVIL;MOTRIN) 200 MG tablet Take 200 mg by mouth every 6 hours as needed for Pain       No current facility-administered medications on file prior to visit.        OBJECTIVE:    VS: /77 (Site: Right Upper Arm, Position: Sitting, Cuff Size: Medium Adult)   Pulse 74   Temp 97.2 °F (36.2 °C) (Temporal)   Resp 20   Ht 6' 2\" (1.88 m)   Wt 204 lb 12.8 oz (92.9 kg)   SpO2 95% Comment: room air  BMI 26.29 kg/m²   General appearance: Alert, Awake, Oriented times 3, no distress  Head: atraumatic, normocephalic  Neck: no JVD, trachea midline, no adenopathy  Ear: bilateral grossly equal hearing, external ear normal  Eyes: bilateral pupils are equal, round and reactive, no icterus, no pallor, EOM intact, no nystagmus  Lungs: Lungs clear to auscultation bilaterally. No rhonchi, crackles or wheezes  Heart: S1 S2  Regular rate and rhythm. No rub, murmur or gallop  Abdomen: Abdomen soft but obese, non-tender. non-distended BS normal. No masses, organomegaly, no guarding rebound or rigidity. Extremities: No edema, Peripheral pulses palpable 2/4  Hematology: no petechia, purpura or ecchymoses, no adenopathy  Neurology: CN grossly intact, power/reflex/bulk bilateral equal in all extremities  Psych: normal behaviour, thought process intact, sleep normal, anhedonia absent, normal energy, normal concentration, no suicidal ideation    ASSESSMENT/PLAN:  Dai Suarez was seen today for other. Diagnoses and all orders for this visit:    Need for shingles vaccine  -     zoster recombinant adjuvanted vaccine [facility]) 50 MCG/0.5ML SUSR injection; Inject 0.5 mLs into the muscle See Admin Instructions 1 dose now and repeat in 2-6 months    Grade II hemorrhoids  Ischemic colitis ([address]  No current complaints   Continue to monitor for symtoms      I have reviewed all pertient PMHx, PSHx, FamHx, Social Hx, medications, and allergies and updated history as appropriate. RTC: 6 months    I have reviewed my findings and recommendations with Viraj Silverio and Dr. Manuel Abbott.     Navarro Segovia MD, MD PGY-3   11/19/2021 10:39 AM

## 2021-11-19 ENCOUNTER — OFFICE VISIT (OUTPATIENT)
Dept: INTERNAL MEDICINE | Age: 56
End: 2021-11-19
Payer: MEDICAID

## 2021-11-19 VITALS
DIASTOLIC BLOOD PRESSURE: 77 MMHG | TEMPERATURE: 97.2 F | BODY MASS INDEX: 26.28 KG/M2 | SYSTOLIC BLOOD PRESSURE: 110 MMHG | WEIGHT: 204.8 LBS | OXYGEN SATURATION: 95 % | HEIGHT: 74 IN | HEART RATE: 74 BPM | RESPIRATION RATE: 20 BRPM

## 2021-11-19 DIAGNOSIS — Z23 NEED FOR SHINGLES VACCINE: Primary | ICD-10-CM

## 2021-11-19 DIAGNOSIS — K64.1 GRADE II HEMORRHOIDS: ICD-10-CM

## 2021-11-19 DIAGNOSIS — K55.9 ISCHEMIC COLITIS (HCC): ICD-10-CM

## 2021-11-19 PROBLEM — J18.9 PNEUMONIA: Status: RESOLVED | Noted: 2021-06-08 | Resolved: 2021-11-19

## 2021-11-19 PROCEDURE — G8482 FLU IMMUNIZE ORDER/ADMIN: HCPCS | Performed by: INTERNAL MEDICINE

## 2021-11-19 PROCEDURE — G8427 DOCREV CUR MEDS BY ELIG CLIN: HCPCS | Performed by: INTERNAL MEDICINE

## 2021-11-19 PROCEDURE — 1036F TOBACCO NON-USER: CPT | Performed by: INTERNAL MEDICINE

## 2021-11-19 PROCEDURE — 99212 OFFICE O/P EST SF 10 MIN: CPT | Performed by: INTERNAL MEDICINE

## 2021-11-19 PROCEDURE — 3017F COLORECTAL CA SCREEN DOC REV: CPT | Performed by: INTERNAL MEDICINE

## 2021-11-19 PROCEDURE — G8419 CALC BMI OUT NRM PARAM NOF/U: HCPCS | Performed by: INTERNAL MEDICINE

## 2021-11-19 RX ORDER — ZOSTER VACCINE RECOMBINANT, ADJUVANTED 50 MCG/0.5
0.5 KIT INTRAMUSCULAR SEE ADMIN INSTRUCTIONS
Qty: 0.5 ML | Refills: 0 | Status: SHIPPED | OUTPATIENT
Start: 2021-11-19 | End: 2022-05-18

## 2021-11-19 RX ORDER — IBUPROFEN 200 MG
400 TABLET ORAL EVERY 6 HOURS PRN
Status: ON HOLD | COMMUNITY

## 2021-11-19 RX ORDER — DIPHENHYDRAMINE HCL 25 MG
25 TABLET ORAL EVERY 6 HOURS PRN
Status: ON HOLD | COMMUNITY

## 2021-11-19 NOTE — PROGRESS NOTES
[id number] [facility]  Internal Medicine Residency Clinic    Attending Physician Statement  I have discussed the case, including pertinent history and exam findings with the resident physician. I agree with the assessment, plan and orders as documented by the resident. I have reviewed all pertinent PMHx, PSHx, FamHx, SocialHx, medications, and allergies and updated history as appropriate. Patient presents for routine follow up of medical problems. Patient is currently on no meds and had an isolated episode of GI bleeding a few months ago, has hemorrhoids and colonoscopy with biopsy revealed ischemic colitis. He has not had any significant GI complaints since then. Remainder of medical problems as per resident note.     Rafael Power DO  11/19/2021 10:37 AM

## 2021-11-19 NOTE — PATIENT INSTRUCTIONS
1. Prescription for shingles vaccination has been sent to [facility]. 2. Consider getting COVID-19 booster vaccination as planned. Follow-up in 6-month. Please call [phone] around May to schedule follow up for July if you don't receive any calls from the office.

## 2021-11-30 ENCOUNTER — HOSPITAL ENCOUNTER (EMERGENCY)
Age: 56
Discharge: HOME | End: 2021-11-30
Payer: MEDICAID

## 2021-11-30 ENCOUNTER — APPOINTMENT (OUTPATIENT)
Dept: GENERAL RADIOLOGY | Age: 56
End: 2021-11-30
Payer: MEDICAID

## 2021-11-30 VITALS
HEART RATE: 86 BPM | DIASTOLIC BLOOD PRESSURE: 86 MMHG | BODY MASS INDEX: 26.19 KG/M2 | OXYGEN SATURATION: 97 % | TEMPERATURE: 98.2 F | RESPIRATION RATE: 18 BRPM | WEIGHT: 204 LBS | SYSTOLIC BLOOD PRESSURE: 137 MMHG

## 2021-11-30 DIAGNOSIS — U07.1 COVID-19: Primary | ICD-10-CM

## 2021-11-30 LAB
ALBUMIN SERPL-MCNC: 4.2 G/DL (ref 3.5–5.2)
ALP BLD-CCNC: 97 U/L (ref 40–129)
ALT SERPL-CCNC: 39 U/L (ref 0–40)
ANION GAP SERPL CALCULATED.3IONS-SCNC: 13 MMOL/L (ref 7–16)
AST SERPL-CCNC: 34 U/L (ref 0–39)
BASOPHILS ABSOLUTE: 0.04 E9/L (ref 0–0.2)
BASOPHILS RELATIVE PERCENT: 0.8 % (ref 0–2)
BILIRUB SERPL-MCNC: 0.3 MG/DL (ref 0–1.2)
BUN BLDV-MCNC: 17 MG/DL (ref 6–20)
CALCIUM SERPL-MCNC: 9.3 MG/DL (ref 8.6–10.2)
CHLORIDE BLD-SCNC: 102 MMOL/L (ref 98–107)
CO2: 23 MMOL/L (ref 22–29)
CREAT SERPL-MCNC: 1 MG/DL (ref 0.7–1.2)
D DIMER: <200 NG/ML DDU
EOSINOPHILS ABSOLUTE: 0.16 E9/L (ref 0.05–0.5)
EOSINOPHILS RELATIVE PERCENT: 3.1 % (ref 0–6)
GFR AFRICAN AMERICAN: >60
GFR NON-AFRICAN AMERICAN: >60 ML/MIN/1.73
GLUCOSE BLD-MCNC: 92 MG/DL (ref 74–99)
HCT VFR BLD CALC: 45.3 % (ref 37–54)
HEMOGLOBIN: 14.8 G/DL (ref 12.5–16.5)
IMMATURE GRANULOCYTES #: 0.02 E9/L
IMMATURE GRANULOCYTES %: 0.4 % (ref 0–5)
LYMPHOCYTES ABSOLUTE: 1.2 E9/L (ref 1.5–4)
LYMPHOCYTES RELATIVE PERCENT: 23.4 % (ref 20–42)
MCH RBC QN AUTO: 29 PG (ref 26–35)
MCHC RBC AUTO-ENTMCNC: 32.7 % (ref 32–34.5)
MCV RBC AUTO: 88.6 FL (ref 80–99.9)
MONOCYTES ABSOLUTE: 0.76 E9/L (ref 0.1–0.95)
MONOCYTES RELATIVE PERCENT: 14.8 % (ref 2–12)
NEUTROPHILS ABSOLUTE: 2.95 E9/L (ref 1.8–7.3)
NEUTROPHILS RELATIVE PERCENT: 57.5 % (ref 43–80)
PDW BLD-RTO: 13.7 FL (ref 11.5–15)
PLATELET # BLD: 228 E9/L (ref 130–450)
PMV BLD AUTO: 10.6 FL (ref 7–12)
POTASSIUM SERPL-SCNC: 4.8 MMOL/L (ref 3.5–5)
RBC # BLD: 5.11 E12/L (ref 3.8–5.8)
SARS-COV-2, NAAT: DETECTED
SODIUM BLD-SCNC: 138 MMOL/L (ref 132–146)
TOTAL PROTEIN: 7.7 G/DL (ref 6.4–8.3)
WBC # BLD: 5.1 E9/L (ref 4.5–11.5)

## 2021-11-30 PROCEDURE — 85378 FIBRIN DEGRADE SEMIQUANT: CPT

## 2021-11-30 PROCEDURE — 85025 COMPLETE CBC W/AUTO DIFF WBC: CPT

## 2021-11-30 PROCEDURE — 87635 SARS-COV-2 COVID-19 AMP PRB: CPT

## 2021-11-30 PROCEDURE — 71046 X-RAY EXAM CHEST 2 VIEWS: CPT

## 2021-11-30 PROCEDURE — 80053 COMPREHEN METABOLIC PANEL: CPT

## 2021-11-30 PROCEDURE — 99283 EMERGENCY DEPT VISIT LOW MDM: CPT

## 2021-11-30 ASSESSMENT — PAIN DESCRIPTION - DESCRIPTORS: DESCRIPTORS: ACHING

## 2021-11-30 ASSESSMENT — PAIN SCALES - GENERAL: PAINLEVEL_OUTOF10: 5

## 2021-11-30 ASSESSMENT — PAIN DESCRIPTION - ORIENTATION: ORIENTATION: RIGHT

## 2021-11-30 ASSESSMENT — PAIN DESCRIPTION - LOCATION: LOCATION: ARM;SHOULDER

## 2021-11-30 ASSESSMENT — PAIN DESCRIPTION - PAIN TYPE: TYPE: ACUTE PAIN

## 2021-11-30 NOTE — Clinical Note
Jozef Brody was seen and treated in our emergency department on 11/30/2021. He may return to work on 12/13/2021. If you have any questions or concerns, please don't hesitate to call.       Heike Gilmore, RALF - CNP

## 2021-12-01 ENCOUNTER — CARE COORDINATION (OUTPATIENT)
Dept: CARE COORDINATION | Age: 56
End: 2021-12-01

## 2021-12-01 NOTE — ED PROVIDER NOTES
Independent P    HPI: Wang Solis is a 60 y.o. male with a past medical history of  has no past medical history on file. presenting with complaints of a cough with nasal congestion. The patient states that these symptoms began gradually. The history is obtained from the patient. The patient states that he has had some subjective chills at home. Patient does complain of a mild cough associated with it that is nonproductive. Patient denies excessive fatigue or sleeping greater than 18 hours a day. Patient denies exposure to mononucleosis. The patient denies any abdominal pain, left upper quadrant fullness, or early satiety. The patient also denies difficulty breathing, hemoptysis, neck pain/stiffness, or blurry vision. Sx have persisted and are mildly worse which is what prompted the visit today. unknown exposure to sick contacts. Complaining of myalgia with cough and states he needs a test for Covid before he can return back to work. He is fully vaccinated. He denies any fever chest pain or shortness of breath. Denies any recent travel.        ROS:   Pertinent positives and negatives are stated within HPI, all other systems reviewed and are negative.      --------------------------------------------- PAST HISTORY ---------------------------------------------  Past Medical History:  has no past medical history on file. Past Surgical History:  has a past surgical history that includes Abdomen surgery; Appendectomy (1970); Abdominal adhesion surgery; Cholecystectomy, laparoscopic (N/A, 1/25/2019); and Colonoscopy (N/A, 9/24/2021). Social History:  reports that he has never smoked. He has never used smokeless tobacco. He reports current alcohol use. He reports that he does not use drugs. Family History: family history includes Breast Cancer in his mother; Cancer in his father; Kidney Disease in his father. The patients home medications have been reviewed.     Allergies: Asa [aspirin], Pcn [penicillins], Rye grass flower pollen extract [gramineae pollens], and Albuterol sulfate    ------------------------- NURSING NOTES AND VITALS REVIEWED ---------------------------   The nursing notes within the ED encounter and vital signs as below have been reviewed by myself. /86   Pulse 86   Temp 98.2 °F (36.8 °C) (Oral)   Resp 18   Wt 204 lb (92.5 kg)   SpO2 97%   BMI 26.19 kg/m²   Oxygen Saturation Interpretation: Normal    The patients available past medical records and past encounters were reviewed. Physical exam:  Constitutional: Vital signs are reviewed the patient is comfortable. The patient is alert and oriented and conversant. Head: The head is atraumatic and normocephalic. Eyes: No discharge is present from the eyes. The sclera are normal.  ENT: The oropharynx demonstrates a small amount of erythema bilaterally. There is no tonsillar enlargement nor is there any exudate present. No uvular deviation or edema. No tonsillary asymmetry.  Floor of the mouth soft, no trismus, handling secretions. TMs bilaterally demonstrate no evidence of infection. Neck: Normal range of motion is achieved in the neck. There is no JVD present. No meningeal signs are present   Anterior cervical adenopathy is normal.  Respiratory/chest: The chest is nontender. Breath sounds are normal. There is no respiratory distress.   Cardiovascular: Heart shows a regular rate and rhythm without murmurs clicks or gallops. Abdominal exam: The abdomen is non tender without evidence of peritoneal signs.  Specific attention to the left upper quadrant with palpation of the spleen demonstrates no organomegaly or tenderness  Skin: warm and dry, without rash  Neurologic: GCS 15   Psych: Normal Affect  -------------------------------------------------- RESULTS -------------------------------------------------    LABS:  Results for orders placed or performed during the hospital encounter of 11/30/21   COVID-19, Rapid Specimen: Nasopharyngeal Swab   Result Value Ref Range    SARS-CoV-2, NAAT DETECTED (A) Not Detected   CBC Auto Differential   Result Value Ref Range    WBC 5.1 4.5 - 11.5 E9/L    RBC 5.11 3.80 - 5.80 E12/L    Hemoglobin 14.8 12.5 - 16.5 g/dL    Hematocrit 45.3 37.0 - 54.0 %    MCV 88.6 80.0 - 99.9 fL    MCH 29.0 26.0 - 35.0 pg    MCHC 32.7 32.0 - 34.5 %    RDW 13.7 11.5 - 15.0 fL    Platelets 692 094 - 555 E9/L    MPV 10.6 7.0 - 12.0 fL    Neutrophils % 57.5 43.0 - 80.0 %    Immature Granulocytes % 0.4 0.0 - 5.0 %    Lymphocytes % 23.4 20.0 - 42.0 %    Monocytes % 14.8 (H) 2.0 - 12.0 %    Eosinophils % 3.1 0.0 - 6.0 %    Basophils % 0.8 0.0 - 2.0 %    Neutrophils Absolute 2.95 1.80 - 7.30 E9/L    Immature Granulocytes # 0.02 E9/L    Lymphocytes Absolute 1.20 (L) 1.50 - 4.00 E9/L    Monocytes Absolute 0.76 0.10 - 0.95 E9/L    Eosinophils Absolute 0.16 0.05 - 0.50 E9/L    Basophils Absolute 0.04 0.00 - 0.20 E9/L   Comprehensive Metabolic Panel   Result Value Ref Range    Sodium 138 132 - 146 mmol/L    Potassium 4.8 3.5 - 5.0 mmol/L    Chloride 102 98 - 107 mmol/L    CO2 23 22 - 29 mmol/L    Anion Gap 13 7 - 16 mmol/L    Glucose 92 74 - 99 mg/dL    BUN 17 6 - 20 mg/dL    CREATININE 1.0 0.7 - 1.2 mg/dL    GFR Non-African American >60 >=60 mL/min/1.73    GFR African American >60     Calcium 9.3 8.6 - 10.2 mg/dL    Total Protein 7.7 6.4 - 8.3 g/dL    Albumin 4.2 3.5 - 5.2 g/dL    Total Bilirubin 0.3 0.0 - 1.2 mg/dL    Alkaline Phosphatase 97 40 - 129 U/L    ALT 39 0 - 40 U/L    AST 34 0 - 39 U/L   D-Dimer, Quantitative   Result Value Ref Range    D-Dimer, Quant <200 ng/mL DDU       RADIOLOGY:  Interpreted by Radiologist.  XR CHEST (2 VW)   Final Result   No acute process.                  ------------------------------ ED COURSE/MEDICAL DECISION MAKING----------------------  Medications - No data to display          Medical Decision Making:      I ambulated the patient in the department his pulse oximetry maintained between 95 and 97% and heart rate was 86 to 88%. He denies any shortness of breath with ambulation. His COVID-19 is positive. Labs are otherwise normal.  Plan will be for discharge to home instructions to follow-up with primary care physician advised to maintain social.  If any worsening symptoms to return back to the emergency room. Not hypoxic, nothing to suggests pneumonia. Well appearing, non toxic, appropriate for outpatient management. Plan is for symptom management and PCP follow up.         This patient's ED course included: a personal history and physicial eaxmination and re-evaluation prior to disposition    This patient has remained hemodynamically stable during their ED course. Counseling: The emergency provider has spoken with the patient and discussed todays results, in addition to providing specific details for the plan of care and counseling regarding the diagnosis and prognosis.   Questions are answered at this time and they are agreeable with the plan.       --------------------------------- IMPRESSION AND DISPOSITION ---------------------------------    IMPRESSION  1. COVID-19        DISPOSITION  Disposition: Discharge to home  Patient condition is good              Juany Zimmerman, APRN - CNP  11/30/21 0683

## 2021-12-01 NOTE — CARE COORDINATION
Patient contacted regarding COVID-19 diagnosis. Discussed COVID-19 related testing which was available at this time. Test results were positive. Patient informed of results, if available? Yes. Ambulatory Care Manager contacted the patient by telephone to perform post discharge assessment. Call within 2 business days of discharge: Yes. Verified name and  with patient as identifiers. Provided introduction to self, and explanation of the CTN/ACM role, and reason for call due to risk factors for infection and/or exposure to COVID-19. Symptoms reviewed with patient who verbalized the following symptoms: pain or aching joints and cough. Due to no new or worsening symptoms encounter was not routed to provider for escalation. Discussed follow-up appointments. If no appointment was previously scheduled, appointment scheduling offered: Yes. [facility] follow up appointment(s): No future appointments. Non-[facility] follow up appointment(s): n/a    Non-face-to-face services provided:  Obtained and reviewed discharge summary and/or continuity of care documents     Advance Care Planning:   Does patient have an Advance Directive:  reviewed and current. Educated patient about risk for severe COVID-19 due to risk factors according to CDC guidelines. ACM reviewed discharge instructions, medical action plan and red flag symptoms with the patient who verbalized understanding. Discussed COVID vaccination status: Yes. Education provided on COVID-19 vaccination as appropriate. Discussed exposure protocols and quarantine with CDC Guidelines. Patient was given an opportunity to verbalize any questions and concerns and agrees to contact ACM or health care provider for questions related to their healthcare. Reviewed and educated patient on any new and changed medications related to discharge diagnosis     Was patient discharged with a pulse oximeter?  No Discussed and confirmed pulse oximeter discharge instructions and when to notify provider or seek emergency care. ACM provided contact information. No further follow-up call identified based on severity of symptoms and risk factors. Patient understands CDC guidelines and is able to repeat them. Patient verbalizes understanding of suggestions for follow up from ED AVS and has number to do so  Patient has no new or worsening symptoms. Patient wishes no further calls at this time from [facility]. Patient has contact information and encouraged to contact ACM should there be any questions or concerns. Episode to be closed at this time.

## 2022-11-04 ENCOUNTER — OFFICE VISIT (OUTPATIENT)
Dept: INTERNAL MEDICINE | Age: 57
DRG: 351 | End: 2022-11-04
Payer: MEDICAID

## 2022-11-04 ENCOUNTER — HOSPITAL ENCOUNTER (INPATIENT)
Age: 57
LOS: 2 days | Discharge: HOME | DRG: 351 | End: 2022-11-06
Attending: EMERGENCY MEDICINE | Admitting: INTERNAL MEDICINE
Payer: MEDICAID

## 2022-11-04 ENCOUNTER — APPOINTMENT (OUTPATIENT)
Dept: GENERAL RADIOLOGY | Age: 57
DRG: 351 | End: 2022-11-04
Payer: MEDICAID

## 2022-11-04 VITALS
TEMPERATURE: 97.9 F | HEART RATE: 61 BPM | OXYGEN SATURATION: 97 % | WEIGHT: 209.5 LBS | SYSTOLIC BLOOD PRESSURE: 113 MMHG | DIASTOLIC BLOOD PRESSURE: 75 MMHG | BODY MASS INDEX: 26.89 KG/M2 | HEIGHT: 74 IN | RESPIRATION RATE: 18 BRPM

## 2022-11-04 DIAGNOSIS — L03.012 CELLULITIS OF FINGER OF LEFT HAND: Primary | ICD-10-CM
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
## 2022-11-04 PROBLEM — L03.90 CELLULITIS: Status: ACTIVE | Noted: 2022-11-04

## 2022-11-04 LAB
ANION GAP SERPL CALCULATED.3IONS-SCNC: 11 MMOL/L (ref 7–16)
BASOPHILS ABSOLUTE: 0.04 E9/L (ref 0–0.2)
BASOPHILS RELATIVE PERCENT: 0.4 % (ref 0–2)
BUN BLDV-MCNC: 13 MG/DL (ref 6–20)
C-REACTIVE PROTEIN: 4.8 MG/DL (ref 0–0.4)
CALCIUM SERPL-MCNC: 9.8 MG/DL (ref 8.6–10.2)
CHLORIDE BLD-SCNC: 106 MMOL/L (ref 98–107)
CO2: 23 MMOL/L (ref 22–29)
CREAT SERPL-MCNC: 0.8 MG/DL (ref 0.7–1.2)
EOSINOPHILS ABSOLUTE: 0.25 E9/L (ref 0.05–0.5)
EOSINOPHILS RELATIVE PERCENT: 2.2 % (ref 0–6)
GFR SERPL CREATININE-BSD FRML MDRD: >60 ML/MIN/1.73
GLUCOSE BLD-MCNC: 98 MG/DL (ref 74–99)
HCT VFR BLD CALC: 41.4 % (ref 37–54)
HEMOGLOBIN: 14.6 G/DL (ref 12.5–16.5)
IMMATURE GRANULOCYTES #: 0.02 E9/L
IMMATURE GRANULOCYTES %: 0.2 % (ref 0–5)
LACTIC ACID, SEPSIS: 1 MMOL/L (ref 0.5–1.9)
LYMPHOCYTES ABSOLUTE: 1.57 E9/L (ref 1.5–4)
LYMPHOCYTES RELATIVE PERCENT: 13.9 % (ref 20–42)
MCH RBC QN AUTO: 31.5 PG (ref 26–35)
MCHC RBC AUTO-ENTMCNC: 35.3 % (ref 32–34.5)
MCV RBC AUTO: 89.4 FL (ref 80–99.9)
MONOCYTES ABSOLUTE: 0.88 E9/L (ref 0.1–0.95)
MONOCYTES RELATIVE PERCENT: 7.8 % (ref 2–12)
NEUTROPHILS ABSOLUTE: 8.5 E9/L (ref 1.8–7.3)
NEUTROPHILS RELATIVE PERCENT: 75.5 % (ref 43–80)
PDW BLD-RTO: 13.3 FL (ref 11.5–15)
PLATELET # BLD: 258 E9/L (ref 130–450)
PMV BLD AUTO: 10.6 FL (ref 7–12)
POTASSIUM SERPL-SCNC: 4.3 MMOL/L (ref 3.5–5)
RBC # BLD: 4.63 E12/L (ref 3.8–5.8)
SEDIMENTATION RATE, ERYTHROCYTE: 47 MM/HR (ref 0–15)
SODIUM BLD-SCNC: 140 MMOL/L (ref 132–146)
WBC # BLD: 11.3 E9/L (ref 4.5–11.5)

## 2022-11-04 PROCEDURE — G8484 FLU IMMUNIZE NO ADMIN: HCPCS

## 2022-11-04 PROCEDURE — 96367 TX/PROPH/DG ADDL SEQ IV INF: CPT

## 2022-11-04 PROCEDURE — 85651 RBC SED RATE NONAUTOMATED: CPT

## 2022-11-04 PROCEDURE — G8419 CALC BMI OUT NRM PARAM NOF/U: HCPCS

## 2022-11-04 PROCEDURE — G8427 DOCREV CUR MEDS BY ELIG CLIN: HCPCS

## 2022-11-04 PROCEDURE — 6360000002 HC RX W HCPCS: Performed by: STUDENT IN AN ORGANIZED HEALTH CARE EDUCATION/TRAINING PROGRAM

## 2022-11-04 PROCEDURE — 6370000000 HC RX 637 (ALT 250 FOR IP): Performed by: INTERNAL MEDICINE

## 2022-11-04 PROCEDURE — 1036F TOBACCO NON-USER: CPT

## 2022-11-04 PROCEDURE — 85025 COMPLETE CBC W/AUTO DIFF WBC: CPT

## 2022-11-04 PROCEDURE — G0378 HOSPITAL OBSERVATION PER HR: HCPCS

## 2022-11-04 PROCEDURE — 83605 ASSAY OF LACTIC ACID: CPT

## 2022-11-04 PROCEDURE — 90714 TD VACC NO PRESV 7 YRS+ IM: CPT | Performed by: STUDENT IN AN ORGANIZED HEALTH CARE EDUCATION/TRAINING PROGRAM

## 2022-11-04 PROCEDURE — 87081 CULTURE SCREEN ONLY: CPT

## 2022-11-04 PROCEDURE — 96366 THER/PROPH/DIAG IV INF ADDON: CPT

## 2022-11-04 PROCEDURE — 96375 TX/PRO/DX INJ NEW DRUG ADDON: CPT

## 2022-11-04 PROCEDURE — 2580000003 HC RX 258: Performed by: STUDENT IN AN ORGANIZED HEALTH CARE EDUCATION/TRAINING PROGRAM

## 2022-11-04 PROCEDURE — 96365 THER/PROPH/DIAG IV INF INIT: CPT

## 2022-11-04 PROCEDURE — 99215 OFFICE O/P EST HI 40 MIN: CPT

## 2022-11-04 PROCEDURE — 99222 1ST HOSP IP/OBS MODERATE 55: CPT | Performed by: INTERNAL MEDICINE

## 2022-11-04 PROCEDURE — 87040 BLOOD CULTURE FOR BACTERIA: CPT

## 2022-11-04 PROCEDURE — 99285 EMERGENCY DEPT VISIT HI MDM: CPT

## 2022-11-04 PROCEDURE — 2580000003 HC RX 258: Performed by: INTERNAL MEDICINE

## 2022-11-04 PROCEDURE — 80048 BASIC METABOLIC PNL TOTAL CA: CPT

## 2022-11-04 PROCEDURE — 2500000003 HC RX 250 WO HCPCS: Performed by: STUDENT IN AN ORGANIZED HEALTH CARE EDUCATION/TRAINING PROGRAM

## 2022-11-04 PROCEDURE — 2500000003 HC RX 250 WO HCPCS: Performed by: INTERNAL MEDICINE

## 2022-11-04 PROCEDURE — 73130 X-RAY EXAM OF HAND: CPT

## 2022-11-04 PROCEDURE — 86140 C-REACTIVE PROTEIN: CPT

## 2022-11-04 PROCEDURE — 99214 OFFICE O/P EST MOD 30 MIN: CPT

## 2022-11-04 PROCEDURE — 3017F COLORECTAL CA SCREEN DOC REV: CPT

## 2022-11-04 PROCEDURE — 90471 IMMUNIZATION ADMIN: CPT | Performed by: STUDENT IN AN ORGANIZED HEALTH CARE EDUCATION/TRAINING PROGRAM

## 2022-11-04 PROCEDURE — 1200000000 HC SEMI PRIVATE

## 2022-11-04 PROCEDURE — 2580000003 HC RX 258: Performed by: EMERGENCY MEDICINE

## 2022-11-04 RX ORDER — ONDANSETRON 4 MG/1
4 TABLET, ORALLY DISINTEGRATING ORAL EVERY 8 HOURS PRN
Status: DISCONTINUED | OUTPATIENT
Start: 2022-11-04 | End: 2022-11-06 | Stop reason: HOSPADM

## 2022-11-04 RX ORDER — ACETAMINOPHEN 650 MG/1
650 SUPPOSITORY RECTAL EVERY 6 HOURS PRN
Status: DISCONTINUED | OUTPATIENT
Start: 2022-11-04 | End: 2022-11-06 | Stop reason: HOSPADM

## 2022-11-04 RX ORDER — SODIUM CHLORIDE 0.9 % (FLUSH) 0.9 %
5-40 SYRINGE (ML) INJECTION PRN
Status: DISCONTINUED | OUTPATIENT
Start: 2022-11-04 | End: 2022-11-04 | Stop reason: SDUPTHER

## 2022-11-04 RX ORDER — METRONIDAZOLE 500 MG/100ML
500 INJECTION, SOLUTION INTRAVENOUS ONCE
Status: COMPLETED | OUTPATIENT
Start: 2022-11-04 | End: 2022-11-04

## 2022-11-04 RX ORDER — TETANUS AND DIPHTHERIA TOXOIDS ADSORBED 2; 2 [LF]/.5ML; [LF]/.5ML
0.5 INJECTION INTRAMUSCULAR ONCE
Status: COMPLETED | OUTPATIENT
Start: 2022-11-04 | End: 2022-11-04

## 2022-11-04 RX ORDER — FENTANYL CITRATE 50 UG/ML
50 INJECTION, SOLUTION INTRAMUSCULAR; INTRAVENOUS ONCE
Status: COMPLETED | OUTPATIENT
Start: 2022-11-04 | End: 2022-11-04

## 2022-11-04 RX ORDER — POLYETHYLENE GLYCOL 3350 17 G/17G
17 POWDER, FOR SOLUTION ORAL DAILY PRN
Status: DISCONTINUED | OUTPATIENT
Start: 2022-11-04 | End: 2022-11-06 | Stop reason: HOSPADM

## 2022-11-04 RX ORDER — ENOXAPARIN SODIUM 100 MG/ML
40 INJECTION SUBCUTANEOUS DAILY
Status: DISCONTINUED | OUTPATIENT
Start: 2022-11-04 | End: 2022-11-06 | Stop reason: HOSPADM

## 2022-11-04 RX ORDER — SODIUM CHLORIDE 9 MG/ML
INJECTION, SOLUTION INTRAVENOUS PRN
Status: DISCONTINUED | OUTPATIENT
Start: 2022-11-04 | End: 2022-11-06 | Stop reason: HOSPADM

## 2022-11-04 RX ORDER — SODIUM CHLORIDE 0.9 % (FLUSH) 0.9 %
10 SYRINGE (ML) INJECTION EVERY 12 HOURS SCHEDULED
Status: DISCONTINUED | OUTPATIENT
Start: 2022-11-04 | End: 2022-11-06 | Stop reason: HOSPADM

## 2022-11-04 RX ORDER — ACETAMINOPHEN 325 MG/1
650 TABLET ORAL EVERY 6 HOURS PRN
Status: DISCONTINUED | OUTPATIENT
Start: 2022-11-04 | End: 2022-11-06 | Stop reason: HOSPADM

## 2022-11-04 RX ORDER — SODIUM CHLORIDE 0.9 % (FLUSH) 0.9 %
10 SYRINGE (ML) INJECTION PRN
Status: DISCONTINUED | OUTPATIENT
Start: 2022-11-04 | End: 2022-11-06 | Stop reason: HOSPADM

## 2022-11-04 RX ORDER — ONDANSETRON 2 MG/ML
4 INJECTION INTRAMUSCULAR; INTRAVENOUS EVERY 6 HOURS PRN
Status: DISCONTINUED | OUTPATIENT
Start: 2022-11-04 | End: 2022-11-06 | Stop reason: HOSPADM

## 2022-11-04 RX ORDER — METRONIDAZOLE 500 MG/100ML
500 INJECTION, SOLUTION INTRAVENOUS EVERY 8 HOURS
Status: DISCONTINUED | OUTPATIENT
Start: 2022-11-04 | End: 2022-11-06 | Stop reason: HOSPADM

## 2022-11-04 RX ORDER — SODIUM CHLORIDE 0.9 % (FLUSH) 0.9 %
5-40 SYRINGE (ML) INJECTION EVERY 12 HOURS SCHEDULED
Status: DISCONTINUED | OUTPATIENT
Start: 2022-11-04 | End: 2022-11-04 | Stop reason: SDUPTHER

## 2022-11-04 RX ORDER — SODIUM CHLORIDE 9 MG/ML
INJECTION, SOLUTION INTRAVENOUS PRN
Status: DISCONTINUED | OUTPATIENT
Start: 2022-11-04 | End: 2022-11-04 | Stop reason: SDUPTHER

## 2022-11-04 RX ADMIN — Medication 10 ML: at 21:28

## 2022-11-04 RX ADMIN — VANCOMYCIN HYDROCHLORIDE 2000 MG: 10 INJECTION, POWDER, LYOPHILIZED, FOR SOLUTION INTRAVENOUS at 13:28

## 2022-11-04 RX ADMIN — FENTANYL CITRATE 50 MCG: 0.05 INJECTION, SOLUTION INTRAMUSCULAR; INTRAVENOUS at 11:40

## 2022-11-04 RX ADMIN — TETANUS AND DIPHTHERIA TOXOIDS ADSORBED 0.5 ML: 2; 2 INJECTION INTRAMUSCULAR at 11:38

## 2022-11-04 RX ADMIN — METRONIDAZOLE 500 MG: 500 INJECTION, SOLUTION INTRAVENOUS at 18:43

## 2022-11-04 RX ADMIN — CEFTRIAXONE 2000 MG: 2 INJECTION, POWDER, FOR SOLUTION INTRAMUSCULAR; INTRAVENOUS at 11:42

## 2022-11-04 RX ADMIN — ACETAMINOPHEN 650 MG: 325 TABLET ORAL at 21:27

## 2022-11-04 RX ADMIN — METRONIDAZOLE 500 MG: 500 INJECTION, SOLUTION INTRAVENOUS at 11:46

## 2022-11-04 RX ADMIN — SODIUM CHLORIDE, PRESERVATIVE FREE 10 ML: 5 INJECTION INTRAVENOUS at 11:47

## 2022-11-04 SDOH — ECONOMIC STABILITY: HOUSING INSECURITY: IN THE LAST 12 MONTHS, HOW MANY PLACES HAVE YOU LIVED?: 1

## 2022-11-04 SDOH — ECONOMIC STABILITY: FOOD INSECURITY: WITHIN THE PAST 12 MONTHS, THE FOOD YOU BOUGHT JUST DIDN'T LAST AND YOU DIDN'T HAVE MONEY TO GET MORE.: NEVER TRUE

## 2022-11-04 SDOH — ECONOMIC STABILITY: TRANSPORTATION INSECURITY
IN THE PAST 12 MONTHS, HAS LACK OF TRANSPORTATION KEPT YOU FROM MEETINGS, WORK, OR FROM GETTING THINGS NEEDED FOR DAILY LIVING?: NO

## 2022-11-04 SDOH — ECONOMIC STABILITY: TRANSPORTATION INSECURITY
IN THE PAST 12 MONTHS, HAS THE LACK OF TRANSPORTATION KEPT YOU FROM MEDICAL APPOINTMENTS OR FROM GETTING MEDICATIONS?: NO

## 2022-11-04 SDOH — ECONOMIC STABILITY: INCOME INSECURITY: IN THE LAST 12 MONTHS, WAS THERE A TIME WHEN YOU WERE NOT ABLE TO PAY THE MORTGAGE OR RENT ON TIME?: NO

## 2022-11-04 SDOH — ECONOMIC STABILITY: HOUSING INSECURITY
IN THE LAST 12 MONTHS, WAS THERE A TIME WHEN YOU DID NOT HAVE A STEADY PLACE TO SLEEP OR SLEPT IN A SHELTER (INCLUDING NOW)?: NO

## 2022-11-04 SDOH — ECONOMIC STABILITY: FOOD INSECURITY: WITHIN THE PAST 12 MONTHS, YOU WORRIED THAT YOUR FOOD WOULD RUN OUT BEFORE YOU GOT MONEY TO BUY MORE.: NEVER TRUE

## 2022-11-04 ASSESSMENT — ENCOUNTER SYMPTOMS
CHEST TIGHTNESS: 0
ANAL BLEEDING: 0
EYE REDNESS: 0
BACK PAIN: 0
CONSTIPATION: 0
FACIAL SWELLING: 0
EYE ITCHING: 0
COUGH: 0
COUGH: 0
STRIDOR: 0
DIARRHEA: 0
RHINORRHEA: 0
SHORTNESS OF BREATH: 0
CHOKING: 0
RHINORRHEA: 0
EYE DISCHARGE: 0
NAUSEA: 0
WHEEZING: 0
SINUS PAIN: 0
SHORTNESS OF BREATH: 0
BACK PAIN: 0
ABDOMINAL PAIN: 0
SINUS PRESSURE: 0
ABDOMINAL DISTENTION: 0
VOMITING: 0
SORE THROAT: 0

## 2022-11-04 ASSESSMENT — PAIN DESCRIPTION - LOCATION
LOCATION: HAND

## 2022-11-04 ASSESSMENT — PATIENT HEALTH QUESTIONNAIRE - PHQ9
SUM OF ALL RESPONSES TO PHQ9 QUESTIONS 1 & 2: 0
SUM OF ALL RESPONSES TO PHQ QUESTIONS 1-9: 0
SUM OF ALL RESPONSES TO PHQ QUESTIONS 1-9: 0
1. LITTLE INTEREST OR PLEASURE IN DOING THINGS: 0
SUM OF ALL RESPONSES TO PHQ QUESTIONS 1-9: 0
SUM OF ALL RESPONSES TO PHQ QUESTIONS 1-9: 0
2. FEELING DOWN, DEPRESSED OR HOPELESS: 0

## 2022-11-04 ASSESSMENT — PAIN SCALES - GENERAL
PAINLEVEL_OUTOF10: 5
PAINLEVEL_OUTOF10: 9
PAINLEVEL_OUTOF10: 10
PAINLEVEL_OUTOF10: 7

## 2022-11-04 ASSESSMENT — PAIN DESCRIPTION - ORIENTATION
ORIENTATION: LEFT
ORIENTATION: LEFT

## 2022-11-04 ASSESSMENT — LIFESTYLE VARIABLES
HOW MANY STANDARD DRINKS CONTAINING ALCOHOL DO YOU HAVE ON A TYPICAL DAY: PATIENT DOES NOT DRINK
HOW OFTEN DO YOU HAVE A DRINK CONTAINING ALCOHOL: NEVER
HOW MANY STANDARD DRINKS CONTAINING ALCOHOL DO YOU HAVE ON A TYPICAL DAY: PATIENT DOES NOT DRINK
HOW OFTEN DO YOU HAVE A DRINK CONTAINING ALCOHOL: NEVER

## 2022-11-04 ASSESSMENT — SOCIAL DETERMINANTS OF HEALTH (SDOH): HOW HARD IS IT FOR YOU TO PAY FOR THE VERY BASICS LIKE FOOD, HOUSING, MEDICAL CARE, AND HEATING?: SOMEWHAT HARD

## 2022-11-04 ASSESSMENT — PAIN - FUNCTIONAL ASSESSMENT: PAIN_FUNCTIONAL_ASSESSMENT: 0-10

## 2022-11-04 ASSESSMENT — PAIN DESCRIPTION - DESCRIPTORS: DESCRIPTORS: ACHING;BURNING

## 2022-11-04 NOTE — LETTER
Zack Marks  [facility] [address]  [address] [address]  Phone: [phone]      November 6, 2022     Patient: Evette Berg   YOB: 1965   Date of Visit: 11/4/2022       To Whom It May Concern: It is my medical opinion that Neno Salazar should remain out of work until seen in [facility] and cleared by his PCP to return to work. .    If you have any questions or concerns, please don't hesitate to call.     Sincerely,      Electronically signed by Jodee Beltran MD on 11/6/2022 at 8:17 AM

## 2022-11-04 NOTE — ED PROVIDER NOTES
HPI     Patient is a 59 y.o. male presents with a chief complaint of hand pain. This has been occurring for 9 days. Patient states that it gets better with nothing. Patient states that it gets worse with time. Patient states that it is moderate in severity. Patient states it was acute in onset. Patient was sent over for hand pain. Patient has been bit by a cat approximately 8 or 9 days ago on the left hand. Patient stated that he has not had any antibiotics. Patient was pulling a kitten out of his dog's mouth. Patient was scratched by the kitten and bit in the right hand. Patient is right-handed. Patient does not have any fevers or chills. Patient has no chest pain or shortness of breath. Worsening pain in his left hand. Review of Systems   Constitutional:  Negative for activity change, appetite change, fatigue and fever. HENT:  Negative for congestion, rhinorrhea, sinus pressure and sinus pain. Eyes:  Negative for discharge. Respiratory:  Negative for cough, chest tightness and shortness of breath. Cardiovascular:  Negative for chest pain and palpitations. Gastrointestinal:  Negative for abdominal distention, abdominal pain, anal bleeding, constipation, diarrhea, nausea and vomiting. Endocrine: Negative for polydipsia and polyuria. Genitourinary:  Negative for decreased urine volume, difficulty urinating, enuresis, flank pain, frequency and urgency. Musculoskeletal:  Positive for arthralgias and myalgias. Negative for back pain and neck stiffness. Skin:  Positive for wound. Negative for rash. Neurological:  Negative for dizziness, weakness, light-headedness and headaches. Psychiatric/Behavioral:  Negative for agitation, behavioral problems and confusion. Physical Exam  Vitals and nursing note reviewed. Constitutional:       Appearance: He is well-developed. HENT:      Head: Normocephalic and atraumatic.    Eyes:      Conjunctiva/sclera: Conjunctivae normal. Cardiovascular:      Rate and Rhythm: Normal rate and regular rhythm. Heart sounds: Normal heart sounds. No murmur heard. Pulmonary:      Effort: Pulmonary effort is normal. No respiratory distress. Breath sounds: Normal breath sounds. No wheezing or rales. Abdominal:      General: Bowel sounds are normal.      Palpations: Abdomen is soft. Tenderness: There is no abdominal tenderness. There is no guarding or rebound. Musculoskeletal:         General: Tenderness present. No deformity. Cervical back: Normal range of motion and neck supple. Comments: Redness and swelling of the fourth and fifth digit of the left hand. Tenderness along the anterior tendon sheath both fingers. Erythema and warmth. Tenderness on the dorsal aspect as well. Significant pain with flexion. Skin:     General: Skin is warm and dry. Neurological:      Mental Status: He is alert and oriented to person, place, and time. Cranial Nerves: No cranial nerve deficit. Coordination: Coordination normal.        Procedures     MDM           Patient is a 59 y.o. male presenting with. Patient has anaphylaxis to penicillins. Patient was started on broad-spectrum antibiotics. Consulted orthopedic surgery. Patient had labs drawn. Patient's inflammatory markers are elevated. Patient was hemodynamically stable. Orthopedic surgery wanted patient admitted for antibiotics. Patient will be admitted to internal medicine. Discussed case with internal medicine agreed to admit patient. Patient was given tetanus. No signs of compartment syndrome. Patient will be admitted for observation and possible washout by orthopedic surgery. Patient was seen and evaluated by myself and my attending Dr. Orlin Mcmillan. Assessment and Plan discussed with attending provider, please see attestation for final plan of care.   This note was done using dictation software and there may be some grammatical errors associated with this.    Tr Kendall MD         --------------------------------------------- PAST HISTORY ---------------------------------------------  Past Medical History:  has no past medical history on file. Past Surgical History:  has a past surgical history that includes Abdomen surgery; Appendectomy (1970); Abdominal adhesion surgery; Cholecystectomy, laparoscopic (N/A, 1/25/2019); and Colonoscopy (N/A, 9/24/2021). Social History:  reports that he has never smoked. He has never used smokeless tobacco. He reports current alcohol use. He reports that he does not use drugs. Family History: family history includes Breast Cancer in his mother; Cancer in his father; Kidney Disease in his father. The patients home medications have been reviewed.     Allergies: Asa [aspirin], Pcn [penicillins], Rye grass flower pollen extract [gramineae pollens], and Albuterol sulfate    -------------------------------------------------- RESULTS -------------------------------------------------    LABS:  Results for orders placed or performed during the hospital encounter of 11/04/22   Lactate, Sepsis   Result Value Ref Range    Lactic Acid, Sepsis 1.0 0.5 - 1.9 mmol/L   C-Reactive Protein   Result Value Ref Range    CRP 4.8 (H) 0.0 - 0.4 mg/dL   CBC with Auto Differential   Result Value Ref Range    WBC 11.3 4.5 - 11.5 E9/L    RBC 4.63 3.80 - 5.80 E12/L    Hemoglobin 14.6 12.5 - 16.5 g/dL    Hematocrit 41.4 37.0 - 54.0 %    MCV 89.4 80.0 - 99.9 fL    MCH 31.5 26.0 - 35.0 pg    MCHC 35.3 (H) 32.0 - 34.5 %    RDW 13.3 11.5 - 15.0 fL    Platelets 886 829 - 021 E9/L    MPV 10.6 7.0 - 12.0 fL    Neutrophils % 75.5 43.0 - 80.0 %    Immature Granulocytes % 0.2 0.0 - 5.0 %    Lymphocytes % 13.9 (L) 20.0 - 42.0 %    Monocytes % 7.8 2.0 - 12.0 %    Eosinophils % 2.2 0.0 - 6.0 %    Basophils % 0.4 0.0 - 2.0 %    Neutrophils Absolute 8.50 (H) 1.80 - 7.30 E9/L    Immature Granulocytes # 0.02 E9/L    Lymphocytes Absolute 1.57 1.50 - 4.00 E9/L Monocytes Absolute 0.88 0.10 - 0.95 E9/L    Eosinophils Absolute 0.25 0.05 - 0.50 E9/L    Basophils Absolute 0.04 0.00 - 0.20 M5/I   Basic Metabolic Panel   Result Value Ref Range    Sodium 140 132 - 146 mmol/L    Potassium 4.3 3.5 - 5.0 mmol/L    Chloride 106 98 - 107 mmol/L    CO2 23 22 - 29 mmol/L    Anion Gap 11 7 - 16 mmol/L    Glucose 98 74 - 99 mg/dL    BUN 13 6 - 20 mg/dL    Creatinine 0.8 0.7 - 1.2 mg/dL    Est, Glom Filt Rate >60 >=60 mL/min/1.73    Calcium 9.8 8.6 - 10.2 mg/dL       RADIOLOGY:  XR HAND LEFT (MIN 3 VIEWS)    (Results Pending)           ------------------------- NURSING NOTES AND VITALS REVIEWED ---------------------------  Date / Time Roomed:  11/4/2022 10:21 AM  ED Bed Assignment:  17B/17B-17    The nursing notes within the ED encounter and vital signs as below have been reviewed. Patient Vitals for the past 24 hrs:   BP Temp Pulse Resp SpO2 Height Weight   11/04/22 1033 (!) 141/86 -- -- -- -- 6' 2\" (1.88 m) 209 lb (94.8 kg)   11/04/22 1018 -- 98.6 °F (37 °C) 65 18 99 % -- --       Oxygen Saturation Interpretation: Normal    ------------------------------------------ PROGRESS NOTES ------------------------------------------  Re-evaluation(s):   Patients symptoms show no change  Repeat physical examination is not changed    Counseling:  I have spoken with the patient and discussed todays results, in addition to providing specific details for the plan of care and counseling regarding the diagnosis and prognosis. Their questions are answered at this time and they are agreeable with the plan of admission.    --------------------------------- ADDITIONAL PROVIDER NOTES ---------------------------------  Consultations:  Spoke with Dr. Dexter Montiel. Discussed case. They will admit the patient.   This patient's ED course included: a personal history and physicial examination, re-evaluation prior to disposition, multiple bedside re-evaluations, IV medications, cardiac monitoring, and continuous pulse oximetry    This patient has remained hemodynamically stable during their ED course. Diagnosis:  1.  Cellulitis of finger of left hand        Disposition:  Patient's disposition: Admit to med/surg floor  Patient's condition is Stable           Pia Fuller MD  Resident  11/04/22 MD Candie  Resident  11/04/22 [id number]

## 2022-11-04 NOTE — PROGRESS NOTES
09:45 called  Nurse to Nurse   report to Vaughn Pruitt RN in ER   To  report was given earlier per Dr. Jerald Bear    10:00 Dr Jerald Bear ambulated patient to ER , transport still has not arrived

## 2022-11-04 NOTE — H&P
[facility]  Internal Medicine Residency Program  History and Physical    Patient:  Neeru Sheppard 59 y.o. male MRN: [id number]     Date of Service: 11/4/2022    Hospital Day: 1      Chief complaint: had concerns including Hand Pain (Pt sent from the clinic for cellulitis- left hand). History of Present Illness   The patient is a 59 y.o. male without significant PMH presenting for left hand pain, redness and swelling for 3 days. He was scratched by his fully vaccinated (including rabies) house cat on 10/26 on his left 4th digit, and was also bitten on his right hand. 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 He was originally asymptomatic until 11/2 when he started noticing edema and erythema that has progressively gotten worse. It has spread to encompass his 3rd-5th digits and distal hand. It has become difficult for him to move his fingers and he cannot make a fist. He has taken ibuprofen with only minor alleviation in symptoms. He denies fever, chills, diaphoresis, weight or appetite change. He denies tobacco or illicit substance use, and drinks alcohol very rarely, never more than 1 drink. ED Course: Patient presented to the ED vitally stable directly from urgent care d/t concern for infection. He received antibiotics and diptheria-tetanus toxoid. Ortho was consulted and have evaluated him, no plans for OR. BMP and CBC shows no irregularities. CRP elevated 4.8, lactic acid normal at 1.0. He was admitted for IV antibiotics and further workup and treatment. ED Meds: Patient was given Ceftriaxone, vancomycin, metronidazole, diptheria-tetanus toxoid. ED Fluids: Patient was not given fluids in the ED. Past Medical History:  History reviewed. No pertinent past medical history.     Past Surgical History:        Procedure Laterality Date    ABDOMEN SURGERY      1DAYS OLD    ABDOMINAL ADHESION SURGERY APPENDECTOMY  1970    CHOLECYSTECTOMY, LAPAROSCOPIC N/A 1/25/2019    LAPAROSCOPIC CHOLECYSTECTOMY performed by Abraham Macdonald MD at [place] [address] N/A 9/24/2021    COLONOSCOPY WITH BIOPSY performed by Elza Mcnamara MD at [address] [place] [address]       Medications Prior to Admission:    Prior to Admission medications    Medication Sig Start Date End Date Taking? Authorizing Provider   diphenhydrAMINE (BENADRYL) 25 MG tablet Take 25 mg by mouth every 6 hours as needed for Itching    Historical Provider, MD   ibuprofen (ADVIL;MOTRIN) 200 MG tablet Take 200 mg by mouth every 6 hours as needed for Pain    Historical Provider, MD       Allergies:  Asa [aspirin], Pcn [penicillins], Rye grass flower pollen extract [gramineae pollens], and Albuterol sulfate    Social History:   TOBACCO:   reports that he has never smoked. He has never used smokeless tobacco.  ETOH:   reports current alcohol use. Reports no illicit substance use. OCCUPATION: [employer]. Patient does take care of variety of rescue animals and owns [employer]". Lives at home with fiance. Family History:       Problem Relation Age of Onset    Breast Cancer Mother     Cancer Father     Kidney Disease Father        REVIEW OF SYSTEMS:    Constitutional: No fever, no chills, no change in weight; good appetite  HEENT: No blurred vision, no ear problems, no sore throat, no rhinorrhea. Respiratory: No cough, no sputum production, no pleuritic chest pain, no shortness of breath  Cardiology: No angina, no dyspnea on exertion, no paroxysmal nocturnal dyspnea, no orthopnea, no palpitation, no leg swelling. Bliss Neer left sided chest pain with movement. Gastroenterology: No dysphagia, no reflux; no abdominal pain, no nausea or vomiting; no constipation or diarrhea.  No hematochezia   Genitourinary: No dysuria, no frequency, hesitancy; no hematuria  Musculoskeletal: no joint pain, no myalgia, no change in range of movement  Neurology: no focal weakness in extremities, no slurred speech, no double vision, no tingling or numbness sensation  Endocrinology: no temperature intolerance, no polyphagia, polydipsia or polyuria  Hematology: no increased bleeding, no bruising, no lymphadenopathy  Skin: no skin changes noticed by patient  Psychology: no depressed mood, no suicidal ideation    Physical Exam   Vitals: BP (!) 141/86   Pulse 65   Temp 98.6 °F (37 °C)   Resp 18   Ht 6' 2\" (1.88 m)   Wt 209 lb (94.8 kg)   SpO2 99%   BMI 26.83 kg/m²     General Appearance: alert and oriented to person, place and time, well developed and well- nourished, in no acute distress  Skin: warm and dry, well demarcated erythema L hand last 3-4 digits. Head: normocephalic and atraumatic  Eyes: pupils equal, round, and reactive to light, extraocular eye movements intact, conjunctivae normal  ENT: tympanic membrane, external ear and ear canal normal bilaterally, nose without deformity, nasal mucosa and turbinates normal without polyps  Neck: supple and non-tender without mass, no thyromegaly or thyroid nodules, no cervical lymphadenopathy  Pulmonary/Chest: clear to auscultation bilaterally- no wheezes, rales or rhonchi, normal air movement, no respiratory distress  Cardiovascular: normal rate, regular rhythm, normal S1 and S2, no murmurs, rubs, clicks, or gallops, distal pulses intact, no carotid bruits  Abdomen: soft, non-tender, non-distended, normal bowel sounds, no masses or organomegaly  Extremities: Well demarcated, warm, erythema/edema L hand last 3 digits. Healing linear scratch wound without suppuration on L hand 4th digit. No cyanosis or clubbing. No epitrochlear or axial LAD. No tenderness extending beyond wrist.  Musculoskeletal: Tenderness, limited ROM to L hand digits 3-5.     Labs and Imaging Studies   Basic Labs  Recent Labs     11/04/22  1034      K 4.3      CO2 23   BUN 13   CREATININE 0.8   GLUCOSE 98   CALCIUM 9.8 Recent Labs     11/04/22  1034   WBC 11.3   RBC 4.63   HGB 14.6   HCT 41.4   MCV 89.4   MCH 31.5   MCHC 35.3*   RDW 13.3      MPV 10.6       CBC:   Lab Results   Component Value Date/Time    WBC 11.3 11/04/2022 10:34 AM    RBC 4.63 11/04/2022 10:34 AM    HGB 14.6 11/04/2022 10:34 AM    HCT 41.4 11/04/2022 10:34 AM    MCV 89.4 11/04/2022 10:34 AM    RDW 13.3 11/04/2022 10:34 AM     11/04/2022 10:34 AM     BMP:    Lab Results   Component Value Date/Time     11/04/2022 10:34 AM    K 4.3 11/04/2022 10:34 AM     11/04/2022 10:34 AM    CO2 23 11/04/2022 10:34 AM    BUN 13 11/04/2022 10:34 AM       Imaging Studies:     No results found. EKG:   No results found. Resident's Assessment and Plan     Mickie Campos is a 59 y.o. male without significant PMH presenting with L hand tenderness and erythema after recent cat scratch. Septic tenosynovitis vs cellulitis 2/2 cat scratch   - By fully vaccinated house cat, no intervention for rabies indicated. - Nonsuppurative, well demarcated cellulitis with significantly decreased ROM on exam, no LAD. - Area of cellulitis marked with pen.   - Received Rocephin, Vanc, metronidazole, and Td in ED   - Blood Cxs x2 in process   - Will treat with Ceftriaxone, metronidazole, and TMP-SMX empirically while awaiting cultures. - Will get Bartonella henselae IgG and continue to monitor for symptoms. LAD takes an average of 2 weeks to develop. - Ortho on board, recommend IV ABX and no OR at this time.     PT/OT evaluation:   DVT prophylaxis/ GI prophylaxis:   Disposition: home    Vivi Mendieta, MS4  Attending physician: Dr. Sophia Guevara

## 2022-11-04 NOTE — PATIENT INSTRUCTIONS
Please visit ED as soon as possible for assessment of cat scratch. Call for a sooner appointment if you have additional concerns.     Linda Damon MD  Internal Medicine

## 2022-11-04 NOTE — PROGRESS NOTES
Osito Tena (:  1965) is a 59 y.o. male,Established patient, here for evaluation of the following chief complaint(s):  Abrasion (Scratch by a kitten x 1 week ago left hand /fingers are swollen , warm to touch no drainage from scratched areas with difficulty noted upon movement of hand and fingers)         ASSESSMENT/PLAN:  1. Suspected tenosynovitis likely secondary to  cat scratch  -We will refer patient to the emergency room to have been evaluated for further management    No follow-ups on file. Patient will be seen in the emergency department    Subjective   SUBJECTIVE/OBJECTIVE:  HPI    The patient is here for follow-up. He reports that last Wednesday patient was trying to protect his kitten from his dog and as a result of that the kitten scratched his left hand multiple times. At first he noted no swelling or redness, however within the last couple of days the hand had continued to swollen and turn red, was also warm, and restricted his ability to form a closed fist.  He denies any lymph node swelling. Denied any symptoms such as shortness of breath wheezing or swelling in other areas. The patient did not seek care at an ER or urgent care. He did not try any medications to relieve the swelling. Will refer the patient to the ER to have orthopedic evaluation as physical exam shows findings consistent with septic tenosynovitis. 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 It lasted for about 10 to 15 minutes before subsided on his own. He did not have any similar episode in the past and he has not had an episode since. The patient denied any sweating shortness of breath or palpitations during the episode. Last year the patient was admitted to the hospital for GI bleeding. Work-up was found to reveal hemorrhoids and a colonoscopy with biopsy revealed ischemic colitis.         Review of Systems   Constitutional:  Negative for chills, fatigue and fever. HENT:  Negative for congestion, facial swelling, postnasal drip and rhinorrhea. Eyes:  Negative for redness, itching and visual disturbance. Respiratory:  Negative for cough, choking and shortness of breath. Cardiovascular:  Negative for chest pain, palpitations and leg swelling. Genitourinary:  Negative for difficulty urinating, flank pain, frequency, hematuria and urgency. Musculoskeletal:  Negative for arthralgias, back pain and myalgias. Skin:  Positive for rash and wound. Neurological:  Negative for dizziness, weakness, light-headedness, numbness and headaches. Objective   Physical Exam  Vitals reviewed. Constitutional:       Appearance: Normal appearance. HENT:      Head: Normocephalic and atraumatic. Cardiovascular:      Rate and Rhythm: Normal rate and regular rhythm. Pulses: Normal pulses. Heart sounds: Normal heart sounds. Pulmonary:      Effort: Pulmonary effort is normal.      Breath sounds: Normal breath sounds. Musculoskeletal:      Comments: Restricted mobility of left hand. Unable to make a fist.  Patient endorses dorsal wrist pain with fourth and fifth digit movement. Range of motion of other joints fully intact. Skin:     Comments: Erythematous, swollen, and warm left hand. Neurological:      General: No focal deficit present. Mental Status: He is alert and oriented to person, place, and time. Psychiatric:         Mood and Affect: Mood normal.                    An electronic signature was used to authenticate this note.     --Sky Dobbs MD

## 2022-11-04 NOTE — ED NOTES
Pt was scratched by a cat last Wednesday, 10/26, and then started having left hand pain and swelling.        Sameer Raygoza RN  11/04/22 8623

## 2022-11-04 NOTE — PROGRESS NOTES
[facility]  Internal Medicine [facility]    Attending Physician Statement:  Burt Michel M.D., F.A.C.P. I have discussed the case, including pertinent history and exam findings with the resident. I have seen and examined the patient and the key elements of the encounter have been performed by me. I agree with the assessment, plan and orders as documented by the resident. Patient is seen for urgent care  visit today. Last office ER/notes reviewed, relative labs and imaging. Noted   Cat scratch  Last Wednesday  Now worsening dorsum and finger swelling/erythema  -- rule out septic tenosynovitis  Moving tip of 4 and 5 th dip reproduce dorsum wrist pain- gliding tendons    I called ER doc - plan to send in to ER  Evaluate by ortho (prior to noon, if he gets there now)  Augmentin vs unasyn plan-- ?admission    Hx of coliis \"told ischemic in kid in past\" - some diarrhea-   Bowel surgery as kid-- resected - now at risk for adhesions  He is sp bowel adhesion lysis in past  11/2021-- bloody stools   Bx showed:  \"Sigmoid colon, biopsy: Ischemic colitis\"  No more bloody stools currently    Seen by us last on 9/28/21  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 GERD  Occurred x1 only   Echo in past year OK -- 55% stage 1 diastoioc  Defer cardiac workkup for now, observation  Ascvd risk 7.5% - ?statin ? ASA- he deferred for now    He also is complaining of b/l elbow pain  -- on exam- consistent with b/l lateral epicondylitis- avised counter foarce strap (which he isn't using regularly)-- also avoidance of archery practice for awhile      IV abx, ortho consult    -- noted last Tdap from 2/2019  - good  High risk/medical complexity  For now sending to ER  I escorted him to ER personally   and showed and discussed with dr. Darius Hernandez ER doc  Also called house team 2 karlenelo to discuss if admission  50min  Remainder of medical problems as per resident note.

## 2022-11-04 NOTE — ED NOTES
Patient presented to the ED with swelling to left hand stated that it was from a cat scratch.  Patient is alert and oriented x4, able to make needs known, patient connected to the monitor call light within reach      [facility] RN  11/04/22 [id number]

## 2022-11-04 NOTE — ED PROVIDER NOTES
ATTENDING PROVIDER ATTESTATION:     Yahaira Porras presented to the emergency department for evaluation of Hand Pain (Pt sent from the clinic for cellulitis- left hand)   and was initially evaluated by the Medical Resident. See Original ED Note for H&P and ED course above. I have reviewed and discussed the case, including pertinent history (medical, surgical, family and social) and exam findings with the Medical Resident assigned to Yahaira Porras. I have personally performed and/or participated in the history, exam, medical decision making, and procedures and agree with all pertinent clinical information and any additional changes or corrections are noted below in my assessment and plan. I have discussed this patient in detail with the resident, and provided the instruction and education,       I have reviewed my findings and recommendations with the assigned Medical Resident, Yahaira Porras and members of family present at the time of disposition. I have performed a history and physical examination of this patient and directly supervised the resident caring for this patient      History of Present Illness:    Presents to the ED for cat scratch, beginning left hand, that began several days ago. The complaint has been constant, severe in severity, and worsened by nothing. Patient reports that last Wednesday the cat scratch his hand. He reports redness and swelling and erythema. The cat is up-to-date on his vaccinations. Patient was sent for the medicine clinic with concerns for tenosynovitis. Denies fever or chills. Review of Systems:   A complete review of systems was performed and pertinent positives and negatives are stated within HPI, all other systems reviewed and are negative.    --------------------------------------------- PAST HISTORY ---------------------------------------------  Past Medical History:  has no past medical history on file.     Past Surgical History:  has a past surgical history that includes Abdomen surgery; Appendectomy (1970); Abdominal adhesion surgery; Cholecystectomy, laparoscopic (N/A, 1/25/2019); and Colonoscopy (N/A, 9/24/2021). Social History:  reports that he has never smoked. He has never used smokeless tobacco. He reports current alcohol use. He reports that he does not use drugs. Family History: family history includes Breast Cancer in his mother; Cancer in his father; Kidney Disease in his father. Unless otherwise noted, family history is non contributory    The patients home medications have been reviewed. Allergies: Asa [aspirin], Pcn [penicillins], Rye grass flower pollen extract [gramineae pollens], and Albuterol sulfate      Physical Exam:  Constitutional/General: Alert and oriented x3  Head: Normocephalic and atraumatic  Eyes: PERRL, EOMI, sclera non icteric  ENT: Oropharynx clear, handling secretions  Neck: Supple, full ROM, no stridor, no meningeal signs  Respiratory: Lungs clear to auscultation bilaterally, no wheezes, rales, or rhonchi. Not in respiratory distress  Cardiovascular:  Regular rate. Regular rhythm. No murmurs, no gallops, no rubs. 2+ distal pulses. Equal extremity pulses. Musculoskeletal: Moves all extremities x 4. Warm and well perfused,  no clubbing, no cyanosis, no edema. Palpable peripheral pulses  Integument: skin warm and dry. Left hand has erythema along multiple fingers and volar and dorsal aspects of the fingers. The fingers are held slightly in flexion along the second and third fingers as well as ring finger and tenderness along the flexor tendon sheath as well as dorsal fingers. There is no crepitus. No necrosis. There is healing scabs on the dorsal aspect of the fourth and third fingers. No pus. No drainage. No obvious abscess.   Neurologic: GCS 15, no focal deficits  Psychiatric: Normal Affect      I directly supervised any procedures performed by the resident and was present for the procedure including all critical portions of the procedure            I, Dr. Kelli Narayanan, am the primary provider of record      Medical Decision Making: There is concern the patient may have tenosynovitis, Ortho was consulted, medicine was consulted for admission. IV antimicrobials were given. Name and Route of medications administered in the ED:  Medications   sodium chloride flush 0.9 % injection 10 mL (has no administration in time range)   sodium chloride flush 0.9 % injection 10 mL (has no administration in time range)   0.9 % sodium chloride infusion (has no administration in time range)   enoxaparin (LOVENOX) injection 40 mg (40 mg SubCUTAneous Not Given 11/4/22 1800)   ondansetron (ZOFRAN-ODT) disintegrating tablet 4 mg (has no administration in time range)     Or   ondansetron (ZOFRAN) injection 4 mg (has no administration in time range)   polyethylene glycol (GLYCOLAX) packet 17 g (has no administration in time range)   acetaminophen (TYLENOL) tablet 650 mg (has no administration in time range)     Or   acetaminophen (TYLENOL) suppository 650 mg (has no administration in time range)   cefTRIAXone (ROCEPHIN) 2,000 mg in sterile water 20 mL IV syringe (has no administration in time range)   metronidazole (FLAGYL) 500 mg in 0.9% NaCl 100 mL IVPB premix (500 mg IntraVENous New Bag 11/4/22 1843)   diptheria-tetanus toxoids (DECAVAC) 2-2 LF/0.5ML injection 0.5 mL (0.5 mLs IntraMUSCular Given 11/4/22 1138)   metronidazole (FLAGYL) 500 mg in 0.9% NaCl 100 mL IVPB premix (0 mg IntraVENous Stopped 11/4/22 1308)   vancomycin (VANCOCIN) 2,000 mg in dextrose 5 % 500 mL IVPB (0 mg IntraVENous Stopped 11/4/22 1548)   cefTRIAXone (ROCEPHIN) 2,000 mg in sterile water 20 mL IV syringe (2,000 mg IntraVENous Given 11/4/22 1142)   fentaNYL (SUBLIMAZE) injection 50 mcg (50 mcg IntraVENous Given 11/4/22 1140)       Consultations:  Internal medicine  Ortho        1.  Cellulitis of finger of left hand            Jackie Garay MD  11/04/22 2003

## 2022-11-04 NOTE — H&P
[facility]  Internal Medicine Residency Program  History and Physical    Patient:  Abdi Fajardo 59 y.o. male MRN: [id number]     Date of Service: 11/4/2022    Hospital Day: 1      Chief complaint: had concerns including Hand Pain (Pt sent from the clinic for cellulitis- left hand). History Obtained From:  patient    Date of Admission: 11/4/22  History of Present Illness   Abdi Fajardo is a 59 y.o. male with past medical history of sigmoid colitis (9/2021) who presented to the ED with chief complaint of left hand pain. He was sent from the internal medicine clinic for concerns of tenosynovitis of his left hand. Per the patient, he was scratched by his cat on his left 4th digit on 10/26 while removing the cat from the mouth of his dong. 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 He denies any fever, chills, or enlarged lymph nodes. The patient reports he had no symptoms until two days ago when he began to notice redness and warmth on his left hand. He states it has gotten progressively worse and he now has pain when trying to bend his fingers which is why he decided to go to clinic to have his hand looked at. At the internal medicine clinic, the patient was examined and found to have erythema, warmth, and restricted mobility of his left hand. It was then decided the patient needed to go to the ED for concerns of tenosynovitis. In the ED x-ray of the left hand was negative. Broad spectrum antibiotics were started including rocephin, flagyl, and vancomycin. Patient was also given a tetanus vaccine. Ortho was consulted who believe this is more likely cellulitis than tenosynovitis. They recommended IV antibiotics and a Caleb pillow to be placed. The patient was admitted for further management.          Previous Hospital Admission:no recent hospital admissions       Past Medical History:   History reviewed. No pertinent past medical history. Past Surgical History:        Procedure Laterality Date    ABDOMEN SURGERY      1DAYS OLD    ABDOMINAL ADHESION SURGERY      APPENDECTOMY  1970    CHOLECYSTECTOMY, LAPAROSCOPIC N/A 1/25/2019    LAPAROSCOPIC CHOLECYSTECTOMY performed by Raissa Pacheco MD at [address] [facility] N/A 9/24/2021    COLONOSCOPY WITH BIOPSY performed by Tiffanie Ayala MD at [facility]       Medications Prior to Admission:    Prior to Admission medications    Medication Sig Start Date End Date Taking? Authorizing Provider   diphenhydrAMINE (BENADRYL) 25 MG tablet Take 25 mg by mouth every 6 hours as needed for Allergies    Historical Provider, MD   ibuprofen (ADVIL;MOTRIN) 200 MG tablet Take 400 mg by mouth every 6 hours as needed for Pain    Historical Provider, MD       Allergies:  Asa [aspirin], Pcn [penicillins], Rye grass flower pollen extract [gramineae pollens], and Albuterol sulfate    Social History:   TOBACCO:   reports that he has never smoked. He has never used smokeless tobacco.  ETOH:   reports current alcohol use. OCCUPATION: [employer]     Family History:       Problem Relation Age of Onset    Breast Cancer Mother     Cancer Father     Kidney Disease Father        REVIEW OF SYSTEMS:    Review of Systems   Constitutional:  Negative for fatigue and fever. HENT:  Negative for rhinorrhea and sore throat. Respiratory:  Negative for cough, shortness of breath, wheezing and stridor. Cardiovascular:  Negative for chest pain, palpitations and leg swelling. Gastrointestinal:  Negative for abdominal pain, constipation, diarrhea, nausea and vomiting. Skin:  Positive for wound (abrasion left 4th digit). Neurological:  Negative for tremors, syncope, weakness and headaches.       Physical Exam   Vitals: BP (!) 141/86   Pulse 65   Temp 98.6 °F (37 °C)   Resp 18   Ht 6' 2\" (1.88 m)   Wt 209 lb (94.8 kg)   SpO2 99%   BMI 26.83 kg/m² Physical Exam  Constitutional:       Appearance: Normal appearance. HENT:      Head: Normocephalic and atraumatic. Eyes:      Extraocular Movements: Extraocular movements intact. Pupils: Pupils are equal, round, and reactive to light. Cardiovascular:      Rate and Rhythm: Normal rate. Pulses: Normal pulses. Heart sounds: Normal heart sounds. Pulmonary:      Effort: Pulmonary effort is normal.      Breath sounds: Normal breath sounds. Abdominal:      General: Abdomen is flat. Palpations: Abdomen is soft. Musculoskeletal:      Cervical back: Normal range of motion and neck supple. Comments: Pain with flexion of left hand; erythema and warmth of 4th and 5th digit on L hand extending to the palm   Neurological:      General: No focal deficit present. Mental Status: He is alert and oriented to person, place, and time. Psychiatric:         Mood and Affect: Mood normal.         Behavior: Behavior normal.         Thought Content:  Thought content normal.         Judgment: Judgment normal.      Labs and Imaging Studies   Basic Labs  Recent Labs     11/04/22  1034      K 4.3      CO2 23   BUN 13   CREATININE 0.8   GLUCOSE 98   CALCIUM 9.8       Recent Labs     11/04/22  1034   WBC 11.3   RBC 4.63   HGB 14.6   HCT 41.4   MCV 89.4   MCH 31.5   MCHC 35.3*   RDW 13.3      MPV 10.6       CBC:   Lab Results   Component Value Date/Time    WBC 11.3 11/04/2022 10:34 AM    RBC 4.63 11/04/2022 10:34 AM    HGB 14.6 11/04/2022 10:34 AM    HCT 41.4 11/04/2022 10:34 AM    MCV 89.4 11/04/2022 10:34 AM    RDW 13.3 11/04/2022 10:34 AM     11/04/2022 10:34 AM     CMP:  Lab Results   Component Value Date/Time     11/04/2022 10:34 AM    K 4.3 11/04/2022 10:34 AM     11/04/2022 10:34 AM    CO2 23 11/04/2022 10:34 AM    BUN 13 11/04/2022 10:34 AM    PROT 7.7 11/30/2021 01:23 PM       Imaging Studies:     XR HAND LEFT (MIN 3 VIEWS)    Result Date: 11/4/2022  EXAMINATION: THREE XRAY VIEWS OF THE LEFT HAND 11/4/2022 12:00 pm COMPARISON: None. HISTORY: ORDERING SYSTEM PROVIDED HISTORY: cat bite TECHNOLOGIST PROVIDED HISTORY: Reason for exam:->cat bite What reading provider will be dictating this exam?->CRC FINDINGS: There is no evidence of acute fracture. There is normal alignment. No acute joint abnormality. No focal osseous lesion. No focal soft tissue abnormality. No acute osseous abnormality. Questionable subtle the peak foreign body in the index finger on the radial side. Resident's Assessment and Plan       Reba Kulkarni is a 59 y.o. male with  has no past medical history on file. He presented to the ED from the internal medicine clinic with complaints of left hand pain, swelling, and redness after being scratched by his cat. Came with CC: hand pain      Assessment & Plan  Cellulitis vs tenosynovitis Left hand 2/2 cat scratch  - patient scratched by his cat on 10/26  - symptoms including erythema, warmth, edema, decreased range of motion began a few days ago  PLAN  - ortho consulted; recommend IV antibiotics; no surgical intervention at this time  - continue rocephin and flagyl   - follow ASO, CK, pro-mariah  - follow Bartonella IgG, PCR  - follow MRSA culture  - charlotte area of erythema, monitor for expansion    2.  History sigmoid colitis       PT/OT: not indicated at this time  DVT ppx: lovenox  GI ppx: diet    Next of Kin/ POA:   [facility] ([phone])    Code Status:   Full code     Nereyda Nava MD, PGY-1  Attending physician: Dr. Cali Izquierdo

## 2022-11-04 NOTE — PROGRESS NOTES
Department of Orthopedic Surgery  Resident Consult Note          Reason for Consult:  Left hand pain     HISTORY OF PRESENT ILLNESS:       Patient is a 59 y.o. male who presents with left hand pain after cat scratch 1 week ago. Patient did not seek medical intervention thinking it was a just a sore finger however it has progressed to pain and swelling. Pain is localized to right hand on the volar aspect, nonradiating. Pain 6/10. Patient is right hand dominant. Anticoagulation - none. The patient occupation - . Pt lives at home. Patient denies significant past medical history. Previous Orthopedic Surgeon - no  Denies numbness/tingling/paresthesias. Denies any other orthopedic complaints at this time. Tobacco use: none  Alcohol use: none  Illicit drug use: no history of illicit drug use    Past Medical History:    History reviewed. No pertinent past medical history. Past Surgical History:        Procedure Laterality Date    ABDOMEN SURGERY      1DAYS OLD    ABDOMINAL ADHESION SURGERY      APPENDECTOMY  1970    CHOLECYSTECTOMY, LAPAROSCOPIC N/A 1/25/2019    LAPAROSCOPIC CHOLECYSTECTOMY performed by Radha Cook MD at [address] [place] [address] N/A 9/24/2021    COLONOSCOPY WITH BIOPSY performed by Eduardo Villagran MD at [facility] ENDOSCOPY     Current Medications:   Current Facility-Administered Medications: sodium chloride flush 0.9 % injection 5-40 mL, 5-40 mL, IntraVENous, 2 times per day  sodium chloride flush 0.9 % injection 5-40 mL, 5-40 mL, IntraVENous, PRN  0.9 % sodium chloride infusion, , IntraVENous, PRN  metronidazole (FLAGYL) 500 mg in 0.9% NaCl 100 mL IVPB premix, 500 mg, IntraVENous, Once  vancomycin (VANCOCIN) 2,000 mg in dextrose 5 % 500 mL IVPB, 20 mg/kg, IntraVENous, Once  Allergies:  Asa [aspirin], Pcn [penicillins], Rye grass flower pollen extract [gramineae pollens], and Albuterol sulfate    Social History:   TOBACCO:   reports that he has never smoked.  He has never used smokeless tobacco.  ETOH:   reports current alcohol use. DRUGS:   reports no history of drug use.   ACTIVITIES OF DAILY LIVING:    OCCUPATION:    Family History:       Problem Relation Age of Onset    Breast Cancer Mother     Cancer Father     Kidney Disease Father        REVIEW OF SYSTEMS:  CONSTITUTIONAL:  negative for  fevers, chills  EYES:  negative for blurred vision, visual disturbance  HEENT:  negative for  hearing loss, voice change  RESPIRATORY:  negative for  dyspnea, wheezing  CARDIOVASCULAR:  negative for  chest pain, palpitations  GASTROINTESTINAL:  negative for nausea, vomiting  GENITOURINARY:  negative for frequency, urinary incontinence  HEMATOLOGIC/LYMPHATIC:  negative for bleeding and petechiae  MUSCULOSKELETAL:  positive for  pain and joint swelling  NEUROLOGICAL:  negative for headaches, dizziness  BEHAVIOR/PSYCH:  negative for increased agitation and anxiety    PHYSICAL EXAM:    VITALS:  BP (!) 141/86   Pulse 65   Temp 98.6 °F (37 °C)   Resp 18   Ht 6' 2\" (1.88 m)   Wt 209 lb (94.8 kg)   SpO2 99%   BMI 26.83 kg/m²   CONSTITUTIONAL:  awake, alert, cooperative, no apparent distress, and appears stated age  General appearance: alert, well appearing, and in no distress,  normal appearing weight  Mental status: alert, oriented to person, place, and time, normal mood, behavior, speech, dress, motor activity, and thought processes  Abdomen: soft, nondistended   Resp:   resp easy and unlabored, no audible wheezes note  Cardiac: distal pulses palpable, skin well perfused  Neurological: alert, oriented X3, normal speech, no focal findings or movement disorder noted, motor and sensory grossly normal bilaterally, normal muscle tone, no tremors, strength 5/5, normal gait and station  HEENT: normochephalic atraumatic, external ears and eyes normal, sclera normal, neck supple  Extremities:   peripheral pulses normal, no edema, redness or tenderness in the calves   Skin: normal coloration, no rashes or open wounds, no suspicious skin lesions noted  Psych: Affect euthymic   MUSCULOSKELETAL:  Left upper Extremity:  Scratch wound that are well healed at the radial aspect of middle finger. Mild erythema noted on index and middle finger. Mild to moderate swelling present without fluctuance. No proximal erythema. Warmth to palpation. Skin intact circumferentially  +TTP middle finger and palm. -TTP at forearm Compartments soft and compressible  Able to perform composite fist without pain. -ve pain to passive stretch. +AIN/PIN/Ulnar nerve function intact grossly  +Radial pulse, Brisk Cap refill, hand warm and perfused  Sensation intact to touch in radial/ulnar/median nerve distributions to hand    Secondary Exam:   rightUE: No obvious signs of trauma. -TTP to fingers, hand, wrist, forearm, elbow, humerus, shoulder or clavicle. -- Patient able to flex/extend fingers, wrist, elbow and shoulder with active and passive ROM without pain, +2/4 Radial pulse, cap refill <3sec, +AIN/PIN/Radial/Ulnar/Median N, distal sensation grossly intact to C4-T1 dermatomes, compartments soft and compressible. bilateralLE: No obvious signs of trauma. -TTP to foot, ankle, leg, knee, thigh, hip.-- Patient able to flex/extend toes, ankle, knee and hip with active and passive ROM without pain,+2/4 DP & PT pulses, cap refill <3sec, +5/5 PF/DF/EHL, distal sensation grossly intact to L4-S1 dermatomes, compartments soft and compressible.     Pelvis: -TTP, -Log roll, -Heel strike     DATA:    CBC:   Lab Results   Component Value Date/Time    WBC 11.3 11/04/2022 10:34 AM    RBC 4.63 11/04/2022 10:34 AM    HGB 14.6 11/04/2022 10:34 AM    HCT 41.4 11/04/2022 10:34 AM    MCV 89.4 11/04/2022 10:34 AM    MCH 31.5 11/04/2022 10:34 AM    MCHC 35.3 11/04/2022 10:34 AM    RDW 13.3 11/04/2022 10:34 AM     11/04/2022 10:34 AM    MPV 10.6 11/04/2022 10:34 AM     PT/INR:  No results found for: PROTIME, INR  CRP/ESR:   Lab Results   Component Value Date/Time CRP 4.8 11/04/2022 10:34 AM     Lactic Acid :   Lab Results   Component Value Date/Time    LACTA 0.9 09/21/2021 09:25 PM       Radiology Review:  11/04/22 - XR pending    IMPRESSION:   Left hand cellulitis    PLAN:  NWB - LUE  Caleb pillow ordered and placed on left upper extremity. Ice applied. Low suspicion for flexor tenosynovitis. Recommend medical admission for overnight IV antibiotics. Hand XR pending  Pain Control admitting service  PT/OT   Continue ice and elevation to decrease swelling  No acute orthopedic intervention is required.  Will monitor patient during the course of his hospital stay for clinical progression while on IV ABX  Discussed with Dr. Yehuda Reddy

## 2022-11-05 LAB
ALBUMIN SERPL-MCNC: 4 G/DL (ref 3.5–5.2)
ALP BLD-CCNC: 105 U/L (ref 40–129)
ALT SERPL-CCNC: 39 U/L (ref 0–40)
ANION GAP SERPL CALCULATED.3IONS-SCNC: 12 MMOL/L (ref 7–16)
ANTISTREPTOLYSIN-O: 22 IU/ML (ref 0–200)
AST SERPL-CCNC: 33 U/L (ref 0–39)
BASOPHILS ABSOLUTE: 0.03 E9/L (ref 0–0.2)
BASOPHILS RELATIVE PERCENT: 0.3 % (ref 0–2)
BILIRUB SERPL-MCNC: 0.6 MG/DL (ref 0–1.2)
BUN BLDV-MCNC: 13 MG/DL (ref 6–20)
CALCIUM SERPL-MCNC: 9.4 MG/DL (ref 8.6–10.2)
CHLORIDE BLD-SCNC: 103 MMOL/L (ref 98–107)
CO2: 23 MMOL/L (ref 22–29)
CREAT SERPL-MCNC: 0.9 MG/DL (ref 0.7–1.2)
EOSINOPHILS ABSOLUTE: 0.34 E9/L (ref 0.05–0.5)
EOSINOPHILS RELATIVE PERCENT: 3.5 % (ref 0–6)
GFR SERPL CREATININE-BSD FRML MDRD: >60 ML/MIN/1.73
GLUCOSE BLD-MCNC: 83 MG/DL (ref 74–99)
HCT VFR BLD CALC: 41.7 % (ref 37–54)
HEMOGLOBIN: 13.6 G/DL (ref 12.5–16.5)
IMMATURE GRANULOCYTES #: 0.04 E9/L
IMMATURE GRANULOCYTES %: 0.4 % (ref 0–5)
LYMPHOCYTES ABSOLUTE: 1.68 E9/L (ref 1.5–4)
LYMPHOCYTES RELATIVE PERCENT: 17.2 % (ref 20–42)
MCH RBC QN AUTO: 29.4 PG (ref 26–35)
MCHC RBC AUTO-ENTMCNC: 32.6 % (ref 32–34.5)
MCV RBC AUTO: 90.3 FL (ref 80–99.9)
MONOCYTES ABSOLUTE: 0.91 E9/L (ref 0.1–0.95)
MONOCYTES RELATIVE PERCENT: 9.3 % (ref 2–12)
NEUTROPHILS ABSOLUTE: 6.75 E9/L (ref 1.8–7.3)
NEUTROPHILS RELATIVE PERCENT: 69.3 % (ref 43–80)
PDW BLD-RTO: 13.2 FL (ref 11.5–15)
PLATELET # BLD: 231 E9/L (ref 130–450)
PMV BLD AUTO: 11.4 FL (ref 7–12)
POTASSIUM REFLEX MAGNESIUM: 4.1 MMOL/L (ref 3.5–5)
PROCALCITONIN: 0.1 NG/ML (ref 0–0.08)
RBC # BLD: 4.62 E12/L (ref 3.8–5.8)
SODIUM BLD-SCNC: 138 MMOL/L (ref 132–146)
TOTAL CK: 170 U/L (ref 20–200)
TOTAL PROTEIN: 7.3 G/DL (ref 6.4–8.3)
WBC # BLD: 9.8 E9/L (ref 4.5–11.5)

## 2022-11-05 PROCEDURE — 84145 PROCALCITONIN (PCT): CPT

## 2022-11-05 PROCEDURE — 96366 THER/PROPH/DIAG IV INF ADDON: CPT

## 2022-11-05 PROCEDURE — 99231 SBSQ HOSP IP/OBS SF/LOW 25: CPT | Performed by: INTERNAL MEDICINE

## 2022-11-05 PROCEDURE — 87471 BARTONELLA DNA AMP PROBE: CPT

## 2022-11-05 PROCEDURE — G0378 HOSPITAL OBSERVATION PER HR: HCPCS

## 2022-11-05 PROCEDURE — 80053 COMPREHEN METABOLIC PANEL: CPT

## 2022-11-05 PROCEDURE — 96376 TX/PRO/DX INJ SAME DRUG ADON: CPT

## 2022-11-05 PROCEDURE — 36415 COLL VENOUS BLD VENIPUNCTURE: CPT

## 2022-11-05 PROCEDURE — 86611 BARTONELLA ANTIBODY: CPT

## 2022-11-05 PROCEDURE — 85025 COMPLETE CBC W/AUTO DIFF WBC: CPT

## 2022-11-05 PROCEDURE — 1200000000 HC SEMI PRIVATE

## 2022-11-05 PROCEDURE — 86060 ANTISTREPTOLYSIN O TITER: CPT

## 2022-11-05 PROCEDURE — 96372 THER/PROPH/DIAG INJ SC/IM: CPT

## 2022-11-05 PROCEDURE — 82550 ASSAY OF CK (CPK): CPT

## 2022-11-05 PROCEDURE — 2500000003 HC RX 250 WO HCPCS: Performed by: INTERNAL MEDICINE

## 2022-11-05 PROCEDURE — 6360000002 HC RX W HCPCS: Performed by: INTERNAL MEDICINE

## 2022-11-05 PROCEDURE — 2580000003 HC RX 258: Performed by: INTERNAL MEDICINE

## 2022-11-05 RX ADMIN — Medication 10 ML: at 08:29

## 2022-11-05 RX ADMIN — METRONIDAZOLE 500 MG: 500 INJECTION, SOLUTION INTRAVENOUS at 11:23

## 2022-11-05 RX ADMIN — METRONIDAZOLE 500 MG: 500 INJECTION, SOLUTION INTRAVENOUS at 03:31

## 2022-11-05 RX ADMIN — WATER 2000 MG: 1 INJECTION INTRAMUSCULAR; INTRAVENOUS; SUBCUTANEOUS at 11:17

## 2022-11-05 RX ADMIN — METRONIDAZOLE 500 MG: 500 INJECTION, SOLUTION INTRAVENOUS at 18:48

## 2022-11-05 RX ADMIN — ENOXAPARIN SODIUM 40 MG: 100 INJECTION SUBCUTANEOUS at 08:29

## 2022-11-05 RX ADMIN — Medication 10 ML: at 22:21

## 2022-11-05 NOTE — PROGRESS NOTES
[facility]  Internal Medicine Residency / [address]    Attending Physician Statement  I have discussed the case, including pertinent history and exam findings with the resident and the team.  I have seen and examined the patient and the key elements of the encounter have been performed by me. I agree with the assessment, plan and orders as documented by the resident. Case Discussed During AM Rounds   Covering for Dr. Lg Link and House Team 2 this weekend   Patient admitted on 11/4 from the IM clinic with concerns of escalating Left hand cellulitis vs. Tenosynovitis    Orthopedics following at this time    Empiric IV atbs initiated on 11/4    Recent cat bite noted on history prior to presentation    Pain and swelling are improved this am, ROM improving of hand   Demarcation of swelling shows reduction appropriately     Left Hand Cellulitis vs. Tenosynovitis   Orthopedics following    Per ortho- low suspicion for Tenosynovitis    Hand XR: reviewed- ? Foreign body- coordinate with Ortho    Pain control    Ice and elevation    Empiric atbs- IV for additional 24 hours with likely transition to PO tomorrow    Cultures and Bartonella IgG orders pending    ASO Titer low     Disposition- continue IV atbs for additional 24 hours- reassess tomorrow for possible transition to PO and consideration for DC on Oral Atbs and work release until seen in the clinic. Remainder of medical problems as per resident note.       Tavia Barrett MD  Internal Medicine Residency Faculty

## 2022-11-05 NOTE — PROGRESS NOTES
Department of Orthopedic Surgery  Resident Progress Note      SUBJECTIVE  Patient seen and examined. Pain controlled. No new complaints. Denies acute fever, chills, nausea, vomiting , chest pain and shortness of breath. OBJECTIVE    Physical    VITALS:  /78   Pulse 78   Temp 98 °F (36.7 °C) (Oral)   Resp 18   Ht 6' 2\" (1.88 m)   Wt 209 lb (94.8 kg)   SpO2 96%   BMI 26.83 kg/m²   MUSCULOSKELETAL:     left upper extremity:  Extremity in Caleb pillow  Significant improvement in erythema at the ahnd  Mild TTP at the dorsum hand however improved from yesterday  Distal sensory intact: MRU  +AIN/PIN/M/R/U nerve function intact grossly  +2/4 Rad pulse  Compartments soft and compressible    Data    CBC:   Lab Results   Component Value Date/Time    WBC 9.8 11/05/2022 04:44 AM    RBC 4.62 11/05/2022 04:44 AM    HGB 13.6 11/05/2022 04:44 AM    HCT 41.7 11/05/2022 04:44 AM    MCV 90.3 11/05/2022 04:44 AM    MCH 29.4 11/05/2022 04:44 AM    MCHC 32.6 11/05/2022 04:44 AM    RDW 13.2 11/05/2022 04:44 AM     11/05/2022 04:44 AM    MPV 11.4 11/05/2022 04:44 AM     PT/INR:  No results found for: PROTIME, INR    Labs  No results for input(s): BC, BLOODCULT2 in the last 72 hours. No results for input(s): CXSURG in the last 72 hours. ASSESSMENT  Left hand cellulitis    PLAN    NWB LUE  ABX coverage per primary  Deep venous thrombosis prophylaxis - per admitting, early mobilization  PT/OT  Pain Control: IV and PO  Patient is improving with IV ABX. Continue IV ABX. Patient can be discharged when medically stable and have coordinated oral abx. Patient will follow up in office in 1 weeks for repeat Xrs and evaluation.

## 2022-11-05 NOTE — PROGRESS NOTES
[facility]  Internal Medicine Residency Program  Progress Note - House Team     Patient:  Osito Tena 59 y.o. male MRN: [id number]     Date of Service: 11/5/2022     CC: left hand pain    Days since admission: 1    Subjective     Overnight events: no overnight events reported    Patient was seen and examined at bedside this morning. He reports much improvement to his hand swelling and pain. Still endorses some amount of pain with certain movements but is much improved from yesterday. Patient agrees to continue IV antibiotics for another night with plans to transition to oral antibiotics tomorrow given continued improvement. Objective     Physical Exam:  Vitals: /70   Pulse 71   Temp 98.2 °F (36.8 °C) (Oral)   Resp 18   Ht 6' 2\" (1.88 m)   Wt 209 lb (94.8 kg)   SpO2 95%   BMI 26.83 kg/m²     I & O - 24hr:   Intake/Output Summary (Last 24 hours) at 11/5/2022 1126  Last data filed at 11/4/2022 1147  Gross per 24 hour   Intake 10 ml   Output --   Net 10 ml      General Appearance: alert, appears stated age, and cooperative  HEENT:  Head: Normocephalic, no lesions, without obvious abnormality. Neck: supple, symmetrical, trachea midline and thyroid not enlarged, symmetric, no tenderness/mass/nodules  Lung: clear to auscultation bilaterally  Heart: regular rate and rhythm, S1, S2 normal, no murmur, click, rub or gallop  Abdomen: soft, non-tender; bowel sounds normal; no masses,  no organomegaly  Extremities:   erythema and warmth of 4th and 5th digit of left hand extending to palm- improved  Musculokeletal: No joint swelling, no muscle tenderness. ROM normal in all joints of extremities.    Neurologic: Mental status: Alert, oriented, thought content appropriate  Subject  Pertinent Information & Imaging Studies, Consults   yazan  CBC:   Lab Results   Component Value Date/Time    WBC 9.8 11/05/2022 04:44 AM    RBC 4.62 11/05/2022 04:44 AM    HGB 13.6 11/05/2022 04:44 AM    HCT 41.7 11/05/2022 04:44 AM    MCV 90.3 11/05/2022 04:44 AM    MCH 29.4 11/05/2022 04:44 AM    MCHC 32.6 11/05/2022 04:44 AM    RDW 13.2 11/05/2022 04:44 AM     11/05/2022 04:44 AM    MPV 11.4 11/05/2022 04:44 AM     CMP:    Lab Results   Component Value Date/Time     11/05/2022 04:44 AM    K 4.1 11/05/2022 04:44 AM     11/05/2022 04:44 AM    CO2 23 11/05/2022 04:44 AM    BUN 13 11/05/2022 04:44 AM    CREATININE 0.9 11/05/2022 04:44 AM    GFRAA >60 11/30/2021 01:23 PM    LABGLOM >60 11/05/2022 04:44 AM    GLUCOSE 83 11/05/2022 04:44 AM    PROT 7.3 11/05/2022 04:44 AM    LABALBU 4.0 11/05/2022 04:44 AM    CALCIUM 9.4 11/05/2022 04:44 AM    BILITOT 0.6 11/05/2022 04:44 AM    ALKPHOS 105 11/05/2022 04:44 AM    AST 33 11/05/2022 04:44 AM    ALT 39 11/05/2022 04:44 AM       IMAGING:   Imaging Studies:    XR HAND LEFT (MIN 3 VIEWS)    Result Date: 11/4/2022  No acute osseous abnormality. Questionable subtle the peak foreign body in the index finger on the radial side. Notable Cultures:      Blood cultures   Blood Culture, Routine   Date Value Ref Range Status   09/22/2021 5 Days no growth  Final     Respiratory cultures No results found for: RESPCULTURE No results found for: LABGRAM  Urine No results found for: LABURIN  Legionella No results found for: LABLEGI  C Diff PCR No results found for: CDIFPCR  Wound culture/abscess: No results for input(s): WNDABS in the last 72 hours. Tip culture:No results for input(s): CXCATHTIP in the last 72 hours. Antibiotic  Days  Day started   ceftriaxone 1 11/4/22   metronidazole 1 11/4/22                  OXYGENATION: room air    DIET: regular diet         Resident's Assessment and Plan     Ashlee Felty is a 59 y.o. male with  has no past medical history on file.   came here with CC   Chief Complaint   Patient presents with    Hand Pain     Pt sent from the clinic for cellulitis- left hand        SUMMARY OF HOSPITAL STAY: Briefly, Caryl Felty is a 59 y.o. male with past medical history of sigmoid colitis (9/2021) who presented to the ED with chief complaint of left hand pain. He was sent from the internal medicine clinic for concerns of tenosynovitis of his left hand. Per the patient, he was scratched by his cat on his left 4th digit on 10/26 while removing the cat from the mouth of his dong. The patient reports he had no symptoms until two days ago when he began to notice redness and warmth on his left hand. He states it has gotten progressively worse and he now has pain when trying to bend his fingers. In the ED x-ray of the left hand was negative. Broad spectrum antibiotics were started including rocephin, flagyl, and vancomycin. Patient was also given a tetanus vaccine. He was admitted and continued on IV rocephin and flagyl. ASSESSMENT & PLAN  Cellulitis vs tenosynovitis Left hand 2/2 cat scratch  - patient scratched by his cat on 10/26  - symptoms including erythema, warmth, edema, decreased range of motion began a few days ago  PLAN  - ortho consulted; recommend IV antibiotics; no surgical intervention at this time  - continue rocephin and flagyl IV; will plan to transition to omnicef and flagyl PO tomorrow if continued improvement  - follow Bartonella IgG, PCR  - follow MRSA culture  - charlotte area of erythema, monitor for expansion     2.  History sigmoid colitis         PT/OT: not indicated at this time  DVT ppx: lovenox  GI ppx: diet     Next of Kin/ POA:   [facility] ([phone])     Code Status:   Full code     Disposition:continue current care      Gisel Rowell MD, PGY-1  Attending physician: Dr. Elizabeth Kline

## 2022-11-05 NOTE — PLAN OF CARE
Problem: Pain  Goal: Verbalizes/displays adequate comfort level or baseline comfort level  11/5/2022 1347 by Lesli Morgan RN  Outcome: Progressing  11/5/2022 0034 by Joel Wood RN  Outcome: Progressing     Problem: ABCDS Injury Assessment  Goal: Absence of physical injury  11/5/2022 1347 by Lesli Morgan RN  Outcome: Progressing  11/5/2022 0034 by Joel Wood RN  Outcome: Progressing

## 2022-11-06 VITALS
HEIGHT: 74 IN | OXYGEN SATURATION: 94 % | DIASTOLIC BLOOD PRESSURE: 81 MMHG | TEMPERATURE: 97.9 F | SYSTOLIC BLOOD PRESSURE: 116 MMHG | HEART RATE: 65 BPM | RESPIRATION RATE: 18 BRPM | WEIGHT: 209 LBS | BODY MASS INDEX: 26.82 KG/M2

## 2022-11-06 LAB
Lab: NORMAL
MRSA CULTURE ONLY: NORMAL
THIS TEST SENT TO: NORMAL

## 2022-11-06 PROCEDURE — 2580000003 HC RX 258: Performed by: INTERNAL MEDICINE

## 2022-11-06 PROCEDURE — 99238 HOSP IP/OBS DSCHRG MGMT 30/<: CPT | Performed by: INTERNAL MEDICINE

## 2022-11-06 PROCEDURE — 6360000002 HC RX W HCPCS: Performed by: INTERNAL MEDICINE

## 2022-11-06 PROCEDURE — 96366 THER/PROPH/DIAG IV INF ADDON: CPT

## 2022-11-06 PROCEDURE — G0378 HOSPITAL OBSERVATION PER HR: HCPCS

## 2022-11-06 PROCEDURE — 2500000003 HC RX 250 WO HCPCS: Performed by: INTERNAL MEDICINE

## 2022-11-06 PROCEDURE — 96372 THER/PROPH/DIAG INJ SC/IM: CPT

## 2022-11-06 PROCEDURE — 6370000000 HC RX 637 (ALT 250 FOR IP): Performed by: INTERNAL MEDICINE

## 2022-11-06 RX ORDER — METRONIDAZOLE 500 MG/1
500 TABLET ORAL ONCE
Status: DISCONTINUED | OUTPATIENT
Start: 2022-11-06 | End: 2022-11-06 | Stop reason: HOSPADM

## 2022-11-06 RX ORDER — METRONIDAZOLE 500 MG/1
500 TABLET ORAL 3 TIMES DAILY
Qty: 30 TABLET | Refills: 0 | Status: SHIPPED | OUTPATIENT
Start: 2022-11-06 | End: 2022-11-16

## 2022-11-06 RX ORDER — CEFDINIR 300 MG/1
600 CAPSULE ORAL DAILY
Qty: 20 CAPSULE | Refills: 0 | Status: SHIPPED | OUTPATIENT
Start: 2022-11-06 | End: 2022-11-16

## 2022-11-06 RX ORDER — CEFDINIR 300 MG/1
600 CAPSULE ORAL ONCE
Status: COMPLETED | OUTPATIENT
Start: 2022-11-06 | End: 2022-11-06

## 2022-11-06 RX ADMIN — ENOXAPARIN SODIUM 40 MG: 100 INJECTION SUBCUTANEOUS at 08:18

## 2022-11-06 RX ADMIN — CEFDINIR 600 MG: 300 CAPSULE ORAL at 13:16

## 2022-11-06 RX ADMIN — METRONIDAZOLE 500 MG: 500 TABLET ORAL at 14:14

## 2022-11-06 RX ADMIN — METRONIDAZOLE 500 MG: 500 INJECTION, SOLUTION INTRAVENOUS at 03:23

## 2022-11-06 RX ADMIN — Medication 10 ML: at 08:18

## 2022-11-06 ASSESSMENT — ENCOUNTER SYMPTOMS
WHEEZING: 0
DIARRHEA: 0
COUGH: 0
VOMITING: 0
NAUSEA: 0

## 2022-11-06 NOTE — PROGRESS NOTES
Department of Orthopedic Surgery  Resident Progress Note      SUBJECTIVE  Patient seen and examined. Pain well controlled. No new complaints. Denies acute fever, chills, nausea, vomiting , chest pain and shortness of breath. OBJECTIVE    Physical    VITALS:  BP (!) 129/90   Pulse 84   Temp 98.3 °F (36.8 °C) (Oral)   Resp 18   Ht 6' 2\" (1.88 m)   Wt 209 lb (94.8 kg)   SpO2 95%   BMI 26.83 kg/m²   MUSCULOSKELETAL:     left upper extremity:  Extremity in Caleb pillow  Erythema of the dorsum aspect the hand completely resolved. Negative TTP at the dorsum hand and wrist.  Able to make a complete fist without any pain. Distal sensory intact: MRU  +AIN/PIN/M/R/U nerve function intact grossly  +2/4 Rad pulse  Compartments soft and compressible    Data    CBC:   Lab Results   Component Value Date/Time    WBC 9.8 11/05/2022 04:44 AM    RBC 4.62 11/05/2022 04:44 AM    HGB 13.6 11/05/2022 04:44 AM    HCT 41.7 11/05/2022 04:44 AM    MCV 90.3 11/05/2022 04:44 AM    MCH 29.4 11/05/2022 04:44 AM    MCHC 32.6 11/05/2022 04:44 AM    RDW 13.2 11/05/2022 04:44 AM     11/05/2022 04:44 AM    MPV 11.4 11/05/2022 04:44 AM     PT/INR:  No results found for: PROTIME, INR    Labs  Recent Labs     11/04/22  1034 11/04/22  1040   BC 24 Hours no growth  --    BLOODCULT2  --  24 Hours no growth     No results for input(s): CXSURG in the last 72 hours. ASSESSMENT  Left hand cellulitis    PLAN    NWB LUE  ABX coverage per primary  Deep venous thrombosis prophylaxis - per admitting, early mobilization  PT/OT  Pain Control: IV and PO  Patient is improving with IV ABX. Continue IV ABX. Patient can be discharged when medically stable and have coordinated oral abx. She can follow-up with primary care provider in 1 week.

## 2022-11-06 NOTE — PROGRESS NOTES
Pt given both doses of oral antibiotics prior to discharge. Pt tolerated both well after an hour of observation. Pt denies any sign/symptoms of reaction.

## 2022-11-06 NOTE — PROGRESS NOTES
[facility]  Internal Medicine Residency / [address]    Attending Physician Statement  I have discussed the case, including pertinent history and exam findings with the resident and the team.  I have seen and examined the patient and the key elements of the encounter have been performed by me. I agree with the assessment, plan and orders as documented by the resident. Case Discussed During AM Rounds   Covering for Dr. Giovanny Dickerson and House Team 2 this weekend   NO overnight events   Patient with continued improvement   Erythema, swelling improved, Full ROM    Patient stopped his IV and did not allow additional IV therapy today anticipating DC to home- anxious for DC   Discussed importance of maintaining antibiotic regimen and obtaining oral Atbs- he states Cefdinir (3rd generation Cephalosporin)- planned conversion from Ceftriaxone which patient is tolerating inpatient- is cost prohibitive at pharmacy- obtained coupon from [employer] for patient to receive Cefdinir- give 1 dose today prior to DC with dose of oral Flagyl   Obtain atbs at home   Anticipatory guidance given at length to monitor closely    Work release until seen in clinic   All questions answered at length     Left Hand Cellulitis- significantly improving    Orthopedics following    Per ortho- low suspicion for Tenosynovitis and complete ROM without current concerns     Hand XR: reviewed- ? Foreign body- coordinate with Ortho- no acute intervention recommended    Ice and elevation- continue at home     Change to oral atbs    Cultures and Bartonella IgG orders pending- follow as outpatient    ASO Titer low     Disposition- DC on oral atbs with work restriction/excuse until seen in office- patient anxious for DC today     Remainder of medical problems as per resident note.       Shadia Barnes MD  Internal Medicine Residency Faculty

## 2022-11-06 NOTE — DISCHARGE INSTRUCTIONS
Internal medicine    Follow ups  Please follow up with the Internal medicine clinic within 7 days of discharge  Please keep all other follow up appointments:  No future appointments. Changes in healthcare   Please take all medications as indicated  Diet: regular diet   Activity: activity as tolerated  New Medications started during this hospital stay  Omnicef   Flagyl  Changes to your medications  None  Medications you should stop taking   none  Additional labs, testing or imaging needed after discharge   None   Even if you are feeling better and not having symptoms do not stop taking antibiotic earlier then prescribed  Do not return to work until seen by the Internal medicine clinic    Please contact us if you have any concerns, wish to change or make an appointment:  Internal medicine clinic   Phone: [phone]  Fax: [phone]  [employer] [address]  Should you have further questions in regards to this visit, you can review your clinical note and after visit summary document on your [employer] account. Other than any new prescriptions given to you today, the list of home medications on this After Visit Summary are based on information provided to us from you and your healthcare providers. This information, including the list, dose, and frequency of medications is only as accurate as the information you provided. If you have any questions or concerns about your home medications, please contact your Primary Care Physician for further clarification.

## 2022-11-07 ENCOUNTER — TELEPHONE (OUTPATIENT)
Dept: INTERNAL MEDICINE | Age: 57
End: 2022-11-07

## 2022-11-07 NOTE — TELEPHONE ENCOUNTER
[facility] Initial Follow Up Call    Outreach made within 2 business days of discharge: Yes    Patient: Cara Galdamez Patient : 1965   MRN: [id number]  Reason for Admission: Cellulitis   Discharge Date: 22       Spoke with: Rajinder Foreman     Discharge department/facility: [facility]     TCM Interactive Patient Contact:  Was patient able to fill all prescriptions: Yes. States he picked it up today    Was patient instructed to bring all medications to the follow-up visit: Yes  Is patient taking all medications as directed in the discharge summary?  Yes  Does patient understand their discharge instructions: Yes  Does patient have questions or concerns that need addressed prior to 7-14 day follow up office visit: no    Scheduled appointment with PCP within 7-14 days    Follow Up  Future Appointments   Date Time Provider Bird Medrano   11/10/2022  8:00 AM Juliann Hubbard MD [facility]       Andrea Naik

## 2022-11-09 LAB
BLOOD CULTURE, ROUTINE: NORMAL
CULTURE, BLOOD 2: NORMAL

## 2022-11-09 NOTE — PROGRESS NOTES
Post-Discharge Transitional Care  Follow Up      Brandon Duran   YOB: 1965    Date of Office Visit:  11/10/2022  Date of Hospital Admission: 11/4/22  Date of Hospital Discharge: 11/6/22  Risk of hospital readmission (high >=14%. Medium >=10%) :Readmission Risk Score: 3.1      Care management risk score Rising risk (score 2-5) and Complex Care (Scores >=6): No Risk Score On File     Non face to face  following discharge, date last encounter closed (first attempt may have been earlier): 11/07/2022    Call initiated 2 business days of discharge: Yes    ASSESSMENT/PLAN:   Cellulitis of finger of left hand  - Continue Omnicef and Flagyl to complete for 10 days. Colitis  - Stable  Flu vaccine need  -     Influenza, AFLURIA QUADV, (age 3 yrs+), IM, PF, 0.5mL  Need for shingles vaccine  -     zoster recombinant adjuvanted vaccine [facility]) 50 MCG/0.5ML SUSR injection; Inject 0.5 mLs into the muscle See Admin Instructions 1 dose now and repeat in 2-6 months, Disp-0.5 mL, R-1Print    Medical Decision Making: straightforward  Return in about 4 months (around 3/10/2023) for PCP check-up. On this date 11/10/2022 I have spent 30 minutes reviewing previous notes, test results and face to face with the patient discussing the diagnosis and importance of compliance with the treatment plan as well as documenting on the day of the visit. Subjective:   HPI:  Follow up of Hospital problems/diagnosis(es):   Patient is a 62-year old male with a past medical history of sigmoid colitis (9/2021) who was admitted on 11/4 - 11/6/22 for left hand pain, concerns for tenosynovitis vs cellulitis. 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 Patient was admitted for further evaluation and management.  He was started on Flagyl and Rocephin as patient had a history of anaphylactic reaction to penicillin. Ortho saw him, low suspicion of flexor tenosynovitis and recommended IV antibiotics for cellulitis. Patient improved on IV antibiotics and was switched to ALEGRE JONAS and oral flagyl on discharge to complete for 10 days. Inpatient course: Discharge summary reviewed- see chart. Interval history/Current status:   Since discharge, patient have been doing well. Swelling has resolved. He denies any fever, chills, chest pain, shortness of breath, hand pain/swelling. He only complains of some soreness on the lateral hand for which he takes ibuprofen as needed. He also has been taking his oral antibiotics, currently on day 4. He still has some erythema on his left hand but swelling has resolved. He is able to move hands without any issues. Patient agreeable to have his flu shot today and interested in getting the shingrix as well. Script for shingrix given to patient. Patient Active Problem List   Diagnosis    Pulmonary nodules    History of cholecystectomy    Actinic keratosis    Lesion of skin of nose    GI bleed    Colitis    Rectal bleeding    Left lower quadrant abdominal pain    Ischemic colitis ([address]    Grade II hemorrhoids    Skin tag    Need for shingles vaccine    Cellulitis       Medications listed as ordered at the time of discharge from hospital     Medication List            Accurate as of November 10, 2022 11:29 AM. If you have any questions, ask your nurse or doctor.                 START taking these medications      Shingrix 50 MCG/0.5ML Susr injection  Generic drug: zoster recombinant adjuvanted vaccine  Inject 0.5 mLs into the muscle See Admin Instructions 1 dose now and repeat in 2-6 months  Started by: Elidia Reis MD            CONTINUE taking these medications      cefdinir 300 MG capsule  Commonly known as: OMNICEF  Take 2 capsules by mouth daily for 10 days     diphenhydrAMINE 25 MG tablet  Commonly known as: BENADRYL     ibuprofen 200 MG tablet  Commonly known as: ADVIL;MOTRIN     metroNIDAZOLE 500 MG tablet  Commonly known as: FLAGYL  Take 1 tablet by mouth 3 times daily for 10 days               Where to Get Your Medications        You can get these medications from any pharmacy    Bring a paper prescription for each of these medications  Shingrix 50 MCG/0.5ML Susr injection           Medications marked \"taking\" at this time  Outpatient Medications Marked as Taking for the 11/10/22 encounter (Office Visit) with Anali Charles MD   Medication Sig Dispense Refill    zoster recombinant adjuvanted vaccine [facility]) 50 MCG/0.5ML SUSR injection Inject 0.5 mLs into the muscle See Admin Instructions 1 dose now and repeat in 2-6 months 0.5 mL 1    cefdinir (OMNICEF) 300 MG capsule Take 2 capsules by mouth daily for 10 days 20 capsule 0    metroNIDAZOLE (FLAGYL) 500 MG tablet Take 1 tablet by mouth 3 times daily for 10 days 30 tablet 0    diphenhydrAMINE (BENADRYL) 25 MG tablet Take 25 mg by mouth every 6 hours as needed for Allergies      ibuprofen (ADVIL;MOTRIN) 200 MG tablet Take 400 mg by mouth every 6 hours as needed for Pain          Medications patient taking as of now reconciled against medications ordered at time of hospital discharge: Yes    A comprehensive review of systems was negative except for what was noted in the HPI.     Objective:    /84 (Site: Right Upper Arm, Position: Sitting, Cuff Size: Medium Adult)   Pulse 70   Temp 98.1 °F (36.7 °C) (Temporal)   Resp 18   Ht 6' 2\" (1.88 m)   Wt 206 lb 11.2 oz (93.8 kg)   SpO2 95% Comment: on room air  BMI 26.54 kg/m²   General Appearance: alert and oriented to person, place and time, well-developed and well-nourished, in no acute distress  Skin: warm and dry and slight erythema noted on L hand  Head: normocephalic and atraumatic  Pulmonary/Chest: clear to auscultation bilaterally- no wheezes, rales or rhonchi, normal air movement, no respiratory distress  Cardiovascular: normal rate, normal S1 and S2, no gallops, intact distal pulses, and no carotid bruits  Abdomen: soft, non-tender, non-distended, normal bowel sounds, no masses or organomegaly  Extremities: no cyanosis, clubbing or edema  Musculoskeletal: normal range of motion, no joint swelling, deformity or tenderness  Neurologic: gait and coordination normal and speech normal      An electronic signature was used to authenticate this note.   --Arsh Courtney MD

## 2022-11-10 ENCOUNTER — OFFICE VISIT (OUTPATIENT)
Dept: INTERNAL MEDICINE | Age: 57
End: 2022-11-10
Payer: MEDICAID

## 2022-11-10 VITALS
HEIGHT: 74 IN | WEIGHT: 206.7 LBS | BODY MASS INDEX: 26.53 KG/M2 | DIASTOLIC BLOOD PRESSURE: 84 MMHG | OXYGEN SATURATION: 95 % | HEART RATE: 70 BPM | RESPIRATION RATE: 18 BRPM | SYSTOLIC BLOOD PRESSURE: 121 MMHG | TEMPERATURE: 98.1 F

## 2022-11-10 DIAGNOSIS — K52.9 COLITIS: ICD-10-CM

## 2022-11-10 DIAGNOSIS — Z23 NEED FOR SHINGLES VACCINE: ICD-10-CM

## 2022-11-10 DIAGNOSIS — L03.012 CELLULITIS OF FINGER OF LEFT HAND: Primary | ICD-10-CM

## 2022-11-10 DIAGNOSIS — Z23 FLU VACCINE NEED: ICD-10-CM

## 2022-11-10 LAB
Lab: NORMAL
REPORT: NORMAL
THIS TEST SENT TO: NORMAL

## 2022-11-10 PROCEDURE — 99214 OFFICE O/P EST MOD 30 MIN: CPT

## 2022-11-10 RX ORDER — ZOSTER VACCINE RECOMBINANT, ADJUVANTED 50 MCG/0.5
0.5 KIT INTRAMUSCULAR SEE ADMIN INSTRUCTIONS
Qty: 0.5 ML | Refills: 1 | Status: SHIPPED | OUTPATIENT
Start: 2022-11-10 | End: 2023-05-09

## 2022-11-10 NOTE — PROGRESS NOTES
[facility]  Internal Medicine Residency Clinic    Attending Physician Statement  I have discussed the case, including pertinent history and exam findings with the resident physician. I have seen and examined the patient and the key elements of the encounter have been performed by me. I agree with the assessment, plan and orders as documented by the resident. I have reviewed all pertinent PMHx, PSHx, FamHx, SocialHx, medications, and allergies and updated history as appropriate. Patient presents for hospital follow up appointment. Patient admitted for hand left hand cellulitis secondary to cat bite. He is doing well with near resolution of erythema. Still on antibiotic rx. Only hx chronic \"colitis\" which has been stable. Remainder of medical problems as per resident note.     Lisbeth Barahona DO  11/10/2022 8:28 AM

## 2022-11-10 NOTE — PATIENT INSTRUCTIONS
Thank you for coming to your follow up appointment   Please take your medications as directed and keep your follow up appointment in 4 months with PCP.   Call our office if you have any questions or concerns at [phone]    Tomas Moss MD

## 2023-02-13 ENCOUNTER — OFFICE VISIT (OUTPATIENT)
Dept: INTERNAL MEDICINE | Age: 58
End: 2023-02-13
Payer: MEDICAID

## 2023-02-13 ENCOUNTER — HOSPITAL ENCOUNTER (OUTPATIENT)
Dept: GENERAL RADIOLOGY | Age: 58
Discharge: HOME | End: 2023-02-15
Payer: MEDICAID

## 2023-02-13 ENCOUNTER — HOSPITAL ENCOUNTER (OUTPATIENT)
Age: 58
Discharge: HOME | End: 2023-02-15
Payer: MEDICAID

## 2023-02-13 VITALS
DIASTOLIC BLOOD PRESSURE: 72 MMHG | RESPIRATION RATE: 20 BRPM | SYSTOLIC BLOOD PRESSURE: 111 MMHG | TEMPERATURE: 98.2 F | BODY MASS INDEX: 26.39 KG/M2 | HEIGHT: 74 IN | HEART RATE: 62 BPM | WEIGHT: 205.6 LBS | OXYGEN SATURATION: 95 %

## 2023-02-13 DIAGNOSIS — M54.13 RADICULOPATHY OF CERVICOTHORACIC REGION: ICD-10-CM

## 2023-02-13 DIAGNOSIS — G62.9 NEUROPATHY: ICD-10-CM

## 2023-02-13 DIAGNOSIS — M54.13 RADICULOPATHY OF CERVICOTHORACIC REGION: Primary | ICD-10-CM

## 2023-02-13 PROCEDURE — 72050 X-RAY EXAM NECK SPINE 4/5VWS: CPT

## 2023-02-13 PROCEDURE — 72072 X-RAY EXAM THORAC SPINE 3VWS: CPT

## 2023-02-13 PROCEDURE — 99212 OFFICE O/P EST SF 10 MIN: CPT

## 2023-02-13 ASSESSMENT — PATIENT HEALTH QUESTIONNAIRE - PHQ9
SUM OF ALL RESPONSES TO PHQ QUESTIONS 1-9: 0
SUM OF ALL RESPONSES TO PHQ9 QUESTIONS 1 & 2: 0
SUM OF ALL RESPONSES TO PHQ QUESTIONS 1-9: 0
1. LITTLE INTEREST OR PLEASURE IN DOING THINGS: 0
SUM OF ALL RESPONSES TO PHQ QUESTIONS 1-9: 0
SUM OF ALL RESPONSES TO PHQ QUESTIONS 1-9: 0
2. FEELING DOWN, DEPRESSED OR HOPELESS: 0

## 2023-02-13 NOTE — PROGRESS NOTES
[facility]  Internal Medicine Residency Clinic  Attending Physician Statement:  Jefferson Valente M.D., F.A.C.P. I have seen/discussed the case, including pertinent history and exam findings with the resident. Time spent with review of medical records/labs- last visits, coordinating care with residents, nurses and patient. Addressed when applicable- Health maintenance issues of vaccinations, depression screening, tobacco cessation    Billiing assessed by medical complexity of case  Patient is seen for fu visit today. -- acute and chronic problems addressed  I agree with the assessment, plan and orders as documented by the resident. 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          I felt that +median N stretch  And +phalens  Likley b/l carpal tunnel with prolonged elbow +/- wrist flexion with overpressure

## 2023-02-13 NOTE — PROGRESS NOTES
Meche Gordon (:  1965) is a 59 y.o. male,Established patient, here for evaluation of the following chief complaint(s):  3 Month Follow-Up, Back Pain (Patient complains pinched nerve between his shoulder blades and into  his lower back), Neck Pain (Patient states that he can't turn his neck from side to side without pain), Numbness (Patient complains of numbness from bilateral elbows to hands . Patient  has to shake them out . ), and Flank Pain (Patient complains right flank pain while driving)         ASSESSMENT/PLAN:  1. Radiculopathy of cervicothoracic region  -     [facility], [place], [address] [place] [address] Road (4-5 VIEWS); Future  -     XR THORACIC SPINE (3 VIEWS); Future    Return in about 2 months (around 2023) for pcp follow up. Subjective   SUBJECTIVE/OBJECTIVE:  HPI    Patient for follow up with PCP. Seen in 11/10/22 for HFU  Cellulitis of finger of left hand   Completed 10 day  omnicef and flagyl course   Symptoms since then ? None, feels much better    Complaining of chronic mid upper back pain for 2 years duration. Radiates to bilateral arms and is associated with numbness and tingling. 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 Reports in arms is especially when driving truck for 1-2 miles. Also reports nonspecific RUQ abdominal pain. IS mild and intermittent. Not related to food or movement. Has had a cholecystectomy and multiple abdominal procedures in the past. No other noted symptoms, will monitor for now. Reports no other complaints. Remainder of other chronic conditions is stable. Review of Systems   All other systems reviewed and are negative. Objective   Physical Exam  Constitutional:       Appearance: Normal appearance. HENT:      Head: Normocephalic and atraumatic. Cardiovascular:      Rate and Rhythm: Normal rate and regular rhythm. Pulses: Normal pulses. Heart sounds: Normal heart sounds. Pulmonary:      Effort: Pulmonary effort is normal.      Breath sounds: Normal breath sounds. Abdominal:      General: Abdomen is flat. Bowel sounds are normal. There is no distension. Palpations: Abdomen is soft. Tenderness: There is no abdominal tenderness. Comments: Multiple abdominal  surgical scars noted    Neurological:      Mental Status: He is alert. Comments: Pain noted with neck flexion and extension   Pain noted with bilateral shoulder flexion and extension  Phalens test negative  Spurling sign negative  No reproducible numbness noted with elbow flexion. An electronic signature was used to authenticate this note.     --Abbi Apple MD

## 2023-02-13 NOTE — PATIENT INSTRUCTIONS
Continue your medications as listed. Use over the counter medications such as lidocaine patch and ibuprofen for pain management. Please wear wrist splints at night and during work to minimize flexion. Please get imaging done before next visit. Referrals have been made to: Physical therapy. If there office does not call in one week. Please call our office to follow up. Call for a sooner appointment if you have additional concerns.       Wendy aGrcia MD  Internal Medicine

## 2023-02-14 PROBLEM — G62.9 NEUROPATHY: Status: ACTIVE | Noted: 2023-02-14

## 2023-02-20 ENCOUNTER — HOSPITAL ENCOUNTER (OUTPATIENT)
Dept: PHYSICAL THERAPY | Age: 58
Setting detail: THERAPIES SERIES
Discharge: HOME | End: 2023-02-20
Payer: MEDICAID

## 2023-02-20 PROCEDURE — 97161 PT EVAL LOW COMPLEX 20 MIN: CPT | Performed by: PHYSICAL THERAPIST

## 2023-02-20 ASSESSMENT — PAIN DESCRIPTION - ORIENTATION: ORIENTATION: RIGHT;LEFT

## 2023-02-20 ASSESSMENT — PAIN DESCRIPTION - LOCATION: LOCATION: NECK

## 2023-02-20 ASSESSMENT — PAIN SCALES - GENERAL: PAINLEVEL_OUTOF10: 8

## 2023-02-20 ASSESSMENT — PAIN DESCRIPTION - PAIN TYPE: TYPE: CHRONIC PAIN

## 2023-02-20 ASSESSMENT — PAIN DESCRIPTION - DESCRIPTORS: DESCRIPTORS: ACHING

## 2023-02-20 NOTE — PROGRESS NOTES
Physical Therapy: Initial Evaluation    Patient: Susan Mix (49 y.o. male)   Examination Date:   Plan of Care Certification Period: 2023 to        :  1965 ;    Confirmed: Yes MRN: [id number]  CSN: [id number]   Insurance: Payor: Nicolle Imus / Plan: Nicolle Imus / Product Type: *No Product type* /   Insurance ID: [id number] - (Medicaid Managed) Secondary Insurance (if applicable):    Referring Physician: Heather Tavera MD     PCP: Gris Guzman MD Visits to Date/Visits Approved:     No Show/Cancelled Appts:   /       Medical Diagnosis: Radiculopathy, cervicothoracic region [M54.13] cervical radiculopathy  Treatment Diagnosis:       PERTINENT MEDICAL HISTORY           Medical History: Chart Reviewed: Yes History reviewed. No pertinent past medical history. Surgical History:   Past Surgical History:   Procedure Laterality Date    ABDOMEN SURGERY      1DAYS OLD    ABDOMINAL ADHESION SURGERY      APPENDECTOMY      CHOLECYSTECTOMY, LAPAROSCOPIC N/A 2019    LAPAROSCOPIC CHOLECYSTECTOMY performed by Thais Wright MD at [address] [place] [address] N/A 2021    COLONOSCOPY WITH BIOPSY performed by Eveline Soto MD at [facility] ENDOSCOPY       Medications:   Current Outpatient Medications:     zoster recombinant adjuvanted vaccine [facility]) 50 MCG/0.5ML SUSR injection, Inject 0.5 mLs into the muscle See Admin Instructions 1 dose now and repeat in 2-6 months, Disp: 0.5 mL, Rfl: 1    diphenhydrAMINE (BENADRYL) 25 MG tablet, Take 25 mg by mouth every 6 hours as needed for Allergies, Disp: , Rfl:     ibuprofen (ADVIL;MOTRIN) 200 MG tablet, Take 400 mg by mouth every 6 hours as needed for Pain, Disp: , Rfl:   Allergies: Asa [aspirin], Pcn [penicillins], Rye grass flower pollen extract [gramineae pollens], and Albuterol sulfate      SUBJECTIVE EXAMINATION      ,           Subjective History:  Onset Date: 02/13/23  Subjective: pt presents to therapy with c/o pain B sides neck/upper back for several yrs of insidious onset; no PMH for neck/B UE injury/sx per pt; x-ray of c-spine + for degeneration at C5-6; MEDS help little; no noted neuro or pain managment  consults noted; c/o slight N/T at times into B forearms; no c/o clicking in the neck with movement; sleep seems fine; RTD for follow-up 4/24/23  Additional Pertinent Hx (if applicable):            Learning/Language: Learning  Does the patient/guardian have any barriers to learning?: No barriers  What is the preferred language of the patient/guardian?: English     Pain Screening    Pain Screening  Patient Currently in Pain: Yes  Pain Assessment: 0-10  Pain Level: 8  Pain Type: Chronic pain  Pain Location: Neck  Pain Orientation: Right, Left  Pain Descriptors: Aching       OBJECTIVE EXAMINATION     Regional Screen:   UE Screen: AROM/strength grossly WNL for all ranges     Observations:   General Observations  General Observations: Yes  Description: forward head/rounded shoulders/B protracted scapulae    Palpation:   Cervical Spine Palpation: discomfort noted across B cervical paraspinals C2-T4 into B upper traps/scap retractors    Endurance:  GOOD for all prolonged activities      Neuro Screen:   Sensation      Sensation  Overall Sensation Status: WNL       Cervical Assessment     AROM Cervical Spine   Cervical spine general AROM: grossly limited ~ 25% WNL for all ranges with no c/o radiculopathy noted           Special Tests:   Special Tests for Cervical Spine  Compression Test : (-)       ASSESSMENT     Impression: Assessment: pain noted across B sides neck/upper back with all prolonged activities, 8/10    Body Structures, Functions, Activity Limitations Requiring Skilled Therapeutic Intervention: Decreased ROM, Decreased endurance    Statement of Medical Necessity: Physical Therapy is both indicated and medically necessary as outlined in the POC to increase the likelihood of meeting the functionally related goals stated below. Patient's Activity Tolerance: Patient tolerated evaluation without incident      Patient's rehabilitation potential/prognosis is considered to be: Fair, Good    Factors which may impact rehabilitation potential include:          GOALS   Patient Goal(s):    Goals Completed by 3 weeks Goal Status   Decrease pain across B sides neck/upper back with all prolonged activities, 0-4/10     Restore cervical  AROM to WNL for all ranges     Improve endurance for all prolonged activities to GOOD/GOOD+     Assure I with HEP for home management of condition                  TREATMENT PLAN     Pt. actively involved in establishing Plan of Care and Goals: Yes    Treatment may include any combination of the following: ROM, Strengthening, Endurance training, Therapeutic activities, Modalities, Home exercise program     Frequency / Duration:  Patient to be seen pt to be seen 2x/week/3 weeks for   weeks      Eval Complexity:    Decision Making: Low Complexity        Therapist Signature: Cindy Perez, PT    Date: 8/74/8131     I certify that the above Therapy Services are being furnished while the patient is under my care. I agree with the treatment plan and certify that this therapy is necessary. Physician's Signature:  ___________________________   Date:_______                                                                   Sherrill Jarvis MD        Physician Comments: _______________________________________________    Please sign and return to [facility] PHYSICAL THERAP[facility]. Please fax to the location listed below.  Alesha Barnes for this referral!    [phone] [id number]  Dept: [phone]       POC NOTE

## 2023-02-20 NOTE — PROGRESS NOTES
[facility]                Phone: [phone]   Fax: [phone]    Physical Therapy Daily Treatment Note  Date:  2023    Patient Name:  Real Garnett    :  1965  MRN: [id number]    Evaluating therapist:  EARNEST Bonilla              (23)  Restrictions/Precautions:    Diagnosis:  cervical radiculopathy  Treatment Diagnosis:    Insurance/Certification information:  [employer]  Referring Physician:  Anastasia Vallecillo. Plan of care signed (Y/N):    Visit# / total visits:    Pain level: 8/10   Time In:  Time Out:    Subjective:      Exercises:  Exercise/Equipment Resistance/Repetitions Other comments   cervical traction:  15lb/10lb                                    30s/10s            UBE             corner st     upper trap st     thoracic st            shrugs     scap ret                                                               Other Therapeutic Activities:      Home Exercise Program:  provided 23    Manual Treatments:      Modalities:  IFC/MH to neck/upper back     Timed Code Treatment Minutes: Total Treatment Minutes:      Treatment/Activity Tolerance:  [] Patient tolerated treatment well [] Patient limited by fatique  [] Patient limited by pain  [] Patient limited by other medical complications  [] Other:     Prognosis: [] Good [] Fair  [] Poor    Patient Requires Follow-up: [] Yes  [] No    Plan:   [] Continue per plan of care [] Alter current plan (see comments)  [] Plan of care initiated [] Hold pending MD visit [] Discharge  Plan for Next Session:      See Weekly Progress Note: []  Yes  []  No  Next due:        Electronically signed by:   Ml Posada PT

## 2023-02-24 ENCOUNTER — HOSPITAL ENCOUNTER (OUTPATIENT)
Dept: PHYSICAL THERAPY | Age: 58
Setting detail: THERAPIES SERIES
Discharge: HOME | End: 2023-02-24
Payer: MEDICAID

## 2023-02-24 PROCEDURE — 97110 THERAPEUTIC EXERCISES: CPT

## 2023-02-24 PROCEDURE — G0283 ELEC STIM OTHER THAN WOUND: HCPCS

## 2023-02-24 PROCEDURE — 97012 MECHANICAL TRACTION THERAPY: CPT

## 2023-02-24 NOTE — PROGRESS NOTES
[facility]                Phone: [phone]   Fax: [phone]    Physical Therapy Daily Treatment Note  Date:  2023    Patient Name:  Bernabe Mcallister    :  1965  MRN: [id number]    Evaluating therapist:  EARNEST Wilkinson              (23)  Restrictions/Precautions:    Diagnosis:  cervical radiculopathy  Treatment Diagnosis:    Insurance/Certification information:  [facility] [employer]  Referring Physician:  EDUARDO Campos  Plan of care signed (Y/N):    Visit# / total visits:     Pain level:  5/10       Time In:      0919  Time Out:   1030    Subjective:  Patient presents for first scheduled treatment session following initial evaluation.  He reports cervical pain 5/10 at rest this morning with pain increased mildly with movement.      Exercises:  Exercise/Equipment Resistance/Repetitions Other comments   UBE 6 min L2         cervical traction:  15lb/10lb   15 min                                  30s/10s            corner st 5 x20s    upper trap st 3 x20s ea L/R    thoracic st 5 x20s           High ext 2 x15  btb    Mid row 2 x15  ptb                           Objective:   Ther. exercise/activity as listed per flow sheet above.  Mech Cervical traction as listed above.  Treatment completed with MH/IFC x 15 minutes.      Assessment:  Patient performs exercises with good effort and pacing.      Goals:    Decrease pain across B sides neck/upper back with all prolonged activities, 0-4/10  Restore cervical  AROM to WNL for all ranges  Improve endurance for all prolonged activities to GOOD/GOOD+  Assure I with HEP for home management of condition    Home Exercise Program:  provided 23    Manual Treatments:      Modalities:  IFC/MH to neck/upper back     Timed Code Treatment Minutes:      Total Treatment Minutes:      Treatment/Activity Tolerance:  [] Patient tolerated treatment well [] Patient limited by fatigue  [] Patient limited by pain  [] Patient limited by  other medical complications  [] Other:     Prognosis: [] Good [] Fair  [] Poor    Patient Requires Follow-up: [x] Yes  [] No    Plan:   [x] Continue per plan of care [] Alter current plan (see comments)  [] Plan of care initiated [] Hold pending MD visit [] Discharge    Plan for Next Session:      See Weekly Progress Note: []  Yes  []  No  Next due:           CPT codes 2/24/2023 Units    Low Complexity PT evaluation 25225 06851     Moderate Complexity PT evaluation  75817     High Complexity PT evaluation 29380     PT Re-evaluation  41587     Mechanical Traction 48967  1   Gait training 37633     Manual therapy  32305     Electrical Stimulation 78659 1   Therapeutic activities  18106    Therapeutic exercises  65795 2   Neuromuscular reeducation  26463            Electronically signed by:  Kody Keenan, PTA [id number]

## 2023-02-27 ENCOUNTER — HOSPITAL ENCOUNTER (OUTPATIENT)
Dept: PHYSICAL THERAPY | Age: 58
Setting detail: THERAPIES SERIES
Discharge: HOME | End: 2023-02-27
Payer: MEDICAID

## 2023-02-27 PROCEDURE — 97012 MECHANICAL TRACTION THERAPY: CPT

## 2023-02-27 PROCEDURE — 97110 THERAPEUTIC EXERCISES: CPT

## 2023-02-27 PROCEDURE — G0283 ELEC STIM OTHER THAN WOUND: HCPCS

## 2023-02-27 NOTE — PROGRESS NOTES
[facility]                Phone: [phone]   Fax: [phone]    Physical Therapy Daily Treatment Note  Date:  2023    Patient Name:  Joseph Bryson    :  1965  MRN: [id number]      Evaluating therapist:  EARNEST Hanley              (23)  Restrictions/Precautions:    Diagnosis:  cervical radiculopathy  Treatment Diagnosis:    Insurance/Certification information:  [address]  Referring Physician:  Herve Vargas. Plan of care signed (Y/N):    Visit# / total visits:   3/6  Pain level: 4/10       Time In:      0829  Time Out:   6820    Subjective:  Patient presents for first of two scheduled treatment sessions this week. He reports cervical pain  4/10 this morning. Exercises:  Exercise/Equipment Resistance/Repetitions Other comments   UBE 6 min L2         cervical traction:  25lb/10lb   15 min                                  30s/10s            doorway st 5 x20s    upper trap st 4 x20s ea L/R    thoracic st 5 x20s           High ext 2 x15  btb    Mid row 2 x15  ptb                           Objective:   Ther. exercise/activity as listed per flow sheet above. Mech Cervical traction as listed above. Treatment completed with MH/IFC x 15 minutes. Assessment:  Patient performs exercises with good effort and pacing. Goals:    Decrease pain across B sides neck/upper back with all prolonged activities, 0-4/10  Restore cervical  AROM to WNL for all ranges  Improve endurance for all prolonged activities to GOOD/GOOD+  Assure I with HEP for home management of condition    Home Exercise Program:  provided 23    Manual Treatments:      Modalities:  IFC/MH to neck/upper back     Timed Code Treatment Minutes:       Total Treatment Minutes:      Treatment/Activity Tolerance:  [] Patient tolerated treatment well [] Patient limited by fatigue  [] Patient limited by pain  [] Patient limited by other medical complications  [] Other:     Prognosis: [] Good [] Fair  [] Poor    Patient Requires Follow-up: [x] Yes  [] No    Plan:   [x] Continue per plan of care [] Alter current plan (see comments)  [] Plan of care initiated [] Hold pending MD visit [] Discharge    Plan for Next Session:      See Weekly Progress Note: []  Yes  []  No  Next due:           CPT codes 2/27/2023 Units    Low Complexity PT evaluation 65894 82156     Moderate Complexity PT evaluation  27090     High Complexity PT evaluation 76660     PT Re-evaluation  40173     Mechanical Traction 33004  1   Gait training 36418     Manual therapy  73122     Electrical Stimulation 52607 1   Therapeutic activities  73816    Therapeutic exercises  96802 2   Neuromuscular reeducation  55100            Electronically signed by:  Kody Foster, PTA [id number]

## 2023-03-03 ENCOUNTER — HOSPITAL ENCOUNTER (OUTPATIENT)
Dept: PHYSICAL THERAPY | Age: 58
Setting detail: THERAPIES SERIES
Discharge: HOME | End: 2023-03-03
Payer: MEDICAID

## 2023-03-03 PROCEDURE — 97110 THERAPEUTIC EXERCISES: CPT

## 2023-03-03 PROCEDURE — 97012 MECHANICAL TRACTION THERAPY: CPT

## 2023-03-03 PROCEDURE — G0283 ELEC STIM OTHER THAN WOUND: HCPCS

## 2023-03-13 ENCOUNTER — HOSPITAL ENCOUNTER (OUTPATIENT)
Dept: PHYSICAL THERAPY | Age: 58
Setting detail: THERAPIES SERIES
Discharge: HOME | End: 2023-03-13
Payer: MEDICAID

## 2023-03-13 PROCEDURE — G0283 ELEC STIM OTHER THAN WOUND: HCPCS

## 2023-03-13 PROCEDURE — 97530 THERAPEUTIC ACTIVITIES: CPT

## 2023-03-13 PROCEDURE — 97110 THERAPEUTIC EXERCISES: CPT

## 2023-03-17 ENCOUNTER — HOSPITAL ENCOUNTER (OUTPATIENT)
Dept: PHYSICAL THERAPY | Age: 58
Setting detail: THERAPIES SERIES
Discharge: HOME | End: 2023-03-17
Payer: MEDICAID

## 2023-03-17 PROCEDURE — 97110 THERAPEUTIC EXERCISES: CPT

## 2023-03-17 PROCEDURE — 97012 MECHANICAL TRACTION THERAPY: CPT

## 2023-03-17 PROCEDURE — G0283 ELEC STIM OTHER THAN WOUND: HCPCS

## 2023-03-17 NOTE — PROGRESS NOTES
[address] [facility]                Phone: [phone]   Fax: [phone]      Physical Therapy  Treatment Summary       Date:  3/17/2023    Patient Name:  Jose Luis Gamez    :  1965  MRN: [id number]      PHYSICAL THERAPIST:  EARNEST Granger   REFERRING PHYSICIAN:  EDUARDO Campos  DIAGNOSIS:  cervical radiculopathy    ATTENDANCE:  Patient has attended 6 of 6 scheduled treatments from 23  to 3/17/23. TREATMENTS RECEIVED:  Therapeutic exercise, stretching, strengthening, postural awareness/positioning training, HEP, modalities. INITIAL STATUS:  c/o pain B sides neck/upper back for several yrs of insidious onset, 8/10  Posture forward head/rounded shoulders/B protracted scapulae  Palpation: discomfort noted across B cervical paraspinals C2-T4 into B upper traps/scap retractors  AROM cervical grossly limited ~ 25% WNL for all ranges   Endurance GOOD for all prolonged activities       FINAL STATUS:  c/o pain B sides neck/upper back  2/10   AROM cervical grossly WNL for all ranges   Endurance GOOD+ for all prolonged activities   Independent with HEP    GOALS:  4 out of 4 Long Term Goals were obtained. LONG TERM GOALS OBTAINED/NOT OBTAINED:   Decrease pain across B sides neck/upper back with all prolonged activities, 0-4/10   ACHIEVED  Restore cervical  AROM to WNL for all ranges   ACHIEVED  Improve endurance for all prolonged activities to GOOD/GOOD+  ACHIEVED  Assure I with HEP for home management of condition   ACHIEVED      PATIENT EDUCATION/INSTRUCTIONS:  Patient instructed on and provided copy of HEP. Electronically Signed by: Eduardo Beck ATC, PTA [id number]   3/17/2023    I have read the above summary and agree with all the content. Patient is discharged from PT care at this time.    Brenda Burks, PT, PT

## 2023-03-17 NOTE — PROGRESS NOTES
[address] [facility]                Phone: [phone]   Fax: [phone]    Physical Therapy Daily Treatment Note      Date:  3/17/2023    Patient Name:  Diane Thomas    :  1965  MRN: [id number]      Evaluating therapist:  EARNEST Sanchez    23  Restrictions/Precautions:    Diagnosis:  cervical radiculopathy  Insurance/Certification information:  [address]  Referring Physician:  Liz Villalobos. Plan of care signed (Y/N):    Visit# / total visits:     Pain level:  2/10       Time In:      820  Time Out:    920     Subjective:  Patient presents for final scheduled treatment session  He reports pain BL upper traps  2/10 this morning. He states his neck is moving much better now. Exercises:  Exercise/Equipment Resistance/Repetitions Other comments   UBE 6 min L3         cervical traction:  25lb/10lb   15 min                                  30s/10s            doorway st 5 x30s    upper trap st 5 x30s ea L/R    thoracic st 5 x30s           High ext 2 x15  btb    Mid row 2 x15  ptb                             Objective:   Ther. exercise/activity as listed per flow sheet above. Mech Cervical traction as listed above. Treatment completed with MH/IFC x 15 minutes. AROM cervical minimally limited all ranges    Assessment:  Patient performs exercises with good effort and pacing. Endurance GOOD+    Goals:    Decrease pain across B sides neck/upper back with all prolonged activities, 0-4/10  Restore cervical  AROM to WNL for all ranges  Improve endurance for all prolonged activities to GOOD/GOOD+  Assure I with HEP for home management of condition    Home Exercise Program:  provided 23    Manual Treatments:      Modalities:  IFC/MH to neck/upper back x 15 mintues    Timed Code Treatment Minutes:       Total Treatment Minutes:      Treatment/Activity Tolerance:  [x] Patient tolerated treatment well [] Patient limited by fatigue  [] Patient limited by pain  [] Patient limited by other medical complications  [] Other:     Prognosis: [] Good [] Fair  [] Poor    Patient Requires Follow-up: [] Yes  [x] No    Plan:   [] Continue per plan of care [] Alter current plan (see comments)  [] Plan of care initiated [] Hold pending MD visit [] Discharge      See Weekly Progress Note:        [x]  Yes  []  No  Next due:           CPT codes 3/17/2023 Units    Low Complexity PT evaluation 32190 31448     Moderate Complexity PT evaluation  83445     High Complexity PT evaluation 61864     PT Re-evaluation  10839     Mechanical Traction 79286 1   Gait training 96096     Manual therapy  97388     Electrical Stimulation 84221 1   Therapeutic activities  01690    Therapeutic exercises  82496 2   Neuromuscular reeducation  66895            Electronically signed by:  Kody Cash, PTA [id number]

## 2023-04-24 ENCOUNTER — OFFICE VISIT (OUTPATIENT)
Dept: INTERNAL MEDICINE | Age: 58
End: 2023-04-24
Payer: MEDICAID

## 2023-04-24 VITALS
TEMPERATURE: 98.9 F | HEART RATE: 63 BPM | RESPIRATION RATE: 16 BRPM | OXYGEN SATURATION: 95 % | BODY MASS INDEX: 26.77 KG/M2 | SYSTOLIC BLOOD PRESSURE: 128 MMHG | HEIGHT: 74 IN | DIASTOLIC BLOOD PRESSURE: 81 MMHG | WEIGHT: 208.6 LBS

## 2023-04-24 DIAGNOSIS — R10.11 RIGHT UPPER QUADRANT ABDOMINAL PAIN: Primary | ICD-10-CM

## 2023-04-24 PROCEDURE — 3017F COLORECTAL CA SCREEN DOC REV: CPT

## 2023-04-24 PROCEDURE — 99213 OFFICE O/P EST LOW 20 MIN: CPT

## 2023-04-24 PROCEDURE — G8427 DOCREV CUR MEDS BY ELIG CLIN: HCPCS

## 2023-04-24 PROCEDURE — G8419 CALC BMI OUT NRM PARAM NOF/U: HCPCS

## 2023-04-24 PROCEDURE — 99212 OFFICE O/P EST SF 10 MIN: CPT

## 2023-04-24 PROCEDURE — 1036F TOBACCO NON-USER: CPT

## 2023-04-24 RX ORDER — PANTOPRAZOLE SODIUM 40 MG/1
40 TABLET, DELAYED RELEASE ORAL
Qty: 30 TABLET | Refills: 2 | Status: SHIPPED | OUTPATIENT
Start: 2023-04-24

## 2023-04-24 ASSESSMENT — ENCOUNTER SYMPTOMS
DIARRHEA: 0
SORE THROAT: 0
CHOKING: 0
ABDOMINAL PAIN: 0
BACK PAIN: 0
VOMITING: 0
NAUSEA: 0
CHEST TIGHTNESS: 0
CONSTIPATION: 0
RHINORRHEA: 0
BLOOD IN STOOL: 0
SHORTNESS OF BREATH: 0
WHEEZING: 0
COUGH: 1
ABDOMINAL DISTENTION: 0

## 2023-04-24 NOTE — PROGRESS NOTES
Levy Bowman (:  1965) is a 59 y.o. male,Established patient, here for evaluation of the following chief complaint(s):  Neck Pain (Results of xrays  completed pt)         ASSESSMENT/PLAN:  1. Right upper quadrant abdominal pain  -     pantoprazole (PROTONIX) 40 MG tablet; Take 1 tablet by mouth every morning (before breakfast), Disp-30 tablet, R-2Normal      Return in about 6 months (around 10/24/2023) for pcp follow up. Subjective   SUBJECTIVE/OBJECTIVE:    Patient here for PCP follow up     Last seen complaining of MSK pain. Cervical spine - xray:  Minimal degenerative changes seen of C5 and C6 with no evidence of acute   fracture or dislocation. PT was referred. He was compliant with appointments and reports significant improvement     Reports wife had URTI symptoms 1 month ago. He had a sore throat at the same time. Sore throat resolved. For 1 month he has been complaining of a chronic cough with white productive sputum. Denies any other symptoms. No shortness of breath, fever, no upper respiratory tract symptoms. He has been using NyQuil at night and reports improvement. We will continue supportive therapy likely postinfectious cough    Patient reports right upper quadrant pain and epigastric pain is still present. Had gallbladder removed. Beleives it is likely related to food intake and coffee. Will attempt PPI trial.    Review of Systems   Constitutional:  Negative for activity change, appetite change, chills, diaphoresis, fatigue, fever and unexpected weight change. HENT:  Negative for postnasal drip, rhinorrhea, sneezing and sore throat. Respiratory:  Positive for cough. Negative for choking, chest tightness, shortness of breath and wheezing. Cardiovascular:  Negative for chest pain, palpitations and leg swelling. Gastrointestinal:  Negative for abdominal distention, abdominal pain, blood in stool, constipation, diarrhea, nausea and vomiting.    Genitourinary:

## 2023-04-24 NOTE — PROGRESS NOTES
[facility]  [facility]  Attending Physician Statement:  Tony Faulkner M.D., F.A.C.P. Patient is seen for fu visit today. -- acute and chronic problems addressed  Addressed when applicable- Health maintenance issues of vaccinations, social determinants/depression/CA screening, tobacco cessation etc...   I have discussed the case, including pertinent history and exam findings with the resident, review of last visit medical records/labs-   I agree with the assessment, plan and orders as documented by the resident.    -Gurmeet Vitale assessed by medical complexity of case  My assessment of high points of todays visit as follows:      Improved mid/low back pain     Mild DDD changes on imaging  Sent for PT- very helpful     Greasy food seems to exac R UQ pain/coffee also exac  Trial PPI  Rule out gallbladder    Recent URI- sorethroat improved but persistent cough  Not wheezing  Supportive care

## 2023-04-24 NOTE — PATIENT INSTRUCTIONS
Continue your medications as listed. Start taking Protonix. Take 1 tablet daily before you largest meal of the day. Continue to work with physical therapy for back pain. Your are likely experiencing a post-infectious cough. Increase your fluid intake. Continue supportive care with over the counter medication such as the dayquil and nyquil you are taking   Call for a sooner appointment if you have additional concerns.         Job Lam MD  Internal Medicine

## 2023-05-05 NOTE — PROGRESS NOTES
[facility]                Phone: [phone]   Fax: [phone]    Physical Therapy Daily Treatment Note      Date:  3/13/2023    Patient Name:  Michelle Grajeda    :  1965  MRN: [id number]      Evaluating therapist:  EARNEST Alejandra    23  Restrictions/Precautions:    Diagnosis:  cervical radiculopathy  Insurance/Certification information:  [address]  Referring Physician:  Mildred Carnes. Plan of care signed (Y/N):    Visit# / total visits:     Pain level: 3/10       Time In:      0825  Time Out:    1036    Subjective:  Patient presents for first of two scheduled treatment sessions this week. He reports pain BL upper traps 3/10 this morning. Exercises:  Exercise/Equipment Resistance/Repetitions Other comments   UBE 6 min L3         cervical traction:  25lb/10lb   15 min                                  30s/10s            doorway st 5 x20s    upper trap st 4 x20s ea L/R    thoracic st 5 x20s           High ext 2 x15  btb    Mid row 2 x15  ptb                             Objective:   Ther. exercise/activity as listed per flow sheet above. Mech Cervical traction as listed above. Treatment completed with MH/IFC x 15 minutes. AROM cervical minimally limited all ranges    Assessment:  Patient performs exercises with good effort and pacing. Endurance GOOD/GOOD+    Goals:    Decrease pain across B sides neck/upper back with all prolonged activities, 0-4/10  Restore cervical  AROM to WNL for all ranges  Improve endurance for all prolonged activities to GOOD/GOOD+  Assure I with HEP for home management of condition    Home Exercise Program:  provided 23    Manual Treatments:      Modalities:  IFC/MH to neck/upper back x 15 mintues    Timed Code Treatment Minutes:       Total Treatment Minutes:      Treatment/Activity Tolerance:  [x] Patient tolerated treatment well [] Patient limited by fatigue  [] Patient limited by pain  [] Patient limited by other medical complications  [] Other:     Prognosis: [] Good [] Fair  [] Poor    Patient Requires Follow-up: [x] Yes  [] No    Plan:   [x] Continue per plan of care [] Alter current plan (see comments)  [] Plan of care initiated [] Hold pending MD visit [] Discharge    Plan for Next Session:      See Weekly Progress Note: []  Yes  []  No  Next due:           CPT codes 3/13/2023 Units    Low Complexity PT evaluation 45506 81346     Moderate Complexity PT evaluation  83794     High Complexity PT evaluation 43697     PT Re-evaluation  80084     Mechanical Traction 94513 1   Gait training 95954     Manual therapy  79408     Electrical Stimulation 47786 1   Therapeutic activities  10432    Therapeutic exercises  09401 2   Neuromuscular reeducation  57078            Electronically signed by:  DENIZ DIAZ ATC, PTA [id number]   Yes

## 2023-08-14 DIAGNOSIS — R10.11 RIGHT UPPER QUADRANT ABDOMINAL PAIN: ICD-10-CM

## 2023-08-14 RX ORDER — PANTOPRAZOLE SODIUM 40 MG/1
TABLET, DELAYED RELEASE ORAL
Qty: 90 TABLET | Refills: 0 | Status: SHIPPED | OUTPATIENT
Start: 2023-08-14

## 2023-11-03 ENCOUNTER — OFFICE VISIT (OUTPATIENT)
Dept: INTERNAL MEDICINE | Age: 58
End: 2023-11-03
Payer: MEDICAID

## 2023-11-03 ENCOUNTER — HOSPITAL ENCOUNTER (OUTPATIENT)
Age: 58
Discharge: HOME | End: 2023-11-03
Payer: MEDICAID

## 2023-11-03 VITALS
DIASTOLIC BLOOD PRESSURE: 65 MMHG | RESPIRATION RATE: 18 BRPM | HEIGHT: 74 IN | HEART RATE: 54 BPM | SYSTOLIC BLOOD PRESSURE: 94 MMHG | BODY MASS INDEX: 25.7 KG/M2 | TEMPERATURE: 97.6 F | OXYGEN SATURATION: 98 % | WEIGHT: 200.3 LBS

## 2023-11-03 DIAGNOSIS — R10.11 RIGHT UPPER QUADRANT ABDOMINAL PAIN: ICD-10-CM

## 2023-11-03 DIAGNOSIS — Z23 NEED FOR IMMUNIZATION AGAINST INFLUENZA: ICD-10-CM

## 2023-11-03 DIAGNOSIS — M89.8X1 PAIN OF LEFT SCAPULA: Primary | ICD-10-CM

## 2023-11-03 LAB
CHOLEST SERPL-MCNC: 249 MG/DL
HBA1C MFR BLD: 5.7 % (ref 4–5.6)
HDLC SERPL-MCNC: 46 MG/DL
LDLC SERPL CALC-MCNC: 172 MG/DL
TRIGL SERPL-MCNC: 153 MG/DL
VLDLC SERPL CALC-MCNC: 31 MG/DL

## 2023-11-03 PROCEDURE — 99212 OFFICE O/P EST SF 10 MIN: CPT

## 2023-11-03 PROCEDURE — 1036F TOBACCO NON-USER: CPT

## 2023-11-03 PROCEDURE — 80061 LIPID PANEL: CPT

## 2023-11-03 PROCEDURE — 36415 COLL VENOUS BLD VENIPUNCTURE: CPT

## 2023-11-03 PROCEDURE — G8419 CALC BMI OUT NRM PARAM NOF/U: HCPCS

## 2023-11-03 PROCEDURE — 99213 OFFICE O/P EST LOW 20 MIN: CPT

## 2023-11-03 PROCEDURE — G8482 FLU IMMUNIZE ORDER/ADMIN: HCPCS

## 2023-11-03 PROCEDURE — 3017F COLORECTAL CA SCREEN DOC REV: CPT

## 2023-11-03 PROCEDURE — G8427 DOCREV CUR MEDS BY ELIG CLIN: HCPCS

## 2023-11-03 PROCEDURE — 90686 IIV4 VACC NO PRSV 0.5 ML IM: CPT | Performed by: INTERNAL MEDICINE

## 2023-11-03 PROCEDURE — 83036 HEMOGLOBIN GLYCOSYLATED A1C: CPT

## 2023-11-03 RX ORDER — PANTOPRAZOLE SODIUM 40 MG/1
40 TABLET, DELAYED RELEASE ORAL
Qty: 90 TABLET | Refills: 0 | Status: SHIPPED | OUTPATIENT
Start: 2023-11-03

## 2023-11-03 ASSESSMENT — ENCOUNTER SYMPTOMS
COUGH: 0
BLOOD IN STOOL: 0
SORE THROAT: 0
BACK PAIN: 0
CHEST TIGHTNESS: 0
SHORTNESS OF BREATH: 0
CONSTIPATION: 0
ABDOMINAL PAIN: 0
VOMITING: 0
CHOKING: 0
DIARRHEA: 0
RHINORRHEA: 0
ABDOMINAL DISTENTION: 0
NAUSEA: 0
WHEEZING: 0

## 2023-11-03 NOTE — PATIENT INSTRUCTIONS
Continue your medications as listed  Please get labs done before next visit. Referrals have been made to: Sports Medicine. If there office does not call in one week. Please call our office to follow up. Call for a sooner appointment if you have additional concerns.         Dacia Orona MD  Internal Medicine

## 2023-11-03 NOTE — PROGRESS NOTES
[address] [place] [address]  Internal Medicine Residency Clinic    Attending Physician Statement  I have discussed the case, including pertinent history and exam findings with the resident physician. I agree with the assessment, plan and orders as documented by the resident. I have reviewed all pertinent PMHx, PSHx, FamHx, SocialHx, medications, and allergies and updated history as appropriate. Patient here for routine follow up of medical problems. Long standing bradycardia. No structural issues on Echo, EF 55% with stage I DD. He is asymptomatic. Reflux symptoms have resolved with PPI  Cervical mild DDD with radiculopathy. Paresthesia has increased again after stopping PT. No symptoms with exertion. No chest pain, no nausea, no shortness of breath. No hx of DM, LDL elevated with ascvd 5% (2021). FHx of cardiac death because of \"enlarged heart\". No hx of MI, stroke. Less likely cardiac. Will refer to Dr. Kirt Marroquin for additional management. Remainder of medical problems as per resident note. Irina Pryor MD  11/3/2023 3:19 PM
Flu vaccine was given per ordered in right upper arm without no distress noted Printed VIS was given
Euvolemic on physical examination    Abdominal pain has resolved since starting Protonix. We will refill. Review of Systems   Constitutional:  Negative for activity change, appetite change, chills, diaphoresis, fatigue, fever and unexpected weight change. HENT:  Negative for postnasal drip, rhinorrhea, sneezing and sore throat. Respiratory:  Negative for cough, choking, chest tightness, shortness of breath and wheezing. Cardiovascular:  Negative for chest pain, palpitations and leg swelling. Gastrointestinal:  Negative for abdominal distention, abdominal pain, blood in stool, constipation, diarrhea, nausea and vomiting. Genitourinary:  Negative for dysuria, enuresis and urgency. Musculoskeletal:  Positive for arthralgias and neck pain. Negative for back pain and joint swelling. Neurological:  Negative for light-headedness and headaches. Objective   Physical Exam  Constitutional:       Appearance: Normal appearance. HENT:      Head: Normocephalic and atraumatic. Cardiovascular:      Rate and Rhythm: Normal rate and regular rhythm. Pulses: Normal pulses. Heart sounds: Normal heart sounds. Pulmonary:      Effort: Pulmonary effort is normal.      Breath sounds: Normal breath sounds. Abdominal:      General: Abdomen is flat. Musculoskeletal:         General: No swelling or tenderness. Normal range of motion. Cervical back: Normal range of motion. Right lower leg: No edema. Left lower leg: No edema. Neurological:      Mental Status: He is alert. Psychiatric:         Mood and Affect: Mood normal.                  An electronic signature was used to authenticate this note.     --Sylvester Pandey MD

## 2023-11-10 ENCOUNTER — OFFICE VISIT (OUTPATIENT)
Dept: ORTHOPEDIC SURGERY | Age: 58
End: 2023-11-10
Payer: MEDICAID

## 2023-11-10 VITALS — BODY MASS INDEX: 25.67 KG/M2 | WEIGHT: 200 LBS | HEIGHT: 74 IN

## 2023-11-10 DIAGNOSIS — M79.602 LEFT ARM PAIN: Primary | ICD-10-CM

## 2023-11-10 DIAGNOSIS — M54.12 CERVICAL RADICULOPATHY: ICD-10-CM

## 2023-11-10 PROCEDURE — 3017F COLORECTAL CA SCREEN DOC REV: CPT | Performed by: FAMILY MEDICINE

## 2023-11-10 PROCEDURE — G8482 FLU IMMUNIZE ORDER/ADMIN: HCPCS | Performed by: FAMILY MEDICINE

## 2023-11-10 PROCEDURE — G8419 CALC BMI OUT NRM PARAM NOF/U: HCPCS | Performed by: FAMILY MEDICINE

## 2023-11-10 PROCEDURE — G8428 CUR MEDS NOT DOCUMENT: HCPCS | Performed by: FAMILY MEDICINE

## 2023-11-10 PROCEDURE — 1036F TOBACCO NON-USER: CPT | Performed by: FAMILY MEDICINE

## 2023-11-10 PROCEDURE — 99204 OFFICE O/P NEW MOD 45 MIN: CPT | Performed by: FAMILY MEDICINE

## 2023-11-10 RX ORDER — GABAPENTIN 100 MG/1
100 CAPSULE ORAL NIGHTLY
Qty: 30 CAPSULE | Refills: 0 | Status: SHIPPED | OUTPATIENT
Start: 2023-11-10 | End: 2023-12-10

## 2023-11-10 RX ORDER — PREDNISONE 20 MG/1
20 TABLET ORAL DAILY
Qty: 7 TABLET | Refills: 0 | Status: SHIPPED | OUTPATIENT
Start: 2023-11-10 | End: 2023-11-17

## 2023-11-10 NOTE — PROGRESS NOTES
[facility]  PRIMARY CARE SPORTS MEDICINE  DATE OF VISIT : 11/10/2023    Patient : Nelli Euceda  Age : 59 y.o.  : 1965  MRN : [id number]   ______________________________________________________________________    Chief Complaint :   Chief Complaint   Patient presents with    Shoulder Pain     Left side  feels like a pinched nerve  from bck of shoulder to front of fuller and down arm for a few week  nothing like this before       HPI : Nelli Euceda is 59 y.o. male who presented to the clinic today for evaluation of left arm pain. Onset of the symptoms was several months ago, with no known mechanism of injury. Current symptoms include periscapular pain that radiates to the left arm and chest.  Patient denies numbness and tingling. Pain is aggravated by any weight bearing activity. Evaluation to date: XRs of the cervical spine which demonstrate no acute fractures or subluxations. Treatment to date: avoidance of offending activity formal physical therapy (2023-3/17/2023, 6 sessions) and OTC analgesics which are not very effective. Past Medical History :  No past medical history on file. Past Surgical History:   Procedure Laterality Date    ABDOMEN SURGERY      1DAYS OLD    ABDOMINAL ADHESION SURGERY      APPENDECTOMY  1970    CHOLECYSTECTOMY, LAPAROSCOPIC N/A 2019    LAPAROSCOPIC CHOLECYSTECTOMY performed by Dennys Hobbs MD at [facility] [address] N/A 2021    COLONOSCOPY WITH BIOPSY performed by Omar Mary MD at [address] [place] [address] Pkwy :    Allergies   Allergen Reactions    Asa [Aspirin] Anaphylaxis    Pcn [Penicillins] Anaphylaxis    Rye Grass Flower Pollen Extract [Gramineae Pollens]     Albuterol Sulfate Rash       Medication List :    Current Outpatient Medications   Medication Sig Dispense Refill    predniSONE (DELTASONE) 20 MG tablet Take 1 tablet by mouth daily for 7 days 7 tablet 0    gabapentin (NEURONTIN) 100 MG capsule Take 1 capsule by mouth nightly

## 2023-12-07 DIAGNOSIS — M54.12 CERVICAL RADICULOPATHY: ICD-10-CM

## 2023-12-07 DIAGNOSIS — M79.602 LEFT ARM PAIN: ICD-10-CM

## 2023-12-07 DIAGNOSIS — R10.11 RIGHT UPPER QUADRANT ABDOMINAL PAIN: ICD-10-CM

## 2023-12-08 RX ORDER — PANTOPRAZOLE SODIUM 40 MG/1
40 TABLET, DELAYED RELEASE ORAL
Qty: 90 TABLET | Refills: 0 | OUTPATIENT
Start: 2023-12-08

## 2024-01-08 ENCOUNTER — TELEPHONE (OUTPATIENT)
Dept: INTERNAL MEDICINE | Age: 59
End: 2024-01-08

## 2024-01-08 DIAGNOSIS — E78.00 HYPERCHOLESTEREMIA: Primary | ICD-10-CM

## 2024-01-08 RX ORDER — ATORVASTATIN CALCIUM 10 MG/1
10 TABLET, FILM COATED ORAL DAILY
Qty: 90 TABLET | Refills: 0 | Status: SHIPPED
Start: 2024-01-08 | End: 2024-02-16 | Stop reason: SDUPTHER

## 2024-01-08 NOTE — TELEPHONE ENCOUNTER
The 10-year ASCVD risk score (Valerie SHOOK, et al., 2019) is: 7.1%    I called and discussed with the patient his cholesterol lab findings. I discussed the risks and benefits of starting a moderate-intensity statin. Patient agreed to start atorvastatin 10 mg daily.     Electronically signed by Burak Centeno MD on 1/8/2024 at 4:39 PM

## 2024-02-05 RX ORDER — GABAPENTIN 100 MG/1
100 CAPSULE ORAL NIGHTLY
Qty: 90 CAPSULE | OUTPATIENT
Start: 2024-02-05

## 2024-02-16 ENCOUNTER — OFFICE VISIT (OUTPATIENT)
Dept: INTERNAL MEDICINE | Age: 59
End: 2024-02-16
Payer: MEDICAID

## 2024-02-16 VITALS
OXYGEN SATURATION: 96 % | BODY MASS INDEX: 25.57 KG/M2 | WEIGHT: 199.2 LBS | HEIGHT: 74 IN | HEART RATE: 61 BPM | SYSTOLIC BLOOD PRESSURE: 124 MMHG | DIASTOLIC BLOOD PRESSURE: 80 MMHG | TEMPERATURE: 98.2 F | RESPIRATION RATE: 20 BRPM

## 2024-02-16 DIAGNOSIS — R10.11 RIGHT UPPER QUADRANT ABDOMINAL PAIN: ICD-10-CM

## 2024-02-16 DIAGNOSIS — E78.00 HYPERCHOLESTEREMIA: ICD-10-CM

## 2024-02-16 DIAGNOSIS — M54.12 CERVICAL RADICULOPATHY: ICD-10-CM

## 2024-02-16 DIAGNOSIS — L98.9 EXTERNAL NASAL LESION: Primary | ICD-10-CM

## 2024-02-16 PROCEDURE — 1036F TOBACCO NON-USER: CPT

## 2024-02-16 PROCEDURE — G8419 CALC BMI OUT NRM PARAM NOF/U: HCPCS

## 2024-02-16 PROCEDURE — 3017F COLORECTAL CA SCREEN DOC REV: CPT

## 2024-02-16 PROCEDURE — G8482 FLU IMMUNIZE ORDER/ADMIN: HCPCS

## 2024-02-16 PROCEDURE — G8427 DOCREV CUR MEDS BY ELIG CLIN: HCPCS

## 2024-02-16 PROCEDURE — 99213 OFFICE O/P EST LOW 20 MIN: CPT

## 2024-02-16 RX ORDER — FAMOTIDINE 20 MG/1
20 TABLET, FILM COATED ORAL DAILY PRN
Qty: 60 TABLET | Refills: 3 | Status: SHIPPED | OUTPATIENT
Start: 2024-02-16

## 2024-02-16 RX ORDER — ATORVASTATIN CALCIUM 10 MG/1
10 TABLET, FILM COATED ORAL DAILY
Qty: 90 TABLET | Refills: 1 | Status: SHIPPED | OUTPATIENT
Start: 2024-02-16

## 2024-02-16 RX ORDER — PANTOPRAZOLE SODIUM 40 MG/1
40 TABLET, DELAYED RELEASE ORAL
Qty: 90 TABLET | Refills: 1 | Status: CANCELLED | OUTPATIENT
Start: 2024-02-16

## 2024-02-16 NOTE — PATIENT INSTRUCTIONS
FINANCIAL ASSISTANCE RESOURCES      Help Network  Website:https://www.helpnetworkneo.org/  [facility] [phone]  [facility] [phone]  [place] [phone]  Megan [phone]    [facility]  Phone: [phone]  [email or web address]    [facility]CAP ([facility] Residents Only)  [address] [place] [address]  Phone: [phone]  [email or web address]    [facility] ([facility] Residents Only)  [address] [place] [address]  Phone: [phone]   [email or web address]    [facility] ([facility] Residents Only)  [address] [place] [address]  Phone:[phone]  [email or web address]      HEALTHCARE FINANCIAL ASSISTANCE RESOURCES  [facility] Financial Assistance  Phone: [phone]  Call if you are looking for financial assistance with a bill for services provided by a [facility] doctor or hospital      [facility] Prescription Assistance   Phone: [phone]  Can apply assistance if under-insured or without drug coverage and/or meets income guidelines. Call to further determine eligibility and to initiate application process    [employer] RX:  https://www.LegUP/    Phone: [phone]  RX Outreach: https://rxoutreach.org/   Phone: 5-024-[phone]-9149 ([phone])    [facility]   [address] [place] [address]   Phone: [phone]   Contact: Rudolph Ram   [facility]. Offers free cataract surgery for people who have no insurance and no means to pay. Call for application.     [place] [facility] Insurance Information Program (OSHIIP)   [address] [place] [address]   Phone: [phone]  Website: insurance.[place].gov  Free health insurance information and services for people with Medicare. Assists with understanding Medicare coverage for seniors

## 2024-02-16 NOTE — PROGRESS NOTES
[facility]  Internal Medicine Residency Clinic    Attending Physician Statement  I have discussed the case, including pertinent history and exam findings with the resident physician.I have seen and examined the patient and the key elements of the encounter have been performed by me. I agree with the assessment, plan and orders as documented by the resident. I have reviewed the relevant PMHx, PSHx, FamHx, SocialHx, medications, and allergies and updated history as appropriate.    Patient presents for routine follow up of medical problems.    Right nasal lesion, suspicious for BCC  Recommend referral to plastic surgery for possible bx/resection    Chronic diarrhea, longstanding s/p multiple prior abd surgeries    Reports worse over past couple weeks without anxiety   Wean PPI as GERD symptoms resolved, H2 blocker prn    Cervical radiculopathy  Improved s/p gabapentin & steroid course; monitor for symptom recurrence    HLD  Counseled adherence to statin therapy with near doubling of recent cholesterol numbers.    Remainder of medical problems as per resident note.    Cornelius Suresh DO  2/16/2024 8:36 AM    
postnasal drip, rhinorrhea, sneezing and sore throat.    Respiratory:  Negative for cough, choking, chest tightness, shortness of breath and wheezing.    Cardiovascular:  Negative for chest pain, palpitations and leg swelling.   Gastrointestinal:  Positive for diarrhea. Negative for abdominal distention, abdominal pain, blood in stool, constipation, nausea and vomiting.   Genitourinary:  Negative for dysuria, enuresis and urgency.   Musculoskeletal:  Negative for arthralgias, back pain, joint swelling and neck pain.   Neurological:  Negative for light-headedness and headaches.          Objective   Physical Exam           An electronic signature was used to authenticate this note.    --Burak Centeno MD

## 2024-03-29 ENCOUNTER — OFFICE VISIT (OUTPATIENT)
Dept: SURGERY | Age: 59
End: 2024-03-29

## 2024-03-29 VITALS
BODY MASS INDEX: 26.37 KG/M2 | SYSTOLIC BLOOD PRESSURE: 110 MMHG | HEART RATE: 78 BPM | OXYGEN SATURATION: 97 % | DIASTOLIC BLOOD PRESSURE: 70 MMHG | RESPIRATION RATE: 20 BRPM | HEIGHT: 74 IN | WEIGHT: 205.5 LBS | TEMPERATURE: 97.9 F

## 2024-03-29 DIAGNOSIS — L98.9 SKIN LESION: Primary | ICD-10-CM

## 2024-03-29 RX ORDER — LOPERAMIDE HYDROCHLORIDE 2 MG/1
2 CAPSULE ORAL 4 TIMES DAILY PRN
COMMUNITY

## 2024-03-29 RX ORDER — LIDOCAINE HYDROCHLORIDE AND EPINEPHRINE 10; 10 MG/ML; UG/ML
1 INJECTION, SOLUTION INFILTRATION; PERINEURAL ONCE
Status: COMPLETED | OUTPATIENT
Start: 2024-03-29 | End: 2024-03-29

## 2024-03-29 RX ADMIN — LIDOCAINE HYDROCHLORIDE AND EPINEPHRINE 1 ML: 10; 10 INJECTION, SOLUTION INFILTRATION; PERINEURAL at 09:58

## 2024-03-29 NOTE — PROGRESS NOTES
MG tablet Take 2 tablets by mouth every 6 hours as needed for Pain (Patient not taking: Reported on 2/16/2024)       Current Facility-Administered Medications   Medication Dose Route Frequency Provider Last Rate Last Admin    lidocaine-EPINEPHrine 1 %-1:[id number] injection 1 mL  1 mL IntraDERmal Once John Blanc PA         Allergies:  Asa [aspirin], Pcn [penicillins], Rye grass flower pollen extract [gramineae pollens], and Albuterol sulfate    Social History:   Social History     Socioeconomic History    Marital status: Legally      Spouse name: Not on file    Number of children: Not on file    Years of education: Not on file    Highest education level: Not on file   Occupational History    Not on file   Tobacco Use    Smoking status: Never    Smokeless tobacco: Never   Vaping Use    Vaping Use: Never used   Substance and Sexual Activity    Alcohol use: Yes     Comment: on occasion    Drug use: No    Sexual activity: Not on file   Other Topics Concern    Not on file   Social History Narrative    Not on file     Social Determinants of Health     Financial Resource Strain: Medium Risk (2/16/2024)    Overall Financial Resource Strain (CARDIA)     Difficulty of Paying Living Expenses: Somewhat hard   Food Insecurity: No Food Insecurity (2/16/2024)    Hunger Vital Sign     Worried About Running Out of Food in the Last Year: Never true     Ran Out of Food in the Last Year: Never true   Transportation Needs: Unknown (2/16/2024)    PRAPARE - Transportation     Lack of Transportation (Medical): Not on file     Lack of Transportation (Non-Medical): No   Physical Activity: Not on file   Stress: Not on file   Social Connections: Not on file   Intimate Partner Violence: Not on file   Housing Stability: Unknown (2/16/2024)    Housing Stability Vital Sign     Unable to Pay for Housing in the Last Year: Not on file     Number of Places Lived in the Last Year: Not on file     Unstable Housing in the Last Year: No

## 2024-04-03 LAB — SURGICAL PATHOLOGY REPORT: NORMAL

## 2024-04-19 ENCOUNTER — OFFICE VISIT (OUTPATIENT)
Dept: SURGERY | Age: 59
End: 2024-04-19
Payer: MEDICAID

## 2024-04-19 ENCOUNTER — TELEPHONE (OUTPATIENT)
Dept: SURGERY | Age: 59
End: 2024-04-19

## 2024-04-19 VITALS — TEMPERATURE: 97.7 F

## 2024-04-19 DIAGNOSIS — C44.311 BASAL CELL CARCINOMA (BCC) OF LATERAL SIDE WALL OF NOSE: Primary | ICD-10-CM

## 2024-04-19 PROCEDURE — 99213 OFFICE O/P EST LOW 20 MIN: CPT | Performed by: PLASTIC SURGERY

## 2024-04-19 PROCEDURE — G8427 DOCREV CUR MEDS BY ELIG CLIN: HCPCS | Performed by: PLASTIC SURGERY

## 2024-04-19 PROCEDURE — 1036F TOBACCO NON-USER: CPT | Performed by: PLASTIC SURGERY

## 2024-04-19 PROCEDURE — G8419 CALC BMI OUT NRM PARAM NOF/U: HCPCS | Performed by: PLASTIC SURGERY

## 2024-04-19 PROCEDURE — 3017F COLORECTAL CA SCREEN DOC REV: CPT | Performed by: PLASTIC SURGERY

## 2024-04-19 NOTE — TELEPHONE ENCOUNTER
Need medical clearance       Outpatient procedure excision of basal cell carcinoma of right nose with rotational flap closure  Surgery has been scheduled at [address] on 5/14/2024, Pre-Admission Testing will call you prior to surgery to inform you arrival time and any other additional directions,if they are unable to reach you,please call them two days prior at [phone].  If taking Fish Oil, Vitamins, two weeks prior to surgery stop taking. If taking NSAIDS (such as Aspirin, Ibuprofen) anticoagulants please consult with your prescribing physician to get further instructions on when to stop medication prior to surgery that is scheduled, patient understood.    Pre-Auth #:[id number] cpt [id number]  CPT Codes: 42596,63946  ICD 10:c44.311         <<-----Click here for Discharge Medication Review

## 2024-04-19 NOTE — PROGRESS NOTES
Subjective:    Follow up today from shave biopsy right lateral nasal dorsum lesion of uncertain behavior. Denies fever, nausea, vomiting, leg pain or swelling, pain is absent.  The pt states that they have kept the wound site(s) covered with bacitracin.    They voice no complaints.     Objective:    Temp 97.7 °F (36.5 °C) (Infrared)       Right nasal dorsum biopsy wound: Clean, dry, and intact, no drainage.  No signs of infection, wound healing well        Assessment:    Patient Active Problem List   Diagnosis    Pulmonary nodules    History of cholecystectomy    Actinic keratosis    Lesion of skin of nose    GI bleed    Colitis    Rectal bleeding    Left lower quadrant abdominal pain    Ischemic colitis (HCC)    Grade II hemorrhoids    Skin tag    Need for shingles vaccine    Cellulitis    Neuropathy       Labs: CBC:   Lab Results   Component Value Date/Time    WBC 9.8 11/05/2022 04:44 AM    RBC 4.62 11/05/2022 04:44 AM    HGB 13.6 11/05/2022 04:44 AM    HCT 41.7 11/05/2022 04:44 AM    MCV 90.3 11/05/2022 04:44 AM    MCH 29.4 11/05/2022 04:44 AM    MCHC 32.6 11/05/2022 04:44 AM    RDW 13.2 11/05/2022 04:44 AM     11/05/2022 04:44 AM    MPV 11.4 11/05/2022 04:44 AM     BMP:    Lab Results   Component Value Date/Time     11/05/2022 04:44 AM    K 4.1 11/05/2022 04:44 AM     11/05/2022 04:44 AM    CO2 23 11/05/2022 04:44 AM    BUN 13 11/05/2022 04:44 AM    CREATININE 0.9 11/05/2022 04:44 AM    CALCIUM 9.4 11/05/2022 04:44 AM    GFRAA >60 11/30/2021 01:23 PM    LABGLOM >60 11/05/2022 04:44 AM    GLUCOSE 83 11/05/2022 04:44 AM         Pathology Report- Path Number: [id number]     -- Diagnosis --   Right nose, shave excision: Fragments of basal cell carcinoma   involving surgical margin         Plan:       -) Excisional biopsy of basal cell carcinoma right lateral nose with local tissue rearrangement possible FTSG    Educated the pt on their pathology report.  Pt voices understanding      -The patient

## 2024-04-26 ENCOUNTER — OFFICE VISIT (OUTPATIENT)
Dept: INTERNAL MEDICINE | Age: 59
End: 2024-04-26
Payer: MEDICAID

## 2024-04-26 VITALS
HEIGHT: 74 IN | OXYGEN SATURATION: 96 % | HEART RATE: 57 BPM | RESPIRATION RATE: 18 BRPM | WEIGHT: 205.6 LBS | SYSTOLIC BLOOD PRESSURE: 112 MMHG | DIASTOLIC BLOOD PRESSURE: 77 MMHG | TEMPERATURE: 97.5 F | BODY MASS INDEX: 26.39 KG/M2

## 2024-04-26 DIAGNOSIS — L98.9 EXTERNAL NASAL LESION: ICD-10-CM

## 2024-04-26 DIAGNOSIS — Z01.818 PRE-OP EVALUATION: Primary | ICD-10-CM

## 2024-04-26 PROCEDURE — 99213 OFFICE O/P EST LOW 20 MIN: CPT

## 2024-04-26 PROCEDURE — G8419 CALC BMI OUT NRM PARAM NOF/U: HCPCS

## 2024-04-26 PROCEDURE — 99212 OFFICE O/P EST SF 10 MIN: CPT

## 2024-04-26 PROCEDURE — G8427 DOCREV CUR MEDS BY ELIG CLIN: HCPCS

## 2024-04-26 ASSESSMENT — ENCOUNTER SYMPTOMS
RHINORRHEA: 0
COUGH: 0
BACK PAIN: 0
NAUSEA: 0
CONSTIPATION: 0
SHORTNESS OF BREATH: 0
DIARRHEA: 0
VOMITING: 0
ABDOMINAL PAIN: 0
CHOKING: 0
SORE THROAT: 0
ABDOMINAL DISTENTION: 0
BLOOD IN STOOL: 0
WHEEZING: 0
CHEST TIGHTNESS: 0

## 2024-04-26 NOTE — PATIENT INSTRUCTIONS
Continue your medications as listed    Call for a sooner appointment if you have additional concerns.        Burak Centeno MD  Internal Medicine

## 2024-04-26 NOTE — PROGRESS NOTES
Called Dr. Ervin's  ofice message left informed seen in our office today for pre-op check list and notes can be found in EPIC if further information needed please call our office

## 2024-04-26 NOTE — PROGRESS NOTES
Bernabe Mcallister (:  1965) is a 59 y.o. male,Established patient, here for evaluation of the following chief complaint(s):  Pre-op Exam (Having surgery to right lateral dorsum of nose for removal of skin cancer , no active drainage noted)      Assessment & Plan   1. Pre-op evaluation  2. External nasal lesion      Return in about 3 months (around 2024) for pcp follow up.       Subjective   HPI    Patient presents for preoperative evaluation    External nasal region, shave excision showing fragments of basal cell  Patient followed up with plastic surgery, he is for excisional biopsy of basal cell carcinoma with local tissue rearrangement possible FTSG in 1 month    Pre-Operative Risk assessment using 2014 ACC/AHA guidelines     Emergent procedure No  Active Cardiac Condition No (decompensated HF, Arrhythmia, MI <3 weeks, severe valve disease)  Risk Level of Procedure Low Risk (endoscopy, superficial skin, breast, ambulatory, or cataract, etc.)  Revised Cardiac Risk Index Risk factors: History of heart failure  Measurement of Exercise Tolerance before Surgery >4 Yes    According to the 2014 ACC/AHA pre-operative risk assessment guidelines Bernabe Mcallister is a low risk for major cardiac complications during a low risk procedure and may continue as planned. Specific medication recommendations are listed below. Medications recommended to continue should be taken with a sip of water even when NPO.     Further recommendations from consultants: None    Patient is not on any antiplatelet or anticoagulant.        Review of Systems   Constitutional:  Negative for activity change, appetite change, chills, diaphoresis, fatigue, fever and unexpected weight change.   HENT:  Negative for postnasal drip, rhinorrhea, sneezing and sore throat.    Respiratory:  Negative for cough, choking, chest tightness, shortness of breath and wheezing.    Cardiovascular:  Negative for chest pain, palpitations and leg swelling.  Neurologic Chief Complaint:   Left hand tingling, dysarthria, left calf tingling and weakness, right eye blurred vision    Subjective:     Interval History: Patient is seen for follow-up neurological assessment and treatment recommendations: no acute events, remains at his baseline.  Started dual antiplatelet and tolerating.    HPI, Past Medical, Family, and Social History remains the same as documented in the initial encounter.     Review of Systems   Constitutional:  Negative for fatigue and fever.   HENT:  Negative for hearing loss, tinnitus, trouble swallowing and voice change.    Eyes:  Negative for pain and visual disturbance.   Respiratory:  Negative for cough, chest tightness and shortness of breath.    Cardiovascular:  Negative for chest pain and palpitations.   Gastrointestinal:  Negative for abdominal pain, nausea and vomiting.   Endocrine: Negative.    Genitourinary: Negative.    Musculoskeletal: Negative.    Skin:  Negative for rash.   Neurological:  Negative for dizziness, facial asymmetry, speech difficulty, weakness, light-headedness, numbness and headaches.   Psychiatric/Behavioral: Negative.     Scheduled Meds:   aspirin  81 mg Oral Daily    atorvastatin  40 mg Oral Daily    clopidogreL  75 mg Oral Daily     Continuous Infusions:   sodium chloride 0.9%       PRN Meds:acetaminophen, labetaloL, sodium chloride 0.9%, sodium chloride 0.9%    Objective:     Vital Signs (Most Recent):  Temp: 97.4 °F (36.3 °C) (11/16/22 0900)  Pulse: 65 (11/16/22 0900)  Resp: 16 (11/16/22 0829)  BP: 122/80 (11/16/22 0900)  SpO2: 96 % (11/16/22 0900)  BP Location: Left arm    Vital Signs Range (Last 24H):  Temp:  [96.6 °F (35.9 °C)-99.1 °F (37.3 °C)]   Pulse:  [58-82]   Resp:  [11-20]   BP: (111-169)/(77-98)   SpO2:  [94 %-99 %]   BP Location: Left arm    Physical Exam  Constitutional:       Appearance: Normal appearance.   HENT:      Head: Normocephalic and atraumatic.      Nose: Nose normal.      Mouth/Throat:       Mouth: Mucous membranes are moist.      Pharynx: Oropharynx is clear.   Eyes:      General: No visual field deficit.     Extraocular Movements: Extraocular movements intact.      Pupils: Pupils are equal, round, and reactive to light.   Cardiovascular:      Rate and Rhythm: Normal rate and regular rhythm.      Pulses: Normal pulses.   Pulmonary:      Effort: Pulmonary effort is normal. No respiratory distress.   Abdominal:      Palpations: Abdomen is soft.   Musculoskeletal:         General: Normal range of motion.      Cervical back: Normal range of motion.   Skin:     General: Skin is warm and dry.   Neurological:      General: No focal deficit present.      Mental Status: He is alert and oriented to person, place, and time. Mental status is at baseline.      GCS: GCS eye subscore is 4. GCS verbal subscore is 5. GCS motor subscore is 6.      Cranial Nerves: No cranial nerve deficit, dysarthria or facial asymmetry.      Sensory: No sensory deficit.      Motor: No weakness, abnormal muscle tone or pronator drift.      Coordination: Coordination normal. Finger-Nose-Finger Test and Heel to Shin Test normal.      Gait: Gait normal.      Deep Tendon Reflexes: Reflexes are normal and symmetric.      Reflex Scores:       Tricep reflexes are 2+ on the right side and 2+ on the left side.       Bicep reflexes are 2+ on the right side and 2+ on the left side.       Brachioradialis reflexes are 2+ on the right side and 2+ on the left side.       Patellar reflexes are 2+ on the right side and 2+ on the left side.       Achilles reflexes are 2+ on the right side and 2+ on the left side.  Psychiatric:         Mood and Affect: Mood normal.     Neurological Exam:   LOC: alert  Attention Span: Good   Language: No aphasia  Articulation: No dysarthria  Orientation: Person, Place, Time   Visual Fields: Full  EOM (CN III, IV, VI): Full/intact  Pupils (CN II, III): PERRL  Facial Sensation (CN V): Normal  Facial Movement (CN VII):  Symmetric facial expression    Reflexes: 2+ throughout  Motor: 5/5 throughout  Cerebellum: No evidence of appendicular or axial ataxia  Sensation: Intact to light touch, temperature and vibration  Tone: Normal tone throughout    Laboratory:  CMP:   Recent Labs   Lab 11/16/22  0403   CALCIUM 9.9   ALBUMIN 4.2   PROT 7.0      K 3.8   CO2 27      BUN 13   CREATININE 1.1   ALKPHOS 62   ALT 86*   AST 35   BILITOT 0.6     CBC:   Recent Labs   Lab 11/16/22  0403   WBC 6.49   RBC 4.48*   HGB 13.8*   HCT 40.2      MCV 90   MCH 30.8   MCHC 34.3     Lipid Panel:   Recent Labs   Lab 11/15/22  1524   CHOL 166   LDLCALC 99.0   HDL 48   TRIG 95     Coagulation:   Recent Labs   Lab 11/16/22  0403   INR 1.1   APTT 24.9     Hgb A1C:   Recent Labs   Lab 11/15/22  1524   HGBA1C 5.5     TSH:   Recent Labs   Lab 11/15/22  1524   TSH 1.598     Brain and vessel Imaging   Impression:   No intracranial abnormality.    Cardiac imaging  The left ventricle is normal in size with normal systolic function. The estimated ejection fraction is 60%.  Normal right ventricular size with normal right ventricular systolic function.  Normal left ventricular diastolic function.  The estimated PA systolic pressure is 22 mmHg.  Normal central venous pressure (3 mmHg).  The bubble study is equivocal for intracardiac shunting. There is, however, evidence of substantial extracardiac shunting with copious bubbles appearing in the left heart 5+ cardiac cycles after reaching the right heart.

## 2024-04-26 NOTE — PROGRESS NOTES
[facility]  Internal Medicine Residency Clinic  Attending Physician Statement:  Cristino Campos M.D., F.A.C.P.  Patient is seen for fu visit today.  -- acute and chronic problems addressed  Addressed when applicable- Health maintenance issues of vaccinations, social determinants/depression/CA screening, tobacco cessation etc...  I have discussed the case, including pertinent history and exam findings with the resident, review of last visit medical records/labs-   I agree with the assessment, plan and orders as documented by the resident.    -Billiing assessed by medical complexity of case  My assessment of high points of todays visit as follows:    For plastic full thickness graft surgery  Plan to remove basal cell CA off nose    Hx of ischemic colitis as  adult, prior abd surgeries as kid noted  No SHERRIE  Not on anticougulants  Low cardiac risk, METS >4, sinus bradycardia- asympomatic  \"Clearance\"- pulm, HTN, BS OK  Ok for surgery

## 2024-05-01 ENCOUNTER — PREP FOR PROCEDURE (OUTPATIENT)
Dept: SURGERY | Age: 59
End: 2024-05-01

## 2024-05-01 DIAGNOSIS — C44.311 BASAL CELL CARCINOMA OF NOSE: ICD-10-CM

## 2024-05-01 NOTE — H&P
Department of Plastic Surgery - Adult  Attending Consult Note      CHIEF COMPLAINT:   skin cancer    History Obtained From:  patient    HISTORY OF PRESENT ILLNESS:                The patient is a 59 y.o. male who presents for  Follow up today from shave biopsy right lateral nasal dorsum lesion of uncertain behavior. Denies fever, nausea, vomiting, leg pain or swelling, pain is absent.  The pt states that they have kept the wound site(s) covered with bacitracin.    They voice no complaints.        Past Medical History:    No past medical history on file.  Past Surgical History:    Past Surgical History:   Procedure Laterality Date    ABDOMEN SURGERY      3 DAYS OLD    ABDOMINAL ADHESION SURGERY      APPENDECTOMY  1970    CHOLECYSTECTOMY, LAPAROSCOPIC N/A 1/25/2019    LAPAROSCOPIC CHOLECYSTECTOMY performed by Neo Andres MD at [facility] OR    COLONOSCOPY N/A 9/24/2021    COLONOSCOPY WITH BIOPSY performed by Yasmin Rdz MD at [facility] ENDOSCOPY     Current Medications:   No current facility-administered medications for this encounter.     Current Outpatient Medications   Medication Sig Dispense Refill    loperamide (IMODIUM) 2 MG capsule Take 1 capsule by mouth 4 times daily as needed for Diarrhea Prn per pt takes rosalba anti-diarrheal      atorvastatin (LIPITOR) 10 MG tablet Take 1 tablet by mouth daily 90 tablet 1    famotidine (PEPCID) 20 MG tablet Take 1 tablet by mouth daily as needed (As needed for abdominal discomfort) 60 tablet 3    gabapentin (NEURONTIN) 100 MG capsule Take 1 capsule by mouth nightly for 30 days. Intended supply: 30 days (Patient not taking: Reported on 2/16/2024) 30 capsule 0    diphenhydrAMINE (BENADRYL) 25 MG tablet Take 1 tablet by mouth every 6 hours as needed for Allergies      ibuprofen (ADVIL;MOTRIN) 200 MG tablet Take 2 tablets by mouth every 6 hours as needed for Pain         Allergies:  Asa [aspirin], Pcn [penicillins], Rye grass flower pollen extract [gramineae pollens], and  basal cell carcinoma right lateral nose with local tissue rearrangement possible FTSG     Educated the pt on their pathology report.  Pt voices understanding        -The patient was counseled on their pathology results and the need for surgical   intervention.   -We will plan to proceed and have the lesion formely excised with margins in the OR.  -The patient will  require frozen sections in the operating room  -The patient will  require pre-op clearance from their PCP.        Surgical plan: Excision of basal cell carcinoma right lateral nose with local tissue rearrangement possible full-thickness skin graft    Attestation    No change in patient's H & P. I have reviewed the procedure with the patient as well as the risk and benefits. They have no further questions and agree to proceed with surgery.    Valentino Ervin MD   7:28 AM  5/14/2024

## 2024-05-08 NOTE — PROGRESS NOTES
[facility]   PRE-ADMISSION TESTING GENERAL INSTRUCTIONS  PAT Phone Number: [phone]      GENERAL INSTRUCTIONS:    [x] Antibacterial Soap Shower Night before and/or AM of surgery.  [] CHG Wipes instruction sheet and wipes given.  [x] Do not wear makeup, lotions, powders, deodorant the morning of surgery.  [x] Nothing to eat or drink after midnight. This includes no gum, candy, mints or water.  [x] You may brush your teeth, gargle, but do not swallow water.   [x] No tobacco products, illegal drugs, or alcohol within 24 hours of your surgery.  [x] Jewelry or valuables should not be brought to the hospital. All body and/or tongue piercing's must be removed prior to arriving to hospital. No contact lens or hair pins.   [x] Arrange transportation with a responsible adult  to and from the hospital. Arrange for someone to be with you for the remainder of the day and for 24 hours after your procedure due to having had anesthesia.          -Who will be your  for transportation? friend        -Who will be staying with you for 24 hrs after your procedure? friend  [x] Bring insurance card and photo ID.  [] Bring copy of living will or healthcare power of  papers to be placed in your electronic record.  [] Urine Pregnancy test will be preformed the day of surgery. A specimen sample may be brought from home.  [] Transfusion (Green) Bracelet: Please bring with you to hospital, day of surgery.     PARKING INSTRUCTIONS:     [x] ARRIVAL DATE & TIME:  5/14  @  7535  [x] Times are subject to change. We will contact you the business day before surgery if that were to occur.  [x] Enter into the [facility] Entrance. Two people may accompany you. Masks are not required.  [x] Parking Lot \"I\" is where you will park. It is located on the corner of [facility] and [facility]. The entrance is on [address].   Only one vehicle - per patient, is permitted in parking lot.   Upon  as you wait for your nurse.   [x] Delays may occur with surgery and staff will make a sincere effort to keep you informed of delays. If any delays occur with your procedure, we apologize ahead of time for your inconvenience as we recognize the value of your time.

## 2024-05-13 ENCOUNTER — ANESTHESIA EVENT (OUTPATIENT)
Dept: OPERATING ROOM | Age: 59
End: 2024-05-13
Payer: MEDICAID

## 2024-05-13 NOTE — PROGRESS NOTES
Patient notified of time change for procedure. New arrival time is now 0645. Patient verbalized understanding.

## 2024-05-14 ENCOUNTER — HOSPITAL ENCOUNTER (OUTPATIENT)
Age: 59
Setting detail: OUTPATIENT SURGERY
Discharge: HOME | End: 2024-05-14
Attending: PLASTIC SURGERY | Admitting: PLASTIC SURGERY
Payer: MEDICAID

## 2024-05-14 ENCOUNTER — TELEPHONE (OUTPATIENT)
Dept: SURGERY | Age: 59
End: 2024-05-14

## 2024-05-14 ENCOUNTER — ANESTHESIA (OUTPATIENT)
Dept: OPERATING ROOM | Age: 59
End: 2024-05-14
Payer: MEDICAID

## 2024-05-14 VITALS
WEIGHT: 208 LBS | HEART RATE: 60 BPM | TEMPERATURE: 98 F | BODY MASS INDEX: 26.69 KG/M2 | SYSTOLIC BLOOD PRESSURE: 110 MMHG | RESPIRATION RATE: 18 BRPM | DIASTOLIC BLOOD PRESSURE: 68 MMHG | OXYGEN SATURATION: 98 % | HEIGHT: 74 IN

## 2024-05-14 DIAGNOSIS — G89.18 POST-OP PAIN: Primary | ICD-10-CM

## 2024-05-14 DIAGNOSIS — C44.311 BASAL CELL CARCINOMA OF NOSE: ICD-10-CM

## 2024-05-14 PROCEDURE — 2709999900 HC NON-CHARGEABLE SUPPLY: Performed by: PLASTIC SURGERY

## 2024-05-14 PROCEDURE — 7100000011 HC PHASE II RECOVERY - ADDTL 15 MIN: Performed by: PLASTIC SURGERY

## 2024-05-14 PROCEDURE — 14060 TIS TRNFR E/N/E/L 10 SQ CM/<: CPT | Performed by: PLASTIC SURGERY

## 2024-05-14 PROCEDURE — 88305 TISSUE EXAM BY PATHOLOGIST: CPT

## 2024-05-14 PROCEDURE — 6360000002 HC RX W HCPCS

## 2024-05-14 PROCEDURE — 7100000001 HC PACU RECOVERY - ADDTL 15 MIN: Performed by: PLASTIC SURGERY

## 2024-05-14 PROCEDURE — 3600000002 HC SURGERY LEVEL 2 BASE: Performed by: PLASTIC SURGERY

## 2024-05-14 PROCEDURE — 88331 PATH CONSLTJ SURG 1 BLK 1SPC: CPT

## 2024-05-14 PROCEDURE — 6370000000 HC RX 637 (ALT 250 FOR IP): Performed by: PLASTIC SURGERY

## 2024-05-14 PROCEDURE — 2500000003 HC RX 250 WO HCPCS: Performed by: PLASTIC SURGERY

## 2024-05-14 PROCEDURE — 2580000003 HC RX 258: Performed by: PLASTIC SURGERY

## 2024-05-14 PROCEDURE — 7100000000 HC PACU RECOVERY - FIRST 15 MIN: Performed by: PLASTIC SURGERY

## 2024-05-14 PROCEDURE — 2580000003 HC RX 258

## 2024-05-14 PROCEDURE — 6360000002 HC RX W HCPCS: Performed by: PLASTIC SURGERY

## 2024-05-14 PROCEDURE — 11642 EXC F/E/E/N/L MAL+MRG 1.1-2: CPT | Performed by: PLASTIC SURGERY

## 2024-05-14 PROCEDURE — 3600000012 HC SURGERY LEVEL 2 ADDTL 15MIN: Performed by: PLASTIC SURGERY

## 2024-05-14 PROCEDURE — NBSRV NON-BILLABLE SERVICE: Performed by: PHYSICIAN ASSISTANT

## 2024-05-14 PROCEDURE — 7100000010 HC PHASE II RECOVERY - FIRST 15 MIN: Performed by: PLASTIC SURGERY

## 2024-05-14 PROCEDURE — 3700000001 HC ADD 15 MINUTES (ANESTHESIA): Performed by: PLASTIC SURGERY

## 2024-05-14 PROCEDURE — 3700000000 HC ANESTHESIA ATTENDED CARE: Performed by: PLASTIC SURGERY

## 2024-05-14 RX ORDER — SODIUM CHLORIDE 0.9 % (FLUSH) 0.9 %
5-40 SYRINGE (ML) INJECTION EVERY 12 HOURS SCHEDULED
Status: DISCONTINUED | OUTPATIENT
Start: 2024-05-14 | End: 2024-05-14 | Stop reason: HOSPADM

## 2024-05-14 RX ORDER — LIDOCAINE HYDROCHLORIDE AND EPINEPHRINE 10; 10 MG/ML; UG/ML
INJECTION, SOLUTION INFILTRATION; PERINEURAL PRN
Status: DISCONTINUED | OUTPATIENT
Start: 2024-05-14 | End: 2024-05-14 | Stop reason: ALTCHOICE

## 2024-05-14 RX ORDER — FENTANYL CITRATE 50 UG/ML
INJECTION, SOLUTION INTRAMUSCULAR; INTRAVENOUS PRN
Status: DISCONTINUED | OUTPATIENT
Start: 2024-05-14 | End: 2024-05-14 | Stop reason: SDUPTHER

## 2024-05-14 RX ORDER — OXYCODONE HYDROCHLORIDE 5 MG/1
5 TABLET ORAL PRN
Status: DISCONTINUED | OUTPATIENT
Start: 2024-05-14 | End: 2024-05-14 | Stop reason: HOSPADM

## 2024-05-14 RX ORDER — LIDOCAINE HYDROCHLORIDE 20 MG/ML
INJECTION, SOLUTION INTRAVENOUS PRN
Status: DISCONTINUED | OUTPATIENT
Start: 2024-05-14 | End: 2024-05-14 | Stop reason: SDUPTHER

## 2024-05-14 RX ORDER — SODIUM CHLORIDE 0.9 % (FLUSH) 0.9 %
5-40 SYRINGE (ML) INJECTION PRN
Status: DISCONTINUED | OUTPATIENT
Start: 2024-05-14 | End: 2024-05-14 | Stop reason: HOSPADM

## 2024-05-14 RX ORDER — SODIUM CHLORIDE 9 MG/ML
INJECTION, SOLUTION INTRAVENOUS PRN
Status: DISCONTINUED | OUTPATIENT
Start: 2024-05-14 | End: 2024-05-14 | Stop reason: HOSPADM

## 2024-05-14 RX ORDER — MIDAZOLAM HYDROCHLORIDE 1 MG/ML
INJECTION INTRAMUSCULAR; INTRAVENOUS PRN
Status: DISCONTINUED | OUTPATIENT
Start: 2024-05-14 | End: 2024-05-14 | Stop reason: SDUPTHER

## 2024-05-14 RX ORDER — GINSENG 100 MG
CAPSULE ORAL
Qty: 14 G | Refills: 1 | Status: SHIPPED | OUTPATIENT
Start: 2024-05-14

## 2024-05-14 RX ORDER — NALOXONE HYDROCHLORIDE 0.4 MG/ML
INJECTION, SOLUTION INTRAMUSCULAR; INTRAVENOUS; SUBCUTANEOUS PRN
Status: DISCONTINUED | OUTPATIENT
Start: 2024-05-14 | End: 2024-05-14 | Stop reason: HOSPADM

## 2024-05-14 RX ORDER — PROPOFOL 10 MG/ML
INJECTION, EMULSION INTRAVENOUS PRN
Status: DISCONTINUED | OUTPATIENT
Start: 2024-05-14 | End: 2024-05-14 | Stop reason: SDUPTHER

## 2024-05-14 RX ORDER — OXYCODONE HYDROCHLORIDE 5 MG/1
10 TABLET ORAL PRN
Status: DISCONTINUED | OUTPATIENT
Start: 2024-05-14 | End: 2024-05-14 | Stop reason: HOSPADM

## 2024-05-14 RX ORDER — SODIUM CHLORIDE 9 MG/ML
INJECTION, SOLUTION INTRAVENOUS CONTINUOUS
Status: DISCONTINUED | OUTPATIENT
Start: 2024-05-14 | End: 2024-05-14 | Stop reason: HOSPADM

## 2024-05-14 RX ORDER — BACITRACIN 500 [USP'U]/G
OINTMENT OPHTHALMIC PRN
Status: DISCONTINUED | OUTPATIENT
Start: 2024-05-14 | End: 2024-05-14 | Stop reason: ALTCHOICE

## 2024-05-14 RX ORDER — SODIUM CHLORIDE 9 MG/ML
INJECTION, SOLUTION INTRAVENOUS CONTINUOUS PRN
Status: DISCONTINUED | OUTPATIENT
Start: 2024-05-14 | End: 2024-05-14 | Stop reason: SDUPTHER

## 2024-05-14 RX ORDER — HYDROCODONE BITARTRATE AND ACETAMINOPHEN 5; 325 MG/1; MG/1
1 TABLET ORAL EVERY 4 HOURS PRN
Qty: 10 TABLET | Refills: 0 | Status: SHIPPED | OUTPATIENT
Start: 2024-05-14 | End: 2024-05-21

## 2024-05-14 RX ORDER — CLINDAMYCIN HYDROCHLORIDE 300 MG/1
300 CAPSULE ORAL 3 TIMES DAILY
Qty: 15 CAPSULE | Refills: 0 | Status: SHIPPED | OUTPATIENT
Start: 2024-05-14 | End: 2024-05-19

## 2024-05-14 RX ADMIN — SODIUM CHLORIDE: 9 INJECTION, SOLUTION INTRAVENOUS at 06:43

## 2024-05-14 RX ADMIN — CEFAZOLIN 2000 MG: 2 INJECTION, POWDER, FOR SOLUTION INTRAMUSCULAR; INTRAVENOUS at 09:55

## 2024-05-14 RX ADMIN — LIDOCAINE HYDROCHLORIDE 20 MG: 20 INJECTION, SOLUTION INTRAVENOUS at 09:52

## 2024-05-14 RX ADMIN — PROPOFOL 30 MG: 10 INJECTION, EMULSION INTRAVENOUS at 09:52

## 2024-05-14 RX ADMIN — PROPOFOL 30 MG: 10 INJECTION, EMULSION INTRAVENOUS at 09:54

## 2024-05-14 RX ADMIN — FENTANYL CITRATE 25 MCG: 50 INJECTION, SOLUTION INTRAMUSCULAR; INTRAVENOUS at 10:27

## 2024-05-14 RX ADMIN — MIDAZOLAM 2 MG: 1 INJECTION INTRAMUSCULAR; INTRAVENOUS at 09:50

## 2024-05-14 RX ADMIN — FENTANYL CITRATE 50 MCG: 50 INJECTION, SOLUTION INTRAMUSCULAR; INTRAVENOUS at 10:03

## 2024-05-14 RX ADMIN — FENTANYL CITRATE 25 MCG: 50 INJECTION, SOLUTION INTRAMUSCULAR; INTRAVENOUS at 09:52

## 2024-05-14 RX ADMIN — SODIUM CHLORIDE: 9 INJECTION, SOLUTION INTRAVENOUS at 09:49

## 2024-05-14 RX ADMIN — PROPOFOL 80 MCG/KG/MIN: 10 INJECTION, EMULSION INTRAVENOUS at 09:53

## 2024-05-14 ASSESSMENT — PAIN SCALES - GENERAL
PAINLEVEL_OUTOF10: 0
PAINLEVEL_OUTOF10: 0

## 2024-05-14 ASSESSMENT — PAIN - FUNCTIONAL ASSESSMENT: PAIN_FUNCTIONAL_ASSESSMENT: 0-10

## 2024-05-14 NOTE — TELEPHONE ENCOUNTER
Patient wanted to know if he had to be off from work until his follow up on 5/24. He wanted to go back sooner if he was allowed to.

## 2024-05-14 NOTE — DISCHARGE INSTRUCTIONS
Discharge Home.   Call office to schedule a follow-up appointment at [phone]  Please call to schedule an appointment to be seen In our office on Friday 5-24-24  Diet: regular diet  Activity: ambulate in house and no Strenuous exercise for 1 week, no work until seen in office for follow up.  Shower/Bathing:  You can shower in 48 hours over the incision site.  At that time you can allow soap and water to rinse off the incision site while showering.  Once you are done in the shower you can pat dry the incision site with a clean paper towel.  No baths, hot tubs or soaking of the wound site at this time.   Dressings /Splint /Wound Care:Keep wound clean and dry.  Apply bacitracin  to the wound site two times daily.  Driving: It is OK to drive if the following criteria are met:   1- Not taking narcotic pain medication   2- Not distracted by pain or limited ROM   3- Not a hazard to self or others on the road  Medications: As prescribed.  Educated to contact physician at [phone] with concerns or signs of infection (redness, increasing pain, discharge, odor, fever).

## 2024-05-14 NOTE — ANESTHESIA POSTPROCEDURE EVALUATION
Department of Anesthesiology  Postprocedure Note    Patient: Bernabe Mcallister  MRN: [id number]  YOB: 1965  Date of evaluation: 5/14/2024    Procedure Summary       Date: 05/14/24 Room / Location: [address]    Anesthesia Start: 0949 Anesthesia Stop: 1050    Procedure: excision of basal cell carcinoma of right nasal dorsum with rhomboid closure (Face) Diagnosis:       Basal cell carcinoma of nose      (Basal cell carcinoma of nose [C44.311])    Surgeons: Valentino Ervin MD Responsible Provider: Romina Schultz DO    Anesthesia Type: MAC ASA Status: 3            Anesthesia Type: MAC    Debby Phase I: Debby Score: 10    Debby Phase II:      Anesthesia Post Evaluation    Patient location during evaluation: PACU  Patient participation: complete - patient participated  Level of consciousness: awake and alert  Airway patency: patent  Nausea & Vomiting: no nausea and no vomiting  Cardiovascular status: hemodynamically stable  Respiratory status: acceptable  Hydration status: euvolemic  Pain management: adequate    No notable events documented.

## 2024-05-14 NOTE — OP NOTE
[facility]              [address] [place] [address]                            OPERATIVE REPORT      PATIENT NAME: HECTOR MCCONNELL           : 1965  MED REC NO: [id number]                        ROOM: [facility]  ACCOUNT NO: [id number]                       ADMIT DATE: 2024  PROVIDER: Valentino Ervin MD      DATE OF PROCEDURE:  2024    SURGEON:  Valentino Ervin MD    PREOPERATIVE DIAGNOSIS:  Basal cell carcinoma, right nasal dorsum.    POSTOPERATIVE DIAGNOSIS:  Basal cell carcinoma, right nasal dorsum.    PROCEDURE:  Excision of right nasal dorsum basal cell carcinoma with rhomboid flap closure, lesion measured 16 x 16 mm, margins were 2 mm, defect measured 20 x 20 mm, flap measured 20 x 20 mm.    ASSISTANTS:  LINDSEY De La Torre; and resident, Nathan Valladares DO.    ANESTHESIA:  Monitored anesthesia care.    ESTIMATED BLOOD LOSS:  2 mL.    IV FLUIDS:  Given 700 mL of crystalloid.    COMPLICATIONS:  None.    FINDINGS:  Basal cell carcinoma of the right nasal dorsum.    INDICATIONS FOR PROCEDURE:  This is a 59-year-old male with biopsy-proven basal cell carcinoma of the right nasal dorsum.  Recommendation was for wide local excision with primary closure versus rotational flap versus full-thickness skin graft.  Risks, benefits, and alternatives were thoroughly discussed with the patient with risks including, but not limited to, risk of bleeding, infection, scarring, damage to surrounding structures, fluid collection, asymmetry, and need for further procedures.  He verbalized understanding and agrees to proceed.    DETAILED DESCRIPTION OF PROCEDURE:  The patient was identified preoperatively, brought back to the operating room, left supine on the bed.  SCDs were on and functioning.  Preoperative antibiotics were given.  A proper time-out was performed.  He was turned over to Anesthesia for proper induction via monitored anesthesia

## 2024-05-14 NOTE — PROGRESS NOTES
INTRAOPERATIVE CONSULTATION (with FROZEN SECTION)  Skin of nose: basal cell carcinoma, margins are free.

## 2024-05-14 NOTE — ANESTHESIA PRE PROCEDURE
Department of Anesthesiology  Preprocedure Note       Name:  Bernabe Mcallister   Age:  59 y.o.  :  1965                                          MRN:  [id number]         Date:  2024      Surgeon: Surgeon(s):  Valentino Ervin MD    Procedure: Procedure(s):  excision of basal cell carcinoma of right nose with rotational flap closure (FROZEN)    Medications prior to admission:   Prior to Admission medications    Medication Sig Start Date End Date Taking? Authorizing Provider   loperamide (IMODIUM) 2 MG capsule Take 1 capsule by mouth 4 times daily as needed for Diarrhea Prn per pt takes [facility] anti-diarrheal    ProviderAnuja MD   atorvastatin (LIPITOR) 10 MG tablet Take 1 tablet by mouth daily 24   Burak Centeno MD   famotidine (PEPCID) 20 MG tablet Take 1 tablet by mouth daily as needed (As needed for abdominal discomfort) 24   Burak Centeno MD   gabapentin (NEURONTIN) 100 MG capsule Take 1 capsule by mouth nightly for 30 days. Intended supply: 30 days  Patient not taking: Reported on 2024 11/10/23 2/16/24  Lucien Walls MD   diphenhydrAMINE (BENADRYL) 25 MG tablet Take 1 tablet by mouth every 6 hours as needed for Allergies    ProviderAnuja MD   ibuprofen (ADVIL;MOTRIN) 200 MG tablet Take 2 tablets by mouth every 6 hours as needed for Pain    ProviderAnuja MD       Current medications:    Current Facility-Administered Medications   Medication Dose Route Frequency Provider Last Rate Last Admin    sodium chloride flush 0.9 % injection 5-40 mL  5-40 mL IntraVENous 2 times per day Valentino Ervin MD        sodium chloride flush 0.9 % injection 5-40 mL  5-40 mL IntraVENous PRN Valentino Ervin MD        0.9 % sodium chloride infusion   IntraVENous PRN Valentino Ervin MD        0.9 % sodium chloride infusion   IntraVENous Continuous Valentino Ervin  mL/hr at 24 0643 New Bag at 24 0643

## 2024-05-16 LAB — SURGICAL PATHOLOGY REPORT: NORMAL

## 2024-05-24 ENCOUNTER — OFFICE VISIT (OUTPATIENT)
Dept: SURGERY | Age: 59
End: 2024-05-24

## 2024-05-24 VITALS — TEMPERATURE: 98.4 F

## 2024-05-24 DIAGNOSIS — C44.311 BASAL CELL CARCINOMA OF NOSE: Primary | ICD-10-CM

## 2024-05-24 PROCEDURE — 99024 POSTOP FOLLOW-UP VISIT: CPT | Performed by: PHYSICIAN ASSISTANT

## 2024-08-02 ENCOUNTER — OFFICE VISIT (OUTPATIENT)
Dept: INTERNAL MEDICINE | Age: 59
End: 2024-08-02
Payer: MEDICAID

## 2024-08-02 VITALS
HEIGHT: 74 IN | BODY MASS INDEX: 23.93 KG/M2 | RESPIRATION RATE: 18 BRPM | HEART RATE: 63 BPM | TEMPERATURE: 97.5 F | WEIGHT: 186.5 LBS | OXYGEN SATURATION: 95 % | DIASTOLIC BLOOD PRESSURE: 69 MMHG | SYSTOLIC BLOOD PRESSURE: 100 MMHG

## 2024-08-02 DIAGNOSIS — R10.11 RIGHT UPPER QUADRANT ABDOMINAL PAIN: ICD-10-CM

## 2024-08-02 DIAGNOSIS — E78.00 HYPERCHOLESTEREMIA: ICD-10-CM

## 2024-08-02 DIAGNOSIS — M25.511 CHRONIC RIGHT SHOULDER PAIN: Primary | ICD-10-CM

## 2024-08-02 DIAGNOSIS — G89.29 CHRONIC RIGHT SHOULDER PAIN: Primary | ICD-10-CM

## 2024-08-02 PROCEDURE — 3017F COLORECTAL CA SCREEN DOC REV: CPT

## 2024-08-02 PROCEDURE — 1036F TOBACCO NON-USER: CPT

## 2024-08-02 PROCEDURE — G8427 DOCREV CUR MEDS BY ELIG CLIN: HCPCS

## 2024-08-02 PROCEDURE — G8420 CALC BMI NORM PARAMETERS: HCPCS

## 2024-08-02 PROCEDURE — 99212 OFFICE O/P EST SF 10 MIN: CPT

## 2024-08-02 PROCEDURE — 99214 OFFICE O/P EST MOD 30 MIN: CPT

## 2024-08-02 RX ORDER — FAMOTIDINE 20 MG/1
20 TABLET, FILM COATED ORAL DAILY PRN
Qty: 60 TABLET | Refills: 3 | Status: SHIPPED | OUTPATIENT
Start: 2024-08-02

## 2024-08-02 RX ORDER — TIZANIDINE 2 MG/1
2 TABLET ORAL 3 TIMES DAILY PRN
Qty: 42 TABLET | Refills: 0 | Status: SHIPPED | OUTPATIENT
Start: 2024-08-02 | End: 2024-08-16

## 2024-08-02 RX ORDER — METHYLPREDNISOLONE 4 MG/1
TABLET ORAL
Qty: 1 KIT | Refills: 0 | Status: SHIPPED | OUTPATIENT
Start: 2024-08-02 | End: 2024-08-08

## 2024-08-02 RX ORDER — ATORVASTATIN CALCIUM 10 MG/1
10 TABLET, FILM COATED ORAL DAILY
Qty: 90 TABLET | Refills: 1 | Status: SHIPPED | OUTPATIENT
Start: 2024-08-02

## 2024-08-02 NOTE — PROGRESS NOTES
.[facility] Internal Medicine      SUBJECTIVE:  Bernabe Mcallister (:  1965) is a 59 y.o. male here for evaluation of the following chief complaint(s):  Follow-up, Shoulder Pain (Pt complains of shoulder pain for about a month. Pain is constant 3/10 and gets worse with more movement. ), and Elbow Injury (Pt complains of elbow pain for about one month. Pain comes and goes and can get up to a 9/10.)      Patient came to the internal medicine clinic for follow-up visit.  Patient had a basal cell carcinoma right lateral nose excision surgery 2 months ago.  Patient does not have any active acute complaints today.    Chronic right shoulder pain.  Going on for weeks.  Medrol pack and muscle relaxer given.      Hyperlipidemia.  Patient is on atorvastatin 40 mg.  Follow-up with lipid panel before next visit.      S/p basal cell carcinoma excisional surgery  Surgery done by plastic surgery.  Patient follows up with plastic surgery.  No postsurgical complications.      Review of Systems   Constitutional:  Negative for activity change, appetite change, chills, diaphoresis, fatigue, fever and unexpected weight change.   HENT:  Negative for congestion, dental problem, drooling, ear discharge, ear pain, facial swelling, hearing loss, mouth sores, nosebleeds, postnasal drip, rhinorrhea, sinus pressure, sinus pain, sneezing, sore throat, tinnitus, trouble swallowing and voice change.    Respiratory:  Negative for apnea, cough, choking, chest tightness, shortness of breath, wheezing and stridor.    Cardiovascular:  Negative for chest pain, palpitations and leg swelling.   Gastrointestinal:  Negative for abdominal distention, abdominal pain, anal bleeding, blood in stool, constipation, diarrhea, nausea, rectal pain and vomiting.   Endocrine: Negative for cold intolerance, heat intolerance, polydipsia, polyphagia and polyuria.   Genitourinary:  Negative for difficulty urinating, dysuria,

## 2024-08-02 NOTE — PROGRESS NOTES
[facility]  Internal Medicine Residency Clinic    Attending Physician Statement  I have discussed the case, including pertinent history and exam findings with the resident physician.I have seen and examined the patient and the key elements of the encounter have been performed by me. I agree with the assessment, plan and orders as documented by the resident. I have reviewed the relevant PMHx, PSHx, FamHx, SocialHx, medications, and allergies and updated history as appropriate.    Patient presents for routine follow up of medical problems.    Right shoulder pain -- subluxation worse after recent overactivity -- ok for medrol / muscle relaxer and if not improved recommend PT    GERD  -- continues on pepcid prn    HLD -- , recheck FLP, continue statin    BCC nose s/p excision -- path reviewed and without perineural involvement    Remainder of medical problems as per resident note.    Cornelius Suresh,   8/2/2024 3:18 PM

## 2024-08-02 NOTE — PATIENT INSTRUCTIONS
Dear Bernabe Mcallister,        Thank you for coming to your appointment today. I hope we have addressed all of your needs.       Please make sure to do the following:  - Continue your medications as listed.  - Get labs drawn before our next follow up. We will call you with the results   - We will see each other again in 3 months.      Have a great day!        Sincerely,  Khris Diaz MD  8/2/2024  3:36 PM

## 2024-08-22 ENCOUNTER — HOSPITAL ENCOUNTER (EMERGENCY)
Age: 59
Discharge: HOME | End: 2024-08-22
Attending: EMERGENCY MEDICINE
Payer: MEDICAID

## 2024-08-22 ENCOUNTER — APPOINTMENT (OUTPATIENT)
Dept: CT IMAGING | Age: 59
End: 2024-08-22
Payer: MEDICAID

## 2024-08-22 VITALS
SYSTOLIC BLOOD PRESSURE: 124 MMHG | TEMPERATURE: 98 F | RESPIRATION RATE: 16 BRPM | DIASTOLIC BLOOD PRESSURE: 81 MMHG | HEART RATE: 82 BPM | OXYGEN SATURATION: 98 %

## 2024-08-22 DIAGNOSIS — K52.9 COLITIS: Primary | ICD-10-CM

## 2024-08-22 DIAGNOSIS — K62.89 PROCTITIS: ICD-10-CM

## 2024-08-22 LAB
ALBUMIN SERPL-MCNC: 4 G/DL (ref 3.5–5.2)
ALP SERPL-CCNC: 81 U/L (ref 40–129)
ALT SERPL-CCNC: 32 U/L (ref 0–40)
ANION GAP SERPL CALCULATED.3IONS-SCNC: 12 MMOL/L (ref 7–16)
AST SERPL-CCNC: 29 U/L (ref 0–39)
BASOPHILS # BLD: 0.04 K/UL (ref 0–0.2)
BASOPHILS NFR BLD: 1 % (ref 0–2)
BILIRUB SERPL-MCNC: 0.3 MG/DL (ref 0–1.2)
BILIRUB UR QL STRIP: NEGATIVE
BUN SERPL-MCNC: 13 MG/DL (ref 6–23)
CALCIUM SERPL-MCNC: 8.8 MG/DL (ref 8.6–10.2)
CHLORIDE SERPL-SCNC: 106 MMOL/L (ref 98–107)
CLARITY UR: CLEAR
CO2 SERPL-SCNC: 20 MMOL/L (ref 22–29)
COLOR UR: YELLOW
CREAT SERPL-MCNC: 0.9 MG/DL (ref 0.7–1.2)
EOSINOPHIL # BLD: 0.31 K/UL (ref 0.05–0.5)
EOSINOPHILS RELATIVE PERCENT: 4 % (ref 0–6)
ERYTHROCYTE [DISTWIDTH] IN BLOOD BY AUTOMATED COUNT: 14.1 % (ref 11.5–15)
GFR, ESTIMATED: >90 ML/MIN/1.73M2
GLUCOSE SERPL-MCNC: 86 MG/DL (ref 74–99)
GLUCOSE UR STRIP-MCNC: NEGATIVE MG/DL
HCT VFR BLD AUTO: 41.1 % (ref 37–54)
HGB BLD-MCNC: 13.3 G/DL (ref 12.5–16.5)
HGB UR QL STRIP.AUTO: NEGATIVE
IMM GRANULOCYTES # BLD AUTO: <0.03 K/UL (ref 0–0.58)
IMM GRANULOCYTES NFR BLD: 0 % (ref 0–5)
KETONES UR STRIP-MCNC: NEGATIVE MG/DL
LACTATE BLDV-SCNC: 0.6 MMOL/L (ref 0.5–2.2)
LEUKOCYTE ESTERASE UR QL STRIP: NEGATIVE
LYMPHOCYTES NFR BLD: 1.95 K/UL (ref 1.5–4)
LYMPHOCYTES RELATIVE PERCENT: 26 % (ref 20–42)
MCH RBC QN AUTO: 28.7 PG (ref 26–35)
MCHC RBC AUTO-ENTMCNC: 32.4 G/DL (ref 32–34.5)
MCV RBC AUTO: 88.8 FL (ref 80–99.9)
MONOCYTES NFR BLD: 0.59 K/UL (ref 0.1–0.95)
MONOCYTES NFR BLD: 8 % (ref 2–12)
NEUTROPHILS NFR BLD: 61 % (ref 43–80)
NEUTS SEG NFR BLD: 4.58 K/UL (ref 1.8–7.3)
NITRITE UR QL STRIP: NEGATIVE
PH UR STRIP: 5.5 [PH] (ref 5–9)
PLATELET # BLD AUTO: 258 K/UL (ref 130–450)
PMV BLD AUTO: 10.7 FL (ref 7–12)
POTASSIUM SERPL-SCNC: 3.9 MMOL/L (ref 3.5–5)
PROT SERPL-MCNC: 7.1 G/DL (ref 6.4–8.3)
PROT UR STRIP-MCNC: NEGATIVE MG/DL
RBC # BLD AUTO: 4.63 M/UL (ref 3.8–5.8)
RBC #/AREA URNS HPF: ABNORMAL /HPF
SODIUM SERPL-SCNC: 138 MMOL/L (ref 132–146)
SP GR UR STRIP: >1.03 (ref 1–1.03)
UROBILINOGEN UR STRIP-ACNC: 0.2 EU/DL (ref 0–1)
WBC #/AREA URNS HPF: ABNORMAL /HPF
WBC OTHER # BLD: 7.5 K/UL (ref 4.5–11.5)

## 2024-08-22 PROCEDURE — 6370000000 HC RX 637 (ALT 250 FOR IP): Performed by: EMERGENCY MEDICINE

## 2024-08-22 PROCEDURE — 2580000003 HC RX 258: Performed by: EMERGENCY MEDICINE

## 2024-08-22 PROCEDURE — 80053 COMPREHEN METABOLIC PANEL: CPT

## 2024-08-22 PROCEDURE — 81001 URINALYSIS AUTO W/SCOPE: CPT

## 2024-08-22 PROCEDURE — 99285 EMERGENCY DEPT VISIT HI MDM: CPT

## 2024-08-22 PROCEDURE — 83605 ASSAY OF LACTIC ACID: CPT

## 2024-08-22 PROCEDURE — 85025 COMPLETE CBC W/AUTO DIFF WBC: CPT

## 2024-08-22 PROCEDURE — 6360000004 HC RX CONTRAST MEDICATION: Performed by: RADIOLOGY

## 2024-08-22 PROCEDURE — 74177 CT ABD & PELVIS W/CONTRAST: CPT

## 2024-08-22 RX ORDER — SODIUM CHLORIDE 0.9 % (FLUSH) 0.9 %
10 SYRINGE (ML) INJECTION
Status: DISCONTINUED | OUTPATIENT
Start: 2024-08-22 | End: 2024-08-22 | Stop reason: HOSPADM

## 2024-08-22 RX ORDER — CEFDINIR 300 MG/1
300 CAPSULE ORAL 2 TIMES DAILY
Qty: 20 CAPSULE | Refills: 0 | Status: SHIPPED | OUTPATIENT
Start: 2024-08-22 | End: 2024-09-01

## 2024-08-22 RX ORDER — 0.9 % SODIUM CHLORIDE 0.9 %
1000 INTRAVENOUS SOLUTION INTRAVENOUS ONCE
Status: COMPLETED | OUTPATIENT
Start: 2024-08-22 | End: 2024-08-22

## 2024-08-22 RX ORDER — CEFDINIR 300 MG/1
300 CAPSULE ORAL ONCE
Status: COMPLETED | OUTPATIENT
Start: 2024-08-22 | End: 2024-08-22

## 2024-08-22 RX ORDER — METRONIDAZOLE 500 MG/1
500 TABLET ORAL ONCE
Status: COMPLETED | OUTPATIENT
Start: 2024-08-22 | End: 2024-08-22

## 2024-08-22 RX ORDER — METRONIDAZOLE 500 MG/1
500 TABLET ORAL 2 TIMES DAILY
Qty: 20 TABLET | Refills: 0 | Status: SHIPPED | OUTPATIENT
Start: 2024-08-22 | End: 2024-09-01

## 2024-08-22 RX ADMIN — CEFDINIR 300 MG: 300 CAPSULE ORAL at 18:57

## 2024-08-22 RX ADMIN — IOPAMIDOL 75 ML: 755 INJECTION, SOLUTION INTRAVENOUS at 17:31

## 2024-08-22 RX ADMIN — METRONIDAZOLE 500 MG: 500 TABLET ORAL at 18:57

## 2024-08-22 RX ADMIN — SODIUM CHLORIDE 1000 ML: 9 INJECTION, SOLUTION INTRAVENOUS at 16:14

## 2024-08-22 ASSESSMENT — LIFESTYLE VARIABLES
HOW OFTEN DO YOU HAVE A DRINK CONTAINING ALCOHOL: NEVER
HOW MANY STANDARD DRINKS CONTAINING ALCOHOL DO YOU HAVE ON A TYPICAL DAY: PATIENT DOES NOT DRINK

## 2024-08-22 NOTE — ED PROVIDER NOTES
[facility] EMERGENCY DEPARTMENT  EMERGENCY DEPARTMENT ENCOUNTER        Pt Name: Bernabe Mcallister  MRN: [id number]  Birthdate 1965  Date of evaluation: 8/22/2024  Provider: Jenna Pat DO  PCP: Burak Centeno MD  Note Started: 4:14 PM EDT 8/22/24    CHIEF COMPLAINT       Chief Complaint   Patient presents with    Abdominal Pain     Lower Abd pain started last night, feels sharp. No pain at this time    Rectal Bleeding     Pt has bright red blood clots in stool starting this morning, hx polyps        HISTORY OF PRESENT ILLNESS: 1 or more Elements   History From: Patient    Limitations to history : None    Bernabe Mcallister is a 59 y.o. male who presents with concern for an episode of bright red bleeding.  He notes that he has had a colonoscopy with multiple polyps taken out in the past, last night he had some left lower quadrant abdominal pain and diarrhea, today he said that he was experiencing bright red blood per rectum that occurred twice.  He notes he also does have a history of hemorrhoids.  He says he is constantly going in between diarrhea and constipation.  No nausea or vomiting, chest pain difficulty breathing, no fevers or chills, no urinary complaints, no other associations      EXTERNAL NOTE REVIEW:      On chart review was last admitted to the hospital for basal cell carcinoma of his nose on 5/14/2024.  Last saw PCP for chronic shoulder pain on 8/2/2020    REVIEW OF SYSTEMS :      Positives and Pertinent negatives as per HPI.     SURGICAL HISTORY     Past Surgical History:   Procedure Laterality Date    ABDOMEN SURGERY      1 DAYS OLD, 4yo, 10yo    ABDOMINAL ADHESION SURGERY      APPENDECTOMY  1970    CHOLECYSTECTOMY, LAPAROSCOPIC N/A 01/25/2019    LAPAROSCOPIC [place]STECTOMY [place]res [place] [facility] [place]    COLONOSCOPY N/A 09/24/2021    COLONOSCOPY WITH BIOPSY performed [place] Yasmin Rdz MD [place] [facility] [place]

## 2024-08-28 NOTE — CONSULTS
Department of Orthopedic Surgery  Resident Consult Note          Reason for Consult:  Left hand pain     HISTORY OF PRESENT ILLNESS:       Patient is a 59 y.o. male who presents with left hand pain after cat scratch 1 week ago. Patient did not seek medical intervention thinking it was a just a sore finger however it has progressed to pain and swelling. Pain is localized to right hand on the volar aspect, nonradiating. Pain 6/10. Patient is right hand dominant. Anticoagulation - none. The patient occupation - . Pt lives at home. Patient denies significant past medical history. Previous Orthopedic Surgeon - no  Denies numbness/tingling/paresthesias. Denies any other orthopedic complaints at this time. Tobacco use: none  Alcohol use: none  Illicit drug use: no history of illicit drug use    Past Medical History:    History reviewed. No pertinent past medical history.   Past Surgical History:        Procedure Laterality Date    ABDOMEN SURGERY      1DAYS OLD    ABDOMINAL ADHESION SURGERY      APPENDECTOMY  1970    CHOLECYSTECTOMY, LAPAROSCOPIC N/A 1/25/2019    LAPAROSCOPIC CHOLECYSTECTOMY performed by Kristy Moran MD at [address] [place] [address] N/A 9/24/2021    COLONOSCOPY WITH BIOPSY performed by Pamela Pride MD at [facility] ENDOSCOPY     Current Medications:   Current Facility-Administered Medications: sodium chloride flush 0.9 % injection 10 mL, 10 mL, IntraVENous, 2 times per day  sodium chloride flush 0.9 % injection 10 mL, 10 mL, IntraVENous, PRN  0.9 % sodium chloride infusion, , IntraVENous, PRN  enoxaparin (LOVENOX) injection 40 mg, 40 mg, SubCUTAneous, Daily  ondansetron (ZOFRAN-ODT) disintegrating tablet 4 mg, 4 mg, Oral, Q8H PRN **OR** ondansetron (ZOFRAN) injection 4 mg, 4 mg, IntraVENous, Q6H PRN  polyethylene glycol (GLYCOLAX) packet 17 g, 17 g, Oral, Daily PRN  acetaminophen (TYLENOL) tablet 650 mg, 650 mg, Oral, Q6H PRN **OR** acetaminophen (TYLENOL) suppository 650 mg, 650 mg, Rectal, Q6H Doxycycline sent to pharmacy.   PRN  cefTRIAXone (ROCEPHIN) 2,000 mg in sterile water 20 mL IV syringe, 2,000 mg, IntraVENous, Q24H  metronidazole (FLAGYL) 500 mg in 0.9% NaCl 100 mL IVPB premix, 500 mg, IntraVENous, Q8H  Allergies:  Asa [aspirin], Pcn [penicillins], Rye grass flower pollen extract [gramineae pollens], and Albuterol sulfate    Social History:   TOBACCO:   reports that he has never smoked. He has never used smokeless tobacco.  ETOH:   reports current alcohol use. DRUGS:   reports no history of drug use.   ACTIVITIES OF DAILY LIVING:    OCCUPATION:    Family History:       Problem Relation Age of Onset    Breast Cancer Mother     Cancer Father     Kidney Disease Father        REVIEW OF SYSTEMS:  CONSTITUTIONAL:  negative for  fevers, chills  EYES:  negative for blurred vision, visual disturbance  HEENT:  negative for  hearing loss, voice change  RESPIRATORY:  negative for  dyspnea, wheezing  CARDIOVASCULAR:  negative for  chest pain, palpitations  GASTROINTESTINAL:  negative for nausea, vomiting  GENITOURINARY:  negative for frequency, urinary incontinence  HEMATOLOGIC/LYMPHATIC:  negative for bleeding and petechiae  MUSCULOSKELETAL:  positive for  pain and joint swelling  NEUROLOGICAL:  negative for headaches, dizziness  BEHAVIOR/PSYCH:  negative for increased agitation and anxiety    PHYSICAL EXAM:    VITALS:  /78   Pulse 78   Temp 98 °F (36.7 °C) (Oral)   Resp 18   Ht 6' 2\" (1.88 m)   Wt 209 lb (94.8 kg)   SpO2 96%   BMI 26.83 kg/m²   CONSTITUTIONAL:  awake, alert, cooperative, no apparent distress, and appears stated age  General appearance: alert, well appearing, and in no distress,  normal appearing weight  Mental status: alert, oriented to person, place, and time, normal mood, behavior, speech, dress, motor activity, and thought processes  Abdomen: soft, nondistended   Resp:   resp easy and unlabored, no audible wheezes note  Cardiac: distal pulses palpable, skin well perfused  Neurological: alert, oriented X3, normal speech, no focal findings or movement disorder noted, motor and sensory grossly normal bilaterally, normal muscle tone, no tremors, strength 5/5, normal gait and station  HEENT: normochephalic atraumatic, external ears and eyes normal, sclera normal, neck supple  Extremities:   peripheral pulses normal, no edema, redness or tenderness in the calves   Skin: normal coloration, no rashes or open wounds, no suspicious skin lesions noted  Psych: Affect euthymic   MUSCULOSKELETAL:  Left upper Extremity:  Scratch wound that are well healed at the radial aspect of middle finger. Mild erythema noted on index and middle finger. Mild to moderate swelling present without fluctuance. No proximal erythema. Warmth to palpation. Skin intact circumferentially  +TTP middle finger and palm. -TTP at forearm Compartments soft and compressible  Able to perform composite fist without pain. -ve pain to passive stretch. +AIN/PIN/Ulnar nerve function intact grossly  +Radial pulse, Brisk Cap refill, hand warm and perfused  Sensation intact to touch in radial/ulnar/median nerve distributions to hand    Secondary Exam:   rightUE: No obvious signs of trauma. -TTP to fingers, hand, wrist, forearm, elbow, humerus, shoulder or clavicle. -- Patient able to flex/extend fingers, wrist, elbow and shoulder with active and passive ROM without pain, +2/4 Radial pulse, cap refill <3sec, +AIN/PIN/Radial/Ulnar/Median N, distal sensation grossly intact to C4-T1 dermatomes, compartments soft and compressible. bilateralLE: No obvious signs of trauma. -TTP to foot, ankle, leg, knee, thigh, hip.-- Patient able to flex/extend toes, ankle, knee and hip with active and passive ROM without pain,+2/4 DP & PT pulses, cap refill <3sec, +5/5 PF/DF/EHL, distal sensation grossly intact to L4-S1 dermatomes, compartments soft and compressible.     Pelvis: -TTP, -Log roll, -Heel strike     DATA:    CBC:   Lab Results   Component Value Date/Time    WBC 9.8 11/05/2022 04:44 AM    RBC 4.62 11/05/2022 04:44 AM    HGB 13.6 11/05/2022 04:44 AM    HCT 41.7 11/05/2022 04:44 AM    MCV 90.3 11/05/2022 04:44 AM    MCH 29.4 11/05/2022 04:44 AM    MCHC 32.6 11/05/2022 04:44 AM    RDW 13.2 11/05/2022 04:44 AM     11/05/2022 04:44 AM    MPV 11.4 11/05/2022 04:44 AM     PT/INR:  No results found for: PROTIME, INR  CRP/ESR:   Lab Results   Component Value Date/Time    CRP 4.8 11/04/2022 10:34 AM    SEDRATE 47 11/04/2022 10:34 AM     Lactic Acid :   Lab Results   Component Value Date/Time    LACTA 0.9 09/21/2021 09:25 PM       Radiology Review:  11/05/22 - XR pending    IMPRESSION:   Left hand cellulitis    PLAN:  NWB - MARGI  Caleb pillow ordered and placed on left upper extremity. Ice applied. Low suspicion for flexor tenosynovitis. Recommend medical admission for overnight IV antibiotics. Hand XR pending  Pain Control admitting service  PT/OT   Continue ice and elevation to decrease swelling  No acute orthopedic intervention is required.  Will monitor patient during the course of his hospital stay for clinical progression while on IV ABX  Discussed with Dr. Keyla Covarrubias

## 2024-12-02 ENCOUNTER — HOSPITAL ENCOUNTER (OUTPATIENT)
Age: 59
Discharge: HOME | End: 2024-12-02
Payer: MEDICAID

## 2024-12-02 DIAGNOSIS — E78.00 HYPERCHOLESTEREMIA: ICD-10-CM

## 2024-12-02 LAB
CHOLEST SERPL-MCNC: 252 MG/DL
HDLC SERPL-MCNC: 37 MG/DL
LDLC SERPL CALC-MCNC: 189 MG/DL
TRIGL SERPL-MCNC: 132 MG/DL
VLDLC SERPL CALC-MCNC: 26 MG/DL

## 2024-12-02 PROCEDURE — 80061 LIPID PANEL: CPT

## 2024-12-02 PROCEDURE — 36415 COLL VENOUS BLD VENIPUNCTURE: CPT

## 2024-12-09 ENCOUNTER — HOSPITAL ENCOUNTER (OUTPATIENT)
Dept: GENERAL RADIOLOGY | Age: 59
Discharge: HOME | End: 2024-12-11
Payer: MEDICAID

## 2024-12-09 ENCOUNTER — HOSPITAL ENCOUNTER (OUTPATIENT)
Age: 59
Discharge: HOME | End: 2024-12-09
Payer: MEDICAID

## 2024-12-09 ENCOUNTER — HOSPITAL ENCOUNTER (OUTPATIENT)
Age: 59
Discharge: HOME | End: 2024-12-11
Payer: MEDICAID

## 2024-12-09 ENCOUNTER — OFFICE VISIT (OUTPATIENT)
Dept: INTERNAL MEDICINE | Age: 59
End: 2024-12-09
Payer: MEDICAID

## 2024-12-09 VITALS
TEMPERATURE: 97.2 F | HEART RATE: 71 BPM | SYSTOLIC BLOOD PRESSURE: 118 MMHG | BODY MASS INDEX: 24.82 KG/M2 | WEIGHT: 193.4 LBS | DIASTOLIC BLOOD PRESSURE: 71 MMHG | OXYGEN SATURATION: 96 % | RESPIRATION RATE: 18 BRPM | HEIGHT: 74 IN

## 2024-12-09 DIAGNOSIS — M25.532 LEFT WRIST PAIN: ICD-10-CM

## 2024-12-09 DIAGNOSIS — Z13.1 DIABETES MELLITUS SCREENING: Primary | ICD-10-CM

## 2024-12-09 DIAGNOSIS — N40.0 ENLARGED PROSTATE: ICD-10-CM

## 2024-12-09 DIAGNOSIS — M25.511 CHRONIC RIGHT SHOULDER PAIN: ICD-10-CM

## 2024-12-09 DIAGNOSIS — G89.29 CHRONIC RIGHT SHOULDER PAIN: ICD-10-CM

## 2024-12-09 DIAGNOSIS — R10.11 RIGHT UPPER QUADRANT ABDOMINAL PAIN: ICD-10-CM

## 2024-12-09 DIAGNOSIS — E78.00 HYPERCHOLESTEREMIA: ICD-10-CM

## 2024-12-09 LAB
HBA1C MFR BLD: 5.4 %
PSA SERPL-MCNC: 0.81 NG/ML (ref 0–4)

## 2024-12-09 PROCEDURE — 83036 HEMOGLOBIN GLYCOSYLATED A1C: CPT

## 2024-12-09 PROCEDURE — 73030 X-RAY EXAM OF SHOULDER: CPT

## 2024-12-09 PROCEDURE — G0103 PSA SCREENING: HCPCS

## 2024-12-09 PROCEDURE — 73110 X-RAY EXAM OF WRIST: CPT

## 2024-12-09 PROCEDURE — 99212 OFFICE O/P EST SF 10 MIN: CPT

## 2024-12-09 PROCEDURE — 36415 COLL VENOUS BLD VENIPUNCTURE: CPT

## 2024-12-09 PROCEDURE — 90656 IIV3 VACC NO PRSV 0.5 ML IM: CPT

## 2024-12-09 RX ORDER — FAMOTIDINE 20 MG/1
20 TABLET, FILM COATED ORAL DAILY PRN
Qty: 60 TABLET | Refills: 3 | Status: SHIPPED | OUTPATIENT
Start: 2024-12-09

## 2024-12-09 RX ORDER — TAMSULOSIN HYDROCHLORIDE 0.4 MG/1
0.4 CAPSULE ORAL DAILY
Qty: 30 CAPSULE | Refills: 2 | Status: SHIPPED | OUTPATIENT
Start: 2024-12-09

## 2024-12-09 RX ORDER — ATORVASTATIN CALCIUM 10 MG/1
10 TABLET, FILM COATED ORAL DAILY
Qty: 90 TABLET | Refills: 1 | Status: SHIPPED | OUTPATIENT
Start: 2024-12-09

## 2024-12-09 RX ORDER — METHYLPREDNISOLONE 4 MG/1
TABLET ORAL
Qty: 1 KIT | Refills: 0 | Status: SHIPPED | OUTPATIENT
Start: 2024-12-09 | End: 2024-12-15

## 2024-12-09 ASSESSMENT — ENCOUNTER SYMPTOMS
SHORTNESS OF BREATH: 0
COUGH: 0
DIARRHEA: 0
ABDOMINAL PAIN: 0
VOMITING: 0
NAUSEA: 0
BACK PAIN: 0
CONSTIPATION: 0
BLOOD IN STOOL: 0
RHINORRHEA: 0
CHOKING: 0
WHEEZING: 0
SORE THROAT: 0
ABDOMINAL DISTENTION: 0
CHEST TIGHTNESS: 0

## 2024-12-09 NOTE — PATIENT INSTRUCTIONS
Continue your medications as listed   Start taking Flomax  Please get labs done before next visit.  Please get imaging done before next visit.   Call for a sooner appointment if you have additional concerns.        Burak Centeno MD  Internal Medicine

## 2024-12-09 NOTE — PROGRESS NOTES
Bernabe Mcallister (:  1965) is a 59 y.o. male,Established patient, here for evaluation of the following chief complaint(s):  Follow-up, Results, Wrist Pain (Pt complains of left wrist pain for about two weeks. Pt states pain is constant and can get up to a 9/10.), and Shoulder Pain (Pt states shoulder pain persists. )         Assessment & Plan  Right upper quadrant abdominal pain       Orders:    famotidine (PEPCID) 20 MG tablet; Take 1 tablet by mouth daily as needed (As needed for abdominal discomfort)    methylPREDNISolone (MEDROL DOSEPACK) 4 MG tablet; Take by mouth.    Hypercholesteremia       Orders:    atorvastatin (LIPITOR) 10 MG tablet; Take 1 tablet by mouth daily    Chronic right shoulder pain       Orders:    XR SHOULDER RIGHT (MIN 2 VIEWS); Future    Left wrist pain       Orders:    methylPREDNISolone (MEDROL DOSEPACK) 4 MG tablet; Take by mouth.    XR WRIST LEFT (MIN 3 VIEWS); Future    Enlarged prostate       Orders:    PSA Screening; Future    tamsulosin (FLOMAX) 0.4 MG capsule; Take 1 capsule by mouth daily      Return in about 3 months (around 3/9/2025) for pcp follow up.       Subjective   HPI    The patient is here for follow-up of chronic medical problems    Colitis   Recent presentation to the emergency department for abdominal pain, found to have colitis, prescribed antibiotics   Completed antibiotic  Symptoms resolved since then    Enlarged prostate   Enlarged prostate noted on CT abdomen imaging  Patient also reported nocturia; denied any dribbling, intermittent urine, or hematuria  Will prescribe Flomax  Will obtain PSA levels,       Hyperlipidemia  The 10-year ASCVD risk score (Valerie DK, et al., 2019) is: 12.2%  Continue statin    Chronic right shoulder pain   Minimal improvement with Medrol pack   No tenderness noted during physical examination, no abnormal range of motion and movements  Will prescribe Medrol pack again  Will obtain right-sided shoulder x-ray, patient may

## 2024-12-09 NOTE — PROGRESS NOTES
[facility]  Internal Medicine Residency Clinic    Attending Physician Statement  I have discussed the case, including pertinent history and exam findings with the resident physician.  I agree with the assessment, plan and orders as documented by the resident. I have reviewed all pertinent PMHx, PSHx, FamHx, SocialHx, medications, and allergies and updated history as appropriate.    Patient here for routine follow up of medical problems.     Shoulder Pain   -xray; likely 2/2 overuse; topical and oral pain control  -PT   -if no improvement after 6 weeks of PT then plan to have MRI     HLD  -continue statin     HCM  -flu vaccine     Remainder of medical problems as per resident note.    Campos Calzada Jr, DO  12/9/24

## 2025-01-08 ENCOUNTER — HOSPITAL ENCOUNTER (EMERGENCY)
Age: 60
Discharge: HOME | End: 2025-01-08
Payer: MEDICAID

## 2025-01-08 ENCOUNTER — APPOINTMENT (OUTPATIENT)
Dept: GENERAL RADIOLOGY | Age: 60
End: 2025-01-08
Payer: MEDICAID

## 2025-01-08 VITALS
RESPIRATION RATE: 18 BRPM | TEMPERATURE: 98.6 F | HEART RATE: 71 BPM | DIASTOLIC BLOOD PRESSURE: 74 MMHG | OXYGEN SATURATION: 96 % | SYSTOLIC BLOOD PRESSURE: 108 MMHG

## 2025-01-08 DIAGNOSIS — U07.1 COVID-19: Primary | ICD-10-CM

## 2025-01-08 LAB
FLUAV RNA RESP QL NAA+PROBE: NOT DETECTED
FLUBV RNA RESP QL NAA+PROBE: NOT DETECTED
SARS-COV-2 RNA RESP QL NAA+PROBE: DETECTED
SOURCE: ABNORMAL
SPECIMEN DESCRIPTION: ABNORMAL
SPECIMEN SOURCE: NORMAL
STREP A, MOLECULAR: NEGATIVE

## 2025-01-08 PROCEDURE — 2500000003 HC RX 250 WO HCPCS

## 2025-01-08 PROCEDURE — 96372 THER/PROPH/DIAG INJ SC/IM: CPT

## 2025-01-08 PROCEDURE — 6360000002 HC RX W HCPCS

## 2025-01-08 PROCEDURE — 99284 EMERGENCY DEPT VISIT MOD MDM: CPT

## 2025-01-08 PROCEDURE — 71046 X-RAY EXAM CHEST 2 VIEWS: CPT

## 2025-01-08 PROCEDURE — 87651 STREP A DNA AMP PROBE: CPT

## 2025-01-08 PROCEDURE — 87636 SARSCOV2 & INF A&B AMP PRB: CPT

## 2025-01-08 RX ORDER — PREDNISONE 10 MG/1
TABLET ORAL
Qty: 20 TABLET | Refills: 0 | Status: SHIPPED | OUTPATIENT
Start: 2025-01-08 | End: 2025-01-18

## 2025-01-08 RX ORDER — KETOROLAC TROMETHAMINE 30 MG/ML
30 INJECTION, SOLUTION INTRAMUSCULAR; INTRAVENOUS ONCE
Status: COMPLETED | OUTPATIENT
Start: 2025-01-08 | End: 2025-01-08

## 2025-01-08 RX ORDER — FLUTICASONE PROPIONATE 50 MCG
2 SPRAY, SUSPENSION (ML) NASAL DAILY
Qty: 16 G | Refills: 0 | Status: SHIPPED | OUTPATIENT
Start: 2025-01-08

## 2025-01-08 RX ORDER — DEXAMETHASONE SODIUM PHOSPHATE 10 MG/ML
10 INJECTION INTRAMUSCULAR; INTRAVENOUS ONCE
Status: COMPLETED | OUTPATIENT
Start: 2025-01-08 | End: 2025-01-08

## 2025-01-08 RX ORDER — GUAIFENESIN 200 MG/10ML
200 LIQUID ORAL ONCE
Status: COMPLETED | OUTPATIENT
Start: 2025-01-08 | End: 2025-01-08

## 2025-01-08 RX ADMIN — GUAIFENESIN 200 MG: 200 SOLUTION ORAL at 18:44

## 2025-01-08 RX ADMIN — KETOROLAC TROMETHAMINE 30 MG: 30 INJECTION, SOLUTION INTRAMUSCULAR at 18:44

## 2025-01-08 RX ADMIN — DEXAMETHASONE SODIUM PHOSPHATE 10 MG: 10 INJECTION INTRAMUSCULAR; INTRAVENOUS at 18:44

## 2025-01-08 ASSESSMENT — LIFESTYLE VARIABLES
HOW MANY STANDARD DRINKS CONTAINING ALCOHOL DO YOU HAVE ON A TYPICAL DAY: PATIENT DOES NOT DRINK
HOW OFTEN DO YOU HAVE A DRINK CONTAINING ALCOHOL: NEVER

## 2025-01-09 NOTE — DISCHARGE INSTRUCTIONS
You tested positive today for COVID.  In addition to medications that were sent to your pharmacy, alternate between Tylenol and ibuprofen every 4 hours.  Take 600 mg of ibuprofen and 500 to 1000 mg of Tylenol.  Please follow-up with your primary care doctor.  If you develop any new or worsening symptoms, please return to the emergency department for reevaluation.    XR CHEST (2 VW)   Final Result   No evidence of active cardiopulmonary pathology.

## 2025-01-09 NOTE — ED PROVIDER NOTES
Independent ROZ Visit.      [facility]  Department of Emergency Medicine   ED  Encounter Note  Admit Date/RoomTime: 2025  5:54 PM  ED Room: [place] [id number]    NAME: Bernabe Mcallister  : 1965  MRN: [id number]     Chief Complaint:  Pharyngitis (Sore throat, ear pain and sinus pressure for 2 days)    History of Present Illness       Bernabe Mcallister is a 60 y.o. old male who presents to the emergency department by private vehicle, for nasal congestion, rhinorrhea, sore throat, and cough, which began a few day(s) prior to arrival.  Since onset the symptoms have been remaining constant and moderate in severity. The symptoms are associated with no additional symptoms as it relates to today's visit.  There has been no abdominal pain, decreased appetite, chest tightness, conjunctivitis, watery eyes, nausea, vomiting, diarrhea, dizziness, dysuria, urinary frequency, fever, fatigue, irritability, joint swelling, malaise, neck stiffness, rash, sneezing, wheezing, loss of taste, or loss of smell. The patient has been fully vaccinated against COVID-19.  His wife is sick with COVID.  He is having same symptoms.  On assessment, he is in no acute distress, nontoxic-appearing, respirations are easy nonlabored.  GCS is 15.  Moving all extremities without issue.  Ambulating without issue    ROS   Pertinent positives and negatives are stated within HPI, all other systems reviewed and are negative.    Past Medical History:  has no past medical history on file.    Surgical History:  has a past surgical history that includes Abdomen surgery; Appendectomy (); Abdominal adhesion surgery; Cholecystectomy, laparoscopic (N/A, 2019); Colonoscopy (N/A, 2021); and Facial Surgery (N/A, 2024).    Social History:  reports that he has never smoked. He has never used smokeless tobacco. He reports current alcohol use. He reports that he does not use drugs.    Family History: family history includes

## 2025-02-07 ENCOUNTER — OFFICE VISIT (OUTPATIENT)
Dept: INTERNAL MEDICINE | Age: 60
End: 2025-02-07
Payer: MEDICAID

## 2025-02-07 VITALS
OXYGEN SATURATION: 96 % | DIASTOLIC BLOOD PRESSURE: 78 MMHG | HEART RATE: 69 BPM | BODY MASS INDEX: 24.9 KG/M2 | HEIGHT: 74 IN | WEIGHT: 194 LBS | RESPIRATION RATE: 18 BRPM | SYSTOLIC BLOOD PRESSURE: 130 MMHG | TEMPERATURE: 97 F

## 2025-02-07 DIAGNOSIS — G89.29 CHRONIC RIGHT SHOULDER PAIN: ICD-10-CM

## 2025-02-07 DIAGNOSIS — M25.511 CHRONIC RIGHT SHOULDER PAIN: ICD-10-CM

## 2025-02-07 DIAGNOSIS — Z13.21 ENCOUNTER FOR VITAMIN DEFICIENCY SCREENING: ICD-10-CM

## 2025-02-07 DIAGNOSIS — E78.00 HYPERCHOLESTEREMIA: Primary | ICD-10-CM

## 2025-02-07 DIAGNOSIS — N40.0 ENLARGED PROSTATE: ICD-10-CM

## 2025-02-07 PROCEDURE — 1036F TOBACCO NON-USER: CPT

## 2025-02-07 PROCEDURE — G8427 DOCREV CUR MEDS BY ELIG CLIN: HCPCS

## 2025-02-07 PROCEDURE — 3017F COLORECTAL CA SCREEN DOC REV: CPT

## 2025-02-07 PROCEDURE — 99212 OFFICE O/P EST SF 10 MIN: CPT

## 2025-02-07 PROCEDURE — G8420 CALC BMI NORM PARAMETERS: HCPCS

## 2025-02-07 PROCEDURE — 99213 OFFICE O/P EST LOW 20 MIN: CPT

## 2025-02-07 RX ORDER — TAMSULOSIN HYDROCHLORIDE 0.4 MG/1
0.4 CAPSULE ORAL DAILY
Qty: 30 CAPSULE | Refills: 2 | Status: SHIPPED | OUTPATIENT
Start: 2025-02-07

## 2025-02-07 RX ORDER — LIDOCAINE 50 MG/G
1 PATCH TOPICAL DAILY PRN
Qty: 10 PATCH | Refills: 0 | Status: SHIPPED | OUTPATIENT
Start: 2025-02-07 | End: 2025-02-17

## 2025-02-07 SDOH — ECONOMIC STABILITY: FOOD INSECURITY: WITHIN THE PAST 12 MONTHS, YOU WORRIED THAT YOUR FOOD WOULD RUN OUT BEFORE YOU GOT MONEY TO BUY MORE.: NEVER TRUE

## 2025-02-07 SDOH — ECONOMIC STABILITY: FOOD INSECURITY: WITHIN THE PAST 12 MONTHS, THE FOOD YOU BOUGHT JUST DIDN'T LAST AND YOU DIDN'T HAVE MONEY TO GET MORE.: NEVER TRUE

## 2025-02-07 ASSESSMENT — PATIENT HEALTH QUESTIONNAIRE - PHQ9
SUM OF ALL RESPONSES TO PHQ QUESTIONS 1-9: 0
SUM OF ALL RESPONSES TO PHQ9 QUESTIONS 1 & 2: 0
1. LITTLE INTEREST OR PLEASURE IN DOING THINGS: NOT AT ALL
SUM OF ALL RESPONSES TO PHQ QUESTIONS 1-9: 0
2. FEELING DOWN, DEPRESSED OR HOPELESS: NOT AT ALL
SUM OF ALL RESPONSES TO PHQ QUESTIONS 1-9: 0
SUM OF ALL RESPONSES TO PHQ QUESTIONS 1-9: 0

## 2025-02-07 NOTE — PROGRESS NOTES
[facility]  Internal Medicine Residency Clinic    Attending Physician Statement  I have discussed the case, including pertinent history and exam findings with the resident physician.  I agree with the assessment, plan and orders as documented by the resident. I have reviewed the relevant PMHx, PSHx, FamHx, SocialHx, medications, and allergies and updated history as appropriate.    Patient presents for routine follow up of medical problems.   No acute complaints   LUTs continued concerns but only with persistent nocturia     Stream is improved overall   Additional work-up including Diabetes assessment and infection were previously unremarkable   CT scan confirms enlarged prostate with unremarkable PSA values     Suspected BPH with persistent nocturia    Discussed options   BP stable- no orthostatic symptoms with Flomax use   Ok to increase to BID use and monitor for tolerance and orthostatic symptoms   Consider adding Finasteride trial   Agree with urology referral for urodynamics assessment- may benefit if bladder neck concerns for sling assessment     Right Shoulder Pain- ongoing    Mechanical in nature    Recommending Sports Medicine assessment at this time   PT management   If refractory to supportive care- MRI imaging would be warranted     Remainder of medical problems as per resident note.    Roel Johnson MD, FACP   2/7/2025 3:15 PM

## 2025-02-07 NOTE — PATIENT INSTRUCTIONS
Please get labs done before next visit.  Referrals have been made to: Urology and Sports medicine. If there office does not call in one week. Please call our office to follow up.  Call for a sooner appointment if you have additional concerns.        Burak Centeno MD  Internal Medicine

## 2025-02-07 NOTE — PROGRESS NOTES
Bernabe Mcallister (:  1965) is a 60 y.o. male,Established patient, here for evaluation of the following chief complaint(s):  Follow-up (Pt states there are no new updates at this time. )         Assessment & Plan  Enlarged prostate       Orders:    tamsulosin (FLOMAX) 0.4 MG capsule; Take 1 capsule by mouth daily    Cm Coello MD, Urology, [place]    Hypercholesteremia       Orders:    Comprehensive Metabolic Panel; Future    CBC with Auto Differential; Future    Encounter for vitamin deficiency screening       Orders:    Vitamin D 25 Hydroxy; Future    Chronic right shoulder pain       Orders:    Nathan Contreras DO, Sports Medicine, [place]    diclofenac sodium (VOLTAREN) 1 % GEL; Apply 2 g topically 4 times daily as needed for Pain (as needed for shoulder pain)    lidocaine (LIDODERM) 5 %; Place 1 patch onto the skin daily as needed for Pain 12 hours on, 12 hours off.      Return in about 4 months (around 2025) for pcp follow up.       Subjective   HPI    Patient here for follow up of chronic medical problems.     Hx Colitis, Symptoms resolved since then              Recent presentation to the emergency department for abdominal pain, found to have colitis, prescribed antibiotics              Completed antibiotic       Enlarged prostate              Enlarged prostate noted on CT abdomen imaging  Patient also reported nocturia; denied any dribbling, intermittent urine, or hematuria  Will continue  Flomax  Yearly PSA levels  Referral to Urology                    Hyperlipidemia  The 10-year ASCVD risk score (Valerie DK, et al., 2019) is: 12.2%  Continue statin     Chronic right shoulder pain              Minimal improvement with Medrol pack              No tenderness noted during physical examination, +ve empty can testg   Reviewed shoulder xray, noacute abnormality  Discussed plan with patient, will place referral to sports medicine for steroid injection   Continue lidocaine

## 2025-02-14 ENCOUNTER — OFFICE VISIT (OUTPATIENT)
Dept: ORTHOPEDIC SURGERY | Age: 60
End: 2025-02-14

## 2025-02-14 VITALS — HEIGHT: 74 IN | BODY MASS INDEX: 24.9 KG/M2 | WEIGHT: 194 LBS

## 2025-02-14 DIAGNOSIS — M25.811 IMPINGEMENT OF RIGHT SHOULDER: Primary | ICD-10-CM

## 2025-02-14 RX ORDER — LIDOCAINE HYDROCHLORIDE 10 MG/ML
2 INJECTION, SOLUTION INFILTRATION; PERINEURAL ONCE
Status: COMPLETED | OUTPATIENT
Start: 2025-02-14 | End: 2025-02-14

## 2025-02-14 RX ORDER — BETAMETHASONE SODIUM PHOSPHATE AND BETAMETHASONE ACETATE 3; 3 MG/ML; MG/ML
6 INJECTION, SUSPENSION INTRA-ARTICULAR; INTRALESIONAL; INTRAMUSCULAR; SOFT TISSUE ONCE
Status: COMPLETED | OUTPATIENT
Start: 2025-02-14 | End: 2025-02-14

## 2025-02-14 RX ADMIN — LIDOCAINE HYDROCHLORIDE 2 ML: 10 INJECTION, SOLUTION INFILTRATION; PERINEURAL at 08:25

## 2025-02-14 RX ADMIN — BETAMETHASONE SODIUM PHOSPHATE AND BETAMETHASONE ACETATE 6 MG: 3; 3 INJECTION, SUSPENSION INTRA-ARTICULAR; INTRALESIONAL; INTRAMUSCULAR; SOFT TISSUE at 08:25

## 2025-02-14 NOTE — PROGRESS NOTES
equipment, or special requirements.     PROCEDURE DETAILS     The procedure was carried out under sterile technique.      Patient Position: ?Supine.     Localization Process: ?The subacromial/subdeltoid bursa was evaluated under ultrasound prior to starting the procedure. ?The skin was prepped with Betadine and Alcohol.    Approach: ?In-plane.     Local Anesthesia: ?Local anesthesia was obtained with vapocoolant cold spray. ?     Injection/Aspiration: ?A 23-gauge, 1.5-inch needle was advanced from an in-plane approach into the subacromial/subdeltoid bursa. ?After visualization of the needle tip in the target area and negative aspiration for blood, a mixture of 3 cc of 1% lidocaine and 1 cc of betamethasone (6 mg/cc) was injected into the subacromial bursa with excellent sonographic flow. ?Images of procedure were permanently recorded.    Postprocedure Care: ?The patient will avoid heavy exertion with the shoulder and avoid soaking the shoulder under water for two days. ?The patient will contact me with any problems related to the injection.     PATIENT EDUCATION     Ready to learn, no apparent learning barriers were identified; learning preferences include listening. ?Explained diagnosis and treatment plan; patient expressed understanding of the content.     INFORMED CONSENT     Discussed the risks, benefits, alternatives, and the necessity of other members of the healthcare team participating in the procedure. ?All questions answered and consent given.     Following denial of allergy and review of potential side effects and complications including but not necessarily limited to infection, allergic reaction, local tissue breakdown, systemic effects of corticosteroids, elevation of blood glucose, injury to soft tissue and/or nerves, and seizure, the patient indicated their understanding and agreed to proceed. ?Discussed the risks, benefits, alternatives, and the necessity of other members of the healthcare team

## 2025-03-27 NOTE — PROGRESS NOTES
[facility]  PRIMARY CARE SPORTS MEDICINE  DATE OF VISIT : 3/28/2025    Patient : Bernabe Mcallister  Age : 60 y.o.   : 1965  MRN : [id number]   ______________________________________________________________________    Chief Complaint :   Chief Complaint   Patient presents with    Shoulder Pain     Right Shoulder F/U       HPI : Bernabe Mcallister is 60 y.o. ambidextrous hand dominant male who presented to the clinic for evaluation of right shoulder pain. 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    Today 2025, he presents for follow up of right shoulder pain.  Patient had some initial improvement after the injection for about 1 or 2 days but has had return of this pain.  The pain continues to bother him at work especially when lifting heavy objects and moving overhead.  He denies any new injury or trauma to the shoulder.    On 2025, he says the pain started 1 year(s) ago. The pain started after throwing a football at a kids camp. He localizes the pain to the superior and lateral. His pain is worse with reaching overhead. He has noticed popping in the shoulder and some loss of  strength. He has done 1 year of PT with no significant relief. Denies numbness and tingling.     ROS:  All pertinent positive symptoms are included in the history of present illness.      Past Medical History :  No past medical history on file.  Past Surgical History:   Procedure Laterality Date    ABDOMEN SURGERY      1 DAYS OLD, 6yo, 10yo    ABDOMINAL ADHESION SURGERY      APPENDECTOMY  1970    CHOLECYSTECTOMY, LAPAROSCOPIC N/A 2019    LAPAROSCOPIC CHOLECYST[place] [facility] [place]    [place]ONOSCOPY N/A 2021    COLONOSCOPY WITH BIOPSY [place] Yasmin Rdz [place] [facility] [place]    FACIAL SURGERY N/A 2024    excision of basal cell carcinoma of right nasal dorsum with rhomboid closure performed by Valentino Ervin MD at

## 2025-03-28 ENCOUNTER — OFFICE VISIT (OUTPATIENT)
Dept: ORTHOPEDIC SURGERY | Age: 60
End: 2025-03-28
Payer: MEDICAID

## 2025-03-28 VITALS — WEIGHT: 194 LBS | HEIGHT: 74 IN | BODY MASS INDEX: 24.9 KG/M2

## 2025-03-28 DIAGNOSIS — M75.111 NONTRAUMATIC INCOMPLETE TEAR OF RIGHT ROTATOR CUFF: Primary | ICD-10-CM

## 2025-03-28 PROCEDURE — 3017F COLORECTAL CA SCREEN DOC REV: CPT | Performed by: FAMILY MEDICINE

## 2025-03-28 PROCEDURE — G8427 DOCREV CUR MEDS BY ELIG CLIN: HCPCS | Performed by: FAMILY MEDICINE

## 2025-03-28 PROCEDURE — 1036F TOBACCO NON-USER: CPT | Performed by: FAMILY MEDICINE

## 2025-03-28 PROCEDURE — G8420 CALC BMI NORM PARAMETERS: HCPCS | Performed by: FAMILY MEDICINE

## 2025-03-28 PROCEDURE — 99213 OFFICE O/P EST LOW 20 MIN: CPT | Performed by: FAMILY MEDICINE

## 2025-05-09 ENCOUNTER — RESULTS FOLLOW-UP (OUTPATIENT)
Dept: ORTHOPEDIC SURGERY | Age: 60
End: 2025-05-09

## 2025-05-12 ENCOUNTER — OFFICE VISIT (OUTPATIENT)
Dept: ORTHOPEDIC SURGERY | Age: 60
End: 2025-05-12
Payer: MEDICAID

## 2025-05-12 VITALS — TEMPERATURE: 98.6 F | HEIGHT: 74 IN | BODY MASS INDEX: 24.9 KG/M2 | WEIGHT: 194 LBS

## 2025-05-12 DIAGNOSIS — M67.813 TENDINOSIS OF RIGHT ROTATOR CUFF: ICD-10-CM

## 2025-05-12 DIAGNOSIS — M24.111 LABRAL TEAR OF SHOULDER, DEGENERATIVE, RIGHT: Primary | ICD-10-CM

## 2025-05-12 PROCEDURE — G8420 CALC BMI NORM PARAMETERS: HCPCS | Performed by: FAMILY MEDICINE

## 2025-05-12 PROCEDURE — 20611 DRAIN/INJ JOINT/BURSA W/US: CPT | Performed by: FAMILY MEDICINE

## 2025-05-12 PROCEDURE — 99213 OFFICE O/P EST LOW 20 MIN: CPT | Performed by: FAMILY MEDICINE

## 2025-05-12 PROCEDURE — G8427 DOCREV CUR MEDS BY ELIG CLIN: HCPCS | Performed by: FAMILY MEDICINE

## 2025-05-12 PROCEDURE — 1036F TOBACCO NON-USER: CPT | Performed by: FAMILY MEDICINE

## 2025-05-12 PROCEDURE — 3017F COLORECTAL CA SCREEN DOC REV: CPT | Performed by: FAMILY MEDICINE

## 2025-05-12 RX ORDER — LIDOCAINE HYDROCHLORIDE 10 MG/ML
2 INJECTION, SOLUTION INFILTRATION; PERINEURAL ONCE
Status: COMPLETED | OUTPATIENT
Start: 2025-05-12 | End: 2025-05-12

## 2025-05-12 RX ORDER — TRIAMCINOLONE ACETONIDE 40 MG/ML
80 INJECTION, SUSPENSION INTRA-ARTICULAR; INTRAMUSCULAR ONCE
Status: COMPLETED | OUTPATIENT
Start: 2025-05-12 | End: 2025-05-12

## 2025-05-12 RX ADMIN — TRIAMCINOLONE ACETONIDE 80 MG: 40 INJECTION, SUSPENSION INTRA-ARTICULAR; INTRAMUSCULAR at 08:24

## 2025-05-12 RX ADMIN — LIDOCAINE HYDROCHLORIDE 2 ML: 10 INJECTION, SOLUTION INFILTRATION; PERINEURAL at 08:23

## 2025-05-12 NOTE — PROGRESS NOTES
[facility]  PRIMARY CARE SPORTS MEDICINE  DATE OF VISIT : 2025    Patient : Bernabe Mcallister  Age : 60 y.o.   : 1965  MRN : [id number]   ______________________________________________________________________    Chief Complaint :   Chief Complaint   Patient presents with    Follow-up     Right shoulder f/u. Patient states he is doing okay overall. States pain and ROM has improved some. Pain today 2/10. Here to discuss MRI results.        HPI : Bernabe Mcallister is 60 y.o. ambidextrous hand dominant male who presented to the clinic for evaluation of right shoulder pain. 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    Today 2025, the patient presents for follow up of right shoulder pain.  Patient is here for his MRI results.  Overall he states he is doing okay.  He has been able to work with some minimal pain.  He has improved range of motion and improvement in his pain overall.  He rates the pain as a 2/10 today in the office.    On 2025, he presents for follow up of right shoulder pain.  Patient had some initial improvement after the injection for about 1 or 2 days but has had return of this pain.  The pain continues to bother him at work especially when lifting heavy objects and moving overhead.  He denies any new injury or trauma to the shoulder.    On 2025, he says the pain started 1 year(s) ago. The pain started after throwing a football at a kids camp. He localizes the pain to the superior and lateral. His pain is worse with reaching overhead. He has noticed popping in the shoulder and some loss of  strength. He has done 1 year of PT with no significant relief. Denies numbness and tingling.     ROS:  All pertinent positive symptoms are included in the history of present illness.      Past Medical History :  No past medical history on file.  Past Surgical History:   Procedure Laterality Date    ABDOMEN SURGERY      3 DAYS OLD, 4yo,

## 2025-06-06 ENCOUNTER — OFFICE VISIT (OUTPATIENT)
Age: 60
End: 2025-06-06
Payer: MEDICAID

## 2025-06-06 VITALS
DIASTOLIC BLOOD PRESSURE: 74 MMHG | HEIGHT: 74 IN | SYSTOLIC BLOOD PRESSURE: 112 MMHG | BODY MASS INDEX: 25.8 KG/M2 | RESPIRATION RATE: 16 BRPM | OXYGEN SATURATION: 96 % | WEIGHT: 201 LBS | HEART RATE: 92 BPM | TEMPERATURE: 97.8 F

## 2025-06-06 DIAGNOSIS — R10.11 RIGHT UPPER QUADRANT ABDOMINAL PAIN: ICD-10-CM

## 2025-06-06 DIAGNOSIS — N40.0 ENLARGED PROSTATE: ICD-10-CM

## 2025-06-06 DIAGNOSIS — L98.9 SKIN LESION: Primary | ICD-10-CM

## 2025-06-06 DIAGNOSIS — M25.511 CHRONIC RIGHT SHOULDER PAIN: ICD-10-CM

## 2025-06-06 DIAGNOSIS — G89.29 CHRONIC RIGHT SHOULDER PAIN: ICD-10-CM

## 2025-06-06 DIAGNOSIS — E78.00 HYPERCHOLESTEREMIA: ICD-10-CM

## 2025-06-06 PROCEDURE — 3017F COLORECTAL CA SCREEN DOC REV: CPT

## 2025-06-06 PROCEDURE — G8427 DOCREV CUR MEDS BY ELIG CLIN: HCPCS

## 2025-06-06 PROCEDURE — 99213 OFFICE O/P EST LOW 20 MIN: CPT

## 2025-06-06 PROCEDURE — 1036F TOBACCO NON-USER: CPT

## 2025-06-06 PROCEDURE — 99212 OFFICE O/P EST SF 10 MIN: CPT

## 2025-06-06 PROCEDURE — G8419 CALC BMI OUT NRM PARAM NOF/U: HCPCS

## 2025-06-06 RX ORDER — TAMSULOSIN HYDROCHLORIDE 0.4 MG/1
0.4 CAPSULE ORAL DAILY
Qty: 90 CAPSULE | Refills: 1 | Status: SHIPPED | OUTPATIENT
Start: 2025-06-06

## 2025-06-06 RX ORDER — FAMOTIDINE 20 MG/1
20 TABLET, FILM COATED ORAL DAILY PRN
Qty: 180 TABLET | Refills: 1 | Status: SHIPPED | OUTPATIENT
Start: 2025-06-06

## 2025-06-06 RX ORDER — ATORVASTATIN CALCIUM 10 MG/1
10 TABLET, FILM COATED ORAL DAILY
Qty: 90 TABLET | Refills: 1 | Status: SHIPPED | OUTPATIENT
Start: 2025-06-06

## 2025-06-06 NOTE — PROGRESS NOTES
Bernabe Mcallister (:  1965) is a 60 y.o. male,Established patient, here for evaluation of the following chief complaint(s):  Diabetes         Assessment & Plan  Hypercholesteremia       Orders:    atorvastatin (LIPITOR) 10 MG tablet; Take 1 tablet by mouth daily    Chronic right shoulder pain       Orders:    diclofenac sodium (VOLTAREN) 1 % GEL; Apply 2 g topically 4 times daily as needed for Pain (as needed for shoulder pain)    Right upper quadrant abdominal pain       Orders:    famotidine (PEPCID) 20 MG tablet; Take 1 tablet by mouth daily as needed (As needed for abdominal discomfort)    Enlarged prostate       Orders:    tamsulosin (FLOMAX) 0.4 MG capsule; Take 1 capsule by mouth daily    Skin lesion       Orders:    External Referral To Dermatology      Return in about 3 months (around 2025) for pcp follow up.       Subjective   HPI    Patient is here for follow-up of chronic medical problems  Lab work from prior visit was not done.  It was reprinted again for the patient to obtain for his next visit.    Chronic right shoulder pain  Patient was referred to orthopedics, MRI was obtained showing mild tendinosis of the supraspinatus and spinatus with mild attenuation.  Also showed degeneration and tearing of the superior and anterior superior labrum.  He was evaluated by orthopedic surgery on .  Received ultrasound-guided lidocaine and Kenalog injection into the right glenohumeral joint.  Patient reports relief in pain however again noted this starting to wear off 1 month)    Skin lesion  Noted in the back, he is unsure how long it has been there for.  Noted some bleeding when he scratches it.  Referral to dermatology placed     Enlarged prostate  Symptoms stable  Continue Flomax                  GERD   Continue Pepcid as needed, symptoms stable    Chronic diarrhea  Patient had multiple abdominal surgeries as a child  Patient using over-the-counter Imodium    Review of Systems

## 2025-06-06 NOTE — PROGRESS NOTES
[facility]  Internal Medicine Residency Clinic  Attending Physician Statement:  Cristino Campos M.D., F.A.C.P.  Patient is seen for fu visit today.  -- acute and chronic problems addressed  Addressed when applicable- Health maintenance issues of vaccinations, social determinants/depression/CA screening, tobacco cessation etc...  I have discussed the case, including pertinent history and exam findings with the resident, review of last visit medical records/labs-   I agree with the assessment, plan and orders as documented by the resident.    -Billiing assessed by medical complexity of case  My assessment of high points of todays visit as follows:    Fu on shoulder chronic pain  Labral tears noted +tendinopathy= fu ortho  Injection recently done  Partial response   +/- voltaren gel prn  (Failed long term benefit PT)- fur ortho    Pepcid for GERD  Flomax, bPH stable     Inc lipids on statin

## 2025-06-06 NOTE — PATIENT INSTRUCTIONS
Dear Bernabe Mcallister,    Thank you for coming to your appointment at [facility].    Please make sure to do the following:    - Continue medications as listed and contact our office if medications are unavailable at the pharmacy.    - If lab work was ordered please complete as instructed.    - If a referral was placed to another doctor's office, please contact our office in 5 business days if you have not heard from that office regarding the referral.    - If any imaging test was ordered at today's visit (ultrasound, CT scan, or MRI), expect a phone call to schedule in the next 5 business days. You may also call [facility] Imaging Scheduling department at [phone] to schedule.    Contact our office if you develop any new or worsening symptoms or if you have any questions regarding today's visit.    We aim to address your healthcare needs to your satisfaction!    Sincerely,  Burak Centeno MD  6/6/2025  3:15 PM

## 2025-06-09 ENCOUNTER — HOSPITAL ENCOUNTER (OUTPATIENT)
Age: 60
Discharge: HOME | End: 2025-06-09
Payer: MEDICAID

## 2025-06-09 DIAGNOSIS — Z13.21 ENCOUNTER FOR VITAMIN DEFICIENCY SCREENING: ICD-10-CM

## 2025-06-09 DIAGNOSIS — E78.00 HYPERCHOLESTEREMIA: ICD-10-CM

## 2025-06-09 LAB
25(OH)D3 SERPL-MCNC: 32.7 NG/ML (ref 30–100)
ALBUMIN SERPL-MCNC: 4.1 G/DL (ref 3.5–5.2)
ALP SERPL-CCNC: 87 U/L (ref 40–129)
ALT SERPL-CCNC: 34 U/L (ref 0–50)
ANION GAP SERPL CALCULATED.3IONS-SCNC: 12 MMOL/L (ref 7–16)
AST SERPL-CCNC: 36 U/L (ref 0–50)
BASOPHILS # BLD: 0.03 K/UL (ref 0–0.2)
BASOPHILS NFR BLD: 1 % (ref 0–2)
BILIRUB SERPL-MCNC: 0.3 MG/DL (ref 0–1.2)
BUN SERPL-MCNC: 14 MG/DL (ref 8–23)
CALCIUM SERPL-MCNC: 8.9 MG/DL (ref 8.8–10.2)
CHLORIDE SERPL-SCNC: 108 MMOL/L (ref 98–107)
CO2 SERPL-SCNC: 21 MMOL/L (ref 22–29)
CREAT SERPL-MCNC: 0.9 MG/DL (ref 0.7–1.2)
EOSINOPHIL # BLD: 0.26 K/UL (ref 0.05–0.5)
EOSINOPHILS RELATIVE PERCENT: 5 % (ref 0–6)
ERYTHROCYTE [DISTWIDTH] IN BLOOD BY AUTOMATED COUNT: 13.2 % (ref 11.5–15)
GFR, ESTIMATED: >90 ML/MIN/1.73M2
GLUCOSE SERPL-MCNC: 91 MG/DL (ref 74–99)
HCT VFR BLD AUTO: 40.3 % (ref 37–54)
HGB BLD-MCNC: 13.3 G/DL (ref 12.5–16.5)
IMM GRANULOCYTES # BLD AUTO: <0.03 K/UL (ref 0–0.58)
IMM GRANULOCYTES NFR BLD: 0 % (ref 0–5)
LYMPHOCYTES NFR BLD: 1.39 K/UL (ref 1.5–4)
LYMPHOCYTES RELATIVE PERCENT: 26 % (ref 20–42)
MCH RBC QN AUTO: 29.4 PG (ref 26–35)
MCHC RBC AUTO-ENTMCNC: 33 G/DL (ref 32–34.5)
MCV RBC AUTO: 89 FL (ref 80–99.9)
MONOCYTES NFR BLD: 0.45 K/UL (ref 0.1–0.95)
MONOCYTES NFR BLD: 8 % (ref 2–12)
NEUTROPHILS NFR BLD: 60 % (ref 43–80)
NEUTS SEG NFR BLD: 3.27 K/UL (ref 1.8–7.3)
PLATELET # BLD AUTO: 239 K/UL (ref 130–450)
PMV BLD AUTO: 11 FL (ref 7–12)
POTASSIUM SERPL-SCNC: 4.5 MMOL/L (ref 3.5–5.1)
PROT SERPL-MCNC: 6.8 G/DL (ref 6.4–8.3)
RBC # BLD AUTO: 4.53 M/UL (ref 3.8–5.8)
SODIUM SERPL-SCNC: 142 MMOL/L (ref 136–145)
WBC OTHER # BLD: 5.4 K/UL (ref 4.5–11.5)

## 2025-06-09 PROCEDURE — 85025 COMPLETE CBC W/AUTO DIFF WBC: CPT

## 2025-06-09 PROCEDURE — 82306 VITAMIN D 25 HYDROXY: CPT

## 2025-06-09 PROCEDURE — 80053 COMPREHEN METABOLIC PANEL: CPT

## 2025-06-09 PROCEDURE — 36415 COLL VENOUS BLD VENIPUNCTURE: CPT

## (undated) DEVICE — STERILE POLYISOPRENE POWDER-FREE SURGICAL GLOVES: Brand: PROTEXIS

## (undated) DEVICE — MICRODISSECTION NEEDLE STRAIGHT SLEEVE: Brand: COLORADO

## (undated) DEVICE — TIBURON GENERAL ENDOSCOPY DRAPE: Brand: CONVERTORS

## (undated) DEVICE — DEFENDO AIR WATER SUCTION AND BIOPSY VALVE KIT FOR  OLYMPUS: Brand: DEFENDO AIR/WATER/SUCTION AND BIOPSY VALVE

## (undated) DEVICE — SYRINGE MED 20ML STD CLR PLAS LUERLOCK TIP N CTRL DISP

## (undated) DEVICE — FORCEPS BX L240CM JAW DIA2.4MM WRK CHN 2.8MM ORNG L CAP W/

## (undated) DEVICE — TROCAR ENDOSCP L100MM DIA5MM BLDELSS STBL SL OBT RADLUC

## (undated) DEVICE — Z INACTIVE USE 2735373 APPLICATOR FBR LAIN COT WOOD TIP ECONOMICAL

## (undated) DEVICE — GOWN,SIRUS,FABRNF,XL,20/CS: Brand: MEDLINE

## (undated) DEVICE — NEEDLE CLOSURE OMNICLOSE

## (undated) DEVICE — KIT,ANTI FOG,W/SPONGE & FLUID,SOFT PACK: Brand: MEDLINE

## (undated) DEVICE — 1.5L THIN WALL CAN: Brand: CRD

## (undated) DEVICE — CHLORAPREP 26ML ORANGE

## (undated) DEVICE — TROCAR ENDOSCP SHFT L100MM DIA5MM DIL TIP ENDOPATH XCEL

## (undated) DEVICE — HEAD AND NECK: Brand: MEDLINE INDUSTRIES, INC.

## (undated) DEVICE — NEEDLE HYPO 27GA L15IN REG BVL W O SFTY FOR SYR DISPOSABLE

## (undated) DEVICE — TROCAR ENDOSCP L100MM DIA12MM DIL TIP STBL SL ENDOPATH XCEL

## (undated) DEVICE — TRAY,SKIN SCRUB,DRY,W/GAUZE: Brand: MEDLINE

## (undated) DEVICE — SET INSTRUMENT LAP I

## (undated) DEVICE — SET ENDO INSTR LAPAROSCOPIC STGENLAP

## (undated) DEVICE — APPLIER CLP M L L11.4IN DIA10MM ENDOSCP ROT MULT FOR LIG

## (undated) DEVICE — APPLIER CLP M/L SHFT DIA5MM 15 LIG LIGAMAX 5

## (undated) DEVICE — CAMERA STRYKER 1488 HD GEN

## (undated) DEVICE — PAD,NON-ADHERENT,2X3,STERILE,LF,1/PK: Brand: MEDLINE

## (undated) DEVICE — NEEDLE INSUF L120MM DIA2MM DISP FOR PNEUMOPERI ENDOPATH

## (undated) DEVICE — CONNECTOR IRRIGATION AUXILIARY H2O JET W/ PRT MTL THRD HYDR

## (undated) DEVICE — INTENDED FOR TISSUE SEPARATION, AND OTHER PROCEDURES THAT REQUIRE A SHARP SURGICAL BLADE TO PUNCTURE OR CUT.: Brand: BARD-PARKER ® STAINLESS STEEL BLADES

## (undated) DEVICE — Z DISCONTINUED NO SUB IDED TUBING ETCO2 AD L6.5FT NSL ORAL CVD PRNG NONFLARED TIP OVR

## (undated) DEVICE — CONTAINER SPEC 60ML PH 7NEUTRAL BUFF FRMLN RDY TO USE

## (undated) DEVICE — ELECTRODE PT RET AD L9FT HI MOIST COND ADH HYDRGEL CORDED

## (undated) DEVICE — TOWEL,OR,DSP,ST,BLUE,STD,6/PK,12PK/CS: Brand: MEDLINE

## (undated) DEVICE — PUMP SUC IRR TBNG L10FT W/ HNDPC ASSEMB STRYKEFLOW 2

## (undated) DEVICE — GLOVE ORANGE PI 7 1/2   MSG9075

## (undated) DEVICE — SEALER TISS L35CM ADV BPLR STR TIP LAP APPRCH ENSEAL G2

## (undated) DEVICE — MARKER SURG SKIN FULL SZ PUR TRAD INK REGULAR ULTRA FN TWIN

## (undated) DEVICE — GAUZE,SPONGE,POST-OP,4X3,STRL,LF: Brand: MEDLINE

## (undated) DEVICE — INSUFFLATION TUBING SET WITH FILTER, FUNNEL CONNECTOR AND LUER LOCK: Brand: JOSNOE MEDICAL INC

## (undated) DEVICE — TROCAR ENDOSCP L100MM DIA12MM UNIV SL OBT RADLUC STBL SL

## (undated) DEVICE — BLADE CLIPPER GEN PURP NS

## (undated) DEVICE — Device

## (undated) DEVICE — Z INACTIVE USE 2660664 SOLUTION IRRIG 3000ML 0.9% SOD CHL USP UROMATIC PLAS CONT

## (undated) DEVICE — GARMENT,MEDLINE,DVT,INT,CALF,MED, GEN2: Brand: MEDLINE

## (undated) DEVICE — LAPAROSCOPIC SCISSORS: Brand: EPIX LAPAROSCOPIC SCISSORS

## (undated) DEVICE — SKIN AFFIX SURG ADHESIVE 72/CS 0.55ML: Brand: MEDLINE

## (undated) DEVICE — SURGICAL PROCEDURE PACK BASIC

## (undated) DEVICE — TOTAL TRAY, DB, 100% SILI FOLEY, 16FR 10: Brand: MEDLINE

## (undated) DEVICE — PATIENT RETURN ELECTRODE, SINGLE-USE, CONTACT QUALITY MONITORING, ADULT, WITH 9FT CORD, FOR PATIENTS WEIGING OVER 33LBS. (15KG): Brand: MEGADYNE

## (undated) DEVICE — Z DISCONTINUED NO SUB IDED BAG SPEC RETRV M C240ML MOUTH 7.3MM L17CM SHFT 10MM NYL EZEE